# Patient Record
Sex: MALE | Race: ASIAN | NOT HISPANIC OR LATINO | Employment: FULL TIME | URBAN - METROPOLITAN AREA
[De-identification: names, ages, dates, MRNs, and addresses within clinical notes are randomized per-mention and may not be internally consistent; named-entity substitution may affect disease eponyms.]

---

## 2017-02-14 ENCOUNTER — OFFICE VISIT (OUTPATIENT)
Dept: FAMILY MEDICINE | Facility: CLINIC | Age: 22
End: 2017-02-14
Payer: COMMERCIAL

## 2017-02-14 ENCOUNTER — TELEPHONE (OUTPATIENT)
Dept: FAMILY MEDICINE | Facility: CLINIC | Age: 22
End: 2017-02-14

## 2017-02-14 VITALS
DIASTOLIC BLOOD PRESSURE: 82 MMHG | SYSTOLIC BLOOD PRESSURE: 124 MMHG | TEMPERATURE: 97.3 F | WEIGHT: 154 LBS | HEART RATE: 117 BPM | OXYGEN SATURATION: 99 % | BODY MASS INDEX: 24.75 KG/M2 | HEIGHT: 66 IN

## 2017-02-14 DIAGNOSIS — F41.9 ANXIETY: ICD-10-CM

## 2017-02-14 DIAGNOSIS — F90.9 ATTENTION DEFICIT HYPERACTIVITY DISORDER (ADHD), UNSPECIFIED ADHD TYPE: Primary | ICD-10-CM

## 2017-02-14 PROCEDURE — 99203 OFFICE O/P NEW LOW 30 MIN: CPT | Performed by: FAMILY MEDICINE

## 2017-02-14 RX ORDER — METHYLPHENIDATE HYDROCHLORIDE 30 MG/1
30 CAPSULE, EXTENDED RELEASE ORAL EVERY MORNING
Qty: 30 CAPSULE | Refills: 0 | Status: SHIPPED | OUTPATIENT
Start: 2017-02-14 | End: 2017-02-15

## 2017-02-14 ASSESSMENT — ANXIETY QUESTIONNAIRES
2. NOT BEING ABLE TO STOP OR CONTROL WORRYING: SEVERAL DAYS
7. FEELING AFRAID AS IF SOMETHING AWFUL MIGHT HAPPEN: NOT AT ALL
4. TROUBLE RELAXING: SEVERAL DAYS
1. FEELING NERVOUS, ANXIOUS, OR ON EDGE: SEVERAL DAYS
6. BECOMING EASILY ANNOYED OR IRRITABLE: SEVERAL DAYS
IF YOU CHECKED OFF ANY PROBLEMS ON THIS QUESTIONNAIRE, HOW DIFFICULT HAVE THESE PROBLEMS MADE IT FOR YOU TO DO YOUR WORK, TAKE CARE OF THINGS AT HOME, OR GET ALONG WITH OTHER PEOPLE: NOT DIFFICULT AT ALL
3. WORRYING TOO MUCH ABOUT DIFFERENT THINGS: SEVERAL DAYS
GAD7 TOTAL SCORE: 5
5. BEING SO RESTLESS THAT IT IS HARD TO SIT STILL: NOT AT ALL

## 2017-02-14 NOTE — PROGRESS NOTES
SUBJECTIVE:                                                    Denver Brewer is a 21 year old male who presents to clinic today for the following health issues:      Medication Followup of Metadate 20 mg     Taking Medication as prescribed: yes    Side Effects:  Loss of appetite and having a hard time falling asleep.     Medication Helping Symptoms:  Yes    Patient has history of ADHD. Records available of neuropsych testing in Epic. Records reviewed  Patient has previously been getting medication from [facility] [employer] at the [facility]. He graduated recently. 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 Would like to be able to continue his methylphenidate. Currently on 20 mg extended release methylphenidate daily. He has 2 more doses left. He has been skipping days and using it only if absolutely needed. He tells that he benefits a lot when he uses the medication and is able to finish his tasks and stay focused. He tells a medication effect last for 5 hours only. If he takes it at 8 AM, he does not have much medication effect in the afternoon.  Wonders if he could increase the dose of the medication. Reports of no insomnia.  In the past he tells that during exams he would occasionally use 2 tablets to help him be able to study well and then his appetite gets suppressed.      Complains of feeling a little anxious and overwhelmed. Would like to see a therapist for that.    Problem list and histories reviewed & adjusted, as indicated.  Additional history: as documented    Patient Active Problem List   Diagnosis     Attention deficit hyperactivity disorder (ADHD), unspecified ADHD type     History reviewed. No pertinent past surgical history.    Social History   Substance Use Topics     Smoking status: Never Smoker     Smokeless tobacco: Not on file     Alcohol use Yes      Comment: rare     Family History   Problem Relation Age of Onset     Family History Negative Other          Current Outpatient Prescriptions  "  Medication Sig Dispense Refill     Methylphenidate HCl ER, XR, 15 MG CP24 Take 2 capsules by mouth every morning 60 capsule 0     Allergies   Allergen Reactions     Cats        ROS:  C: NEGATIVE for fever, chills, change in weight  I: NEGATIVE for worrisome rashes, moles or lesions  E: NEGATIVE for vision changes or irritation  E/M: NEGATIVE for ear, mouth and throat problems  R: NEGATIVE for significant cough or SOB  B: NEGATIVE for masses, tenderness or discharge  CV: NEGATIVE for chest pain, palpitations or peripheral edema  GI: NEGATIVE for nausea, abdominal pain, heartburn, or change in bowel habits  : NEGATIVE for frequency, dysuria, or hematuria  M: NEGATIVE for significant arthralgias or myalgia  N: NEGATIVE for weakness, dizziness or paresthesias  E: NEGATIVE for temperature intolerance, skin/hair changes  H: NEGATIVE for bleeding problems      OBJECTIVE:                                                    /82 (BP Location: Right arm, Patient Position: Chair, Cuff Size: Adult Regular)  Pulse 117  Temp 97.3  F (36.3  C) (Tympanic)  Ht 5' 5.5\" (1.664 m)  Wt 154 lb (69.9 kg)  SpO2 99%  BMI 25.24 kg/m2  Body mass index is 25.24 kg/(m^2).  GENERAL: healthy, alert and no distress  EYES: Eyes grossly normal to inspection, extraocular movements - intact, and PERRL  HENT: ear canals- normal; TMs- normal; Nose- normal; Mouth- no ulcers, no lesions  NECK: no tenderness, no adenopathy, no asymmetry, no masses, no stiffness; thyroid- normal to palpation  RESP: lungs clear to auscultation - no rales, no rhonchi, no wheezes  CV: regular rates and rhythm, normal S1 S2, no S3 or S4 and no murmur, no click or rub -  ABDOMEN: soft, no tenderness, no  hepatosplenomegaly, no masses, normal bowel sounds  MS: extremities- no gross deformities noted, no edema  SKIN: no suspicious lesions, no rashes  NEURO: strength and tone- normal, sensory exam- grossly normal, mentation- intact, speech- normal, reflexes- " symmetric  BACK: no CVA tenderness, no paralumbar tenderness  PSYCH: Alert and oriented times 3; speech- coherent , normal rate and volume; able to articulate logical thoughts, able to abstract reason, no tangential thoughts, no hallucinations or delusions, affect- normal  LYMPHATICS: ant. cervical- normal, post. cervical- normal, axillary- normal, supraclavicular- normal, inguinal- normal         ASSESSMENT/PLAN:                                                        ICD-10-CM    1. Attention deficit hyperactivity disorder (ADHD), unspecified ADHD type F90.9 DISCONTINUED: methylphenidate (METADATE CD) 30 MG CR capsule   2. Anxiety F41.9 MENTAL HEALTH REFERRAL     Recommending to increase the dose of methylphenidate ER from 20-30 mg daily.  Follow-up in one month for recheck if no improvement noted. Hopefully the higher dose will give a longer coverage of effects from the medication  Instructed to use it only in the morning and avoid using extra tablets without discussing it with me.  Referring to mental health for counseling for mood disorder  Follow up with Provider - as needed     Jacob Ambrose MD  [facility]

## 2017-02-14 NOTE — MR AVS SNAPSHOT
After Visit Summary   2/14/2017    Denver Brewer    MRN: [id number]           Patient Information     Date Of Birth          1995        Visit Information        Provider Department      2/14/2017 5:20 PM Jacob Ambrose MD [facility] [place]        Today's Diagnoses     Attention deficit hyperactivity disorder (ADHD), unspecified ADHD type    -  1    Anxiety           Follow-ups after your visit        Additional Services     MENTAL HEALTH REFERRAL       Your provider has referred you to: [facility]: [facility] - Counseling (Individual/Couples/Family) - [facility] [place] [phone]   [email or web address]/   *Patient will be contacted by [facility] scheduling partner, [facility]), to schedule an appointment.  Patients may also call [facility] to schedule.    All scheduling is subject to the client's specific insurance plan & benefits, provider/location availability, and provider clinical specialities.  Please arrive 15 minutes early for your first appointment and bring your completed paperwork.    Please be aware that coverage of these services is subject to the terms and limitations of your health insurance plan.  Call member services at your health plan with any benefit or coverage questions.                  Who to contact     If you have questions or need follow up information about today's clinic visit or your schedule please contact [facility] [place] directly at [phone].  Normal or non-critical lab and imaging results will be communicated to you by MyChart, letter or phone within 4 business days after the clinic has received the results. If you do not hear from us within 7 days, please contact the clinic through MyChart or phone. If you have a critical or abnormal lab result, we will notify you by phone as soon as possible.  Submit refill requests through The car easily beat or  "call your pharmacy and they will forward the refill request to us. Please allow 3 business days for your refill to be completed.          Additional Information About Your Visit        MyChart Information     [employer] lets you send messages to your doctor, view your test results, renew your prescriptions, schedule appointments and more. To sign up, go to [email or web address] . Click on \"Log in\" on the left side of the screen, which will take you to the Welcome page. Then click on \"Sign up Now\" on the right side of the page.     You will be asked to enter the access code listed below, as well as some personal information. Please follow the directions to create your username and password.     Your access code is: [email or web address]  Expires: 5/15/2017  5:45 PM     Your access code will  in 90 days. If you need help or a new code, please call your [facility] clinic or [phone].        Care EveryWhere ID     This is your Care EveryWhere ID. This could be used by other organizations to access your [facility] medical records  [id number]        Your Vitals Were     Pulse Temperature Height Pulse Oximetry BMI (Body Mass Index)       117 97.3  F (36.3  C) (Tympanic) 5' 5.5\" (1.664 m) 99% 25.24 kg/m2        Blood Pressure from Last 3 Encounters:   17 124/82   12/24/15 112/70    Weight from Last 3 Encounters:   17 154 lb (69.9 kg)   12/24/15 140 lb (63.5 kg)              We Performed the Following     MENTAL HEALTH REFERRAL          Today's Medication Changes          These changes are accurate as of: 17  5:45 PM.  If you have any questions, ask your nurse or doctor.               Start taking these medicines.        Dose/Directions    methylphenidate 30 MG CR capsule   Commonly known as:  METADATE CD   Used for:  Attention deficit hyperactivity disorder (ADHD), unspecified ADHD type   Replaces:  METHYLPHENIDATE HCL PO   Started by:  Jacob Ambrose MD        Dose:  30 mg   Take 1 capsule (30 mg) by mouth " every morning   Quantity:  30 capsule   Refills:  0         Stop taking these medicines if you haven't already. Please contact your care team if you have questions.     METHYLPHENIDATE HCL PO   Replaced by:  methylphenidate 30 MG CR capsule   Stopped by:  Jacob Ambrose MD                Where to get your medicines      Some of these will need a paper prescription and others can be bought over the counter.  Ask your nurse if you have questions.     Bring a paper prescription for each of these medications     methylphenidate 30 MG CR capsule                Primary Care Provider    Physician No Ref-Primary       No address on file        Thank you!     Thank you for choosing [facility]  for your care. Our goal is always to provide you with excellent care. Hearing back from our patients is one way we can continue to improve our services. Please take a few minutes to complete the written survey that you may receive in the mail after your visit with us. Thank you!             Your Updated Medication List - Protect others around you: Learn how to safely use, store and throw away your medicines at www.disposemymeds.org.          This list is accurate as of: 2/14/17  5:45 PM.  Always use your most recent med list.                   Brand Name Dispense Instructions for use    methylphenidate 30 MG CR capsule    METADATE CD    30 capsule    Take 1 capsule (30 mg) by mouth every morning

## 2017-02-14 NOTE — NURSING NOTE
"Chief Complaint   Patient presents with     Recheck Medication     refills ADD medications      Establish Care       Initial /82 (BP Location: Right arm, Patient Position: Chair, Cuff Size: Adult Regular)  Pulse 117  Temp 97.3  F (36.3  C) (Tympanic)  Ht 5' 5.5\" (1.664 m)  Wt 154 lb (69.9 kg)  SpO2 99%  BMI 25.24 kg/m2 Estimated body mass index is 25.24 kg/(m^2) as calculated from the following:    Height as of this encounter: 5' 5.5\" (1.664 m).    Weight as of this encounter: 154 lb (69.9 kg).  BP completed using cuff size: regular    Health Maintenance Due   Topic Date Due     HPV IMMUNIZATION (1 of 3 - Male 3 Dose Series) 07/25/2005     TETANUS IMMUNIZATION (SYSTEM ASSIGNED)  07/25/2012     INFLUENZA VACCINE (SYSTEM ASSIGNED)  09/01/2016         Cristina Golden MA 2/14/17        "

## 2017-02-15 DIAGNOSIS — F90.9 ATTENTION DEFICIT HYPERACTIVITY DISORDER (ADHD), UNSPECIFIED ADHD TYPE: ICD-10-CM

## 2017-02-15 RX ORDER — METHYLPHENIDATE HYDROCHLORIDE 15 MG/1
2 CAPSULE, EXTENDED RELEASE ORAL EVERY MORNING
Qty: 60 CAPSULE | Refills: 0 | Status: SHIPPED | OUTPATIENT
Start: 2017-02-15 | End: 2017-02-16

## 2017-02-15 ASSESSMENT — ANXIETY QUESTIONNAIRES: GAD7 TOTAL SCORE: 5

## 2017-02-15 NOTE — TELEPHONE ENCOUNTER
I will order 15 mg Metadate instead and have the patient take 2 caps QD.   Lets hope that is covered.   Notify patient and the pharmacy.     Thanks.    Jacob Ambrose MD  [facility], [place]

## 2017-02-15 NOTE — TELEPHONE ENCOUNTER
Spoke with patient and informed of below. Patient verbalized understanding and agrees with plan.   Heather Neil RN   [facility] - Triage

## 2017-02-15 NOTE — TELEPHONE ENCOUNTER
Medication is not covered by insurance 30 mg Metadate   Pharmacy  Told the patient that the  20 mg XR is covered-  Patient wants this changed tonight-  Advised the patient that we are closed and he wll have to wait until tomorrow.  patient agreeable  Mimi MadisonRN  [facility]  [phone]

## 2017-02-15 NOTE — TELEPHONE ENCOUNTER
Patient called and said he was just at the clinic pharmacy to pickup up his Methylphenidate HCl ER, XR, 15 MG CP24  States he needs it changed to Methylphenidate 20 mg in tablet form  Please call patient at [phone] for questions  Ronda JAEGER

## 2017-02-16 RX ORDER — METHYLPHENIDATE HYDROCHLORIDE 20 MG/1
1 CAPSULE, EXTENDED RELEASE ORAL EVERY MORNING
Qty: 30 CAPSULE | Refills: 0 | Status: SHIPPED | OUTPATIENT
Start: 2017-02-16 | End: 2017-02-16

## 2017-02-16 RX ORDER — METHYLPHENIDATE HYDROCHLORIDE 20 MG/1
1 CAPSULE, EXTENDED RELEASE ORAL EVERY MORNING
Qty: 30 CAPSULE | Refills: 0 | Status: SHIPPED | OUTPATIENT
Start: 2017-02-16 | End: 2017-03-24

## 2017-02-16 NOTE — TELEPHONE ENCOUNTER
Please see the notes below, pt requesting the tablet not capsule due to insurance reasons. Rx for the Methylphenidate XR capsule placed on your desk.  Nina Rodriguez,

## 2017-02-16 NOTE — TELEPHONE ENCOUNTER
Patient states that both [facility] and [facility] do not carry the 15 mg either capsule or tablet.    Patient request to go back the methylphenidate ER 20 mg tablets.    Requests to  rx at the [facility]. Please walk rx to [facility].    Please call patient when prescription is ready for .  Sherie Cancino RN

## 2017-02-16 NOTE — TELEPHONE ENCOUNTER
TC took script to clinic pharmacy per message below  Pharmacist said that script was incorrect and that's not what the patient wanted  Also stated that our clinic pharmacy doesn't carry the Methylphenidate HCI ER, XR, 20 MG CP24  Pharmacist also said patient said he wanted the 12hr sustained release and that patient doesn't use our pharmacy and was going to [facility]  TC placed script back in Provider bin to discuss with pharmacist  Patient has not yet been notified  Ronda JAEGER

## 2017-02-17 RX ORDER — METHYLPHENIDATE HYDROCHLORIDE EXTENDED RELEASE 20 MG/1
20 TABLET ORAL EVERY MORNING
Qty: 30 TABLET | Refills: 0 | Status: SHIPPED | OUTPATIENT
Start: 2017-02-17 | End: 2017-03-24

## 2017-02-17 NOTE — TELEPHONE ENCOUNTER
"Patient states that he cannot afford the 30 mg tablets at this time and needs to stick with the 20 mg ER 24 hour tablet.     Last rx written was for capsules, needs tablets    Patient would like phone call when prescription is placed at  for , [phone], answers to \"Frank\" (hoping to be ready for 1 PM pickup)    Pended rx, please edit    RICO Forrester    "

## 2017-02-17 NOTE — TELEPHONE ENCOUNTER
Per Dr. Ambrose note, the patient should increase his dose to methylphenidate 30 mg ER daily from 20 mg daily.  I can rewrite this script, if patient does not want this new dosing, we should wait for Dr. Ambrose to return to the office to address it and determine appropriate dosing

## 2017-02-17 NOTE — TELEPHONE ENCOUNTER
Rx delivered to  [place] pharmacy . Previous rx for capsules destroyed and witnessed by Mimi Madison RN.  Nina Rodriguez,

## 2017-03-14 PROBLEM — F41.9 ANXIETY: Status: ACTIVE | Noted: 2017-03-14

## 2017-03-23 DIAGNOSIS — F90.9 ATTENTION DEFICIT HYPERACTIVITY DISORDER (ADHD), UNSPECIFIED ADHD TYPE: ICD-10-CM

## 2017-03-23 RX ORDER — METHYLPHENIDATE HYDROCHLORIDE EXTENDED RELEASE 20 MG/1
20 TABLET ORAL EVERY MORNING
Qty: 30 TABLET | Refills: 0 | Status: CANCELLED | OUTPATIENT
Start: 2017-03-23

## 2017-03-23 NOTE — TELEPHONE ENCOUNTER
metadate      Last Written Prescription Date:  2/17/17  Last Fill Quantity: 30,   # refills: 0  Last Office Visit with [facility], [facility] or [facility] prescribing provider: 2/14/17  Future Office visit:       Routing refill request to provider for review/approval because:  Drug not on the [facility], [facility] or [facility] refill protocol or controlled substance    RX [place] reviewed:  reviewed- no concerns    [place]:  [email or web address]    Heather Neil RN   [facility] - Triage

## 2017-03-23 NOTE — TELEPHONE ENCOUNTER
Reason for Call:  Medication or medication refill:    Do you use a [facility]?  Name of the pharmacy and phone number for the current request:  Will  from the clinic    Name of the medication requested: methylphenidate (METADATE ER) 20 MG CR     Other request: pt stated he is out of medication and need tomorrow by 2 PM if possible.    Can we leave a detailed message on this number? YES    Phone number patient can be reached at: Home number on file [phone] (home)    Best Time: anytime    Call taken on 3/23/2017 at 6:28 PM by Julienne Verdugo

## 2017-03-24 ENCOUNTER — TELEPHONE (OUTPATIENT)
Dept: FAMILY MEDICINE | Facility: CLINIC | Age: 22
End: 2017-03-24

## 2017-03-24 RX ORDER — METHYLPHENIDATE HYDROCHLORIDE EXTENDED RELEASE 20 MG/1
20 TABLET ORAL EVERY MORNING
Qty: 30 TABLET | Refills: 0 | Status: SHIPPED | OUTPATIENT
Start: 2017-03-24 | End: 2017-05-17

## 2017-03-24 NOTE — TELEPHONE ENCOUNTER
Reason for call: Medication   If this is a refill request, has the caller requested the refill from the pharmacy already? N/A  Will the patient be using a [facility]? No  Name of the pharmacy and phone number for the current [facility] [place]    Name of the medication requested: Methylphenidate, 20 mg    Phone Number Pt can be reached at: Home number on file [phone] (home)  Best Time: anytime  Can we leave a detailed message on this number? YES

## 2017-05-17 DIAGNOSIS — F90.9 ATTENTION DEFICIT HYPERACTIVITY DISORDER (ADHD), UNSPECIFIED ADHD TYPE: ICD-10-CM

## 2017-05-17 NOTE — TELEPHONE ENCOUNTER
Patient will  hard copy when ready    Controlled Substance Refill Request for methylphenidate (METADATE ER) 20 MG CR tablet  Problem List Complete:  No     PROVIDER TO CONSIDER COMPLETION OF PROBLEM LIST AND OVERVIEW/CONTROLLED SUBSTANCE AGREEMENT    Last Written Prescription Date:  3/24/17  Last Fill Quantity: 30,   # refills: 0    Last Office Visit with [facility] primary care provider: 2/14/17    Future Office visit:     Controlled substance agreement on file: No.     Processing:  Patient will  in clinic   checked in past 6 months?  Yes 3/23/17     Ronda JAEGER

## 2017-05-18 RX ORDER — METHYLPHENIDATE HYDROCHLORIDE EXTENDED RELEASE 20 MG/1
20 TABLET ORAL EVERY MORNING
Qty: 30 TABLET | Refills: 0 | Status: SHIPPED | OUTPATIENT
Start: 2017-05-18 | End: 2017-07-07

## 2017-07-06 DIAGNOSIS — F90.9 ATTENTION DEFICIT HYPERACTIVITY DISORDER (ADHD), UNSPECIFIED ADHD TYPE: ICD-10-CM

## 2017-07-06 NOTE — TELEPHONE ENCOUNTER
Reason for Call:  Medication or medication refill:    Do you use a [facility]?  Name of the pharmacy and phone number for the current request:  Will  from the clinic.    Name of the medication requested: METADATE ER) 20 MG CR    Other request: no    Can we leave a detailed message on this number? YES    Phone number patient can be reached at: Home number on file [phone] (home)    Best Time: anytime    Call taken on 7/6/2017 at 5:13 PM by Julienne Verdugo

## 2017-07-07 ENCOUNTER — NURSE TRIAGE (OUTPATIENT)
Dept: NURSING | Facility: CLINIC | Age: 22
End: 2017-07-07

## 2017-07-07 NOTE — TELEPHONE ENCOUNTER
Controlled Substance Refill Request for methylphenidate (metadate er) 20 mg cr tablet  Problem List Complete:  Yes   checked in past 6 months?  No, route to RN

## 2017-07-07 NOTE — TELEPHONE ENCOUNTER
Regarding: calling to check on refill request  ----- Message from Cristina Valdes sent at 7/7/2017  6:35 PM CDT -----  Reason for Call:  Medication or medication refill:    Do you use a [facility]?  Name of the pharmacy and phone number for the current request:      Name of the medication requested: Metadate    Other request:     Can we leave a detailed message on this number? YES    Phone number patient can be reached at: Home number on file [phone] (home)    Best Time:     Call taken on 7/7/2017 at 6:34 PM by Cristina Valdes

## 2017-07-07 NOTE — TELEPHONE ENCOUNTER
Refill request in place for medication.  Patient will call the clinic on Monday to check on the status.     Danielle Romo RN/NAT

## 2017-07-10 RX ORDER — METHYLPHENIDATE HYDROCHLORIDE EXTENDED RELEASE 20 MG/1
20 TABLET ORAL EVERY MORNING
Qty: 30 TABLET | Refills: 0 | Status: SHIPPED | OUTPATIENT
Start: 2017-07-10 | End: 2017-09-06

## 2017-07-13 DIAGNOSIS — F90.9 ATTENTION DEFICIT HYPERACTIVITY DISORDER (ADHD), UNSPECIFIED ADHD TYPE: ICD-10-CM

## 2017-07-13 RX ORDER — METHYLPHENIDATE HYDROCHLORIDE EXTENDED RELEASE 20 MG/1
20 TABLET ORAL EVERY MORNING
Qty: 30 TABLET | Refills: 0 | Status: CANCELLED | OUTPATIENT
Start: 2017-07-13

## 2017-07-13 NOTE — TELEPHONE ENCOUNTER
Reason for Call:  Other prescription from tablets to capsules    Detailed comments: Pt called this afternoon and would like his metadate tablets to be capsules isntead due to the cost of them at the pharmacy. If this could please be changed and then contact the pt once the refill or change has been made. Thank you.    Phone Number Patient can be reached at: Home number on file [phone] (home)    Best Time:     Can we leave a detailed message on this number? YES    Call taken on 7/13/2017 at 4:21 PM by Nubia Clinton

## 2017-07-14 RX ORDER — METHYLPHENIDATE HYDROCHLORIDE 20 MG/1
20 CAPSULE, EXTENDED RELEASE ORAL EVERY MORNING
Qty: 30 CAPSULE | Refills: 0 | Status: SHIPPED | OUTPATIENT
Start: 2017-07-14 | End: 2017-09-06

## 2017-07-14 NOTE — TELEPHONE ENCOUNTER
Metadate 20 mg tablets last written 7/10/17 #30 tablets     Spoke with patient and [facility] pharmacist, Colin, who states that pharmacy will need new scripts for methylphenidate (metadate CD) 20 mg CR capsules for insurance coverage.   Order pended.   Please review and sign as appropriate.      Patient willing to bring back the prescription for tablets to trade for new rx for capsules.     Please call patient when ready to  in clinic.  Thank you    Dang Najera RN

## 2017-07-14 NOTE — TELEPHONE ENCOUNTER
Due to control substance. Need to know if pharmacy can take verbal or needing to print new scripts.  Unsure which pharmacy to verify if verbal can be given.    Unable to reach patient or leave message as no VM option available   Will attempt later.     Dang Najera RN

## 2017-09-05 ENCOUNTER — TELEPHONE (OUTPATIENT)
Dept: FAMILY MEDICINE | Facility: CLINIC | Age: 22
End: 2017-09-05

## 2017-09-05 DIAGNOSIS — F90.9 ATTENTION DEFICIT HYPERACTIVITY DISORDER (ADHD), UNSPECIFIED ADHD TYPE: ICD-10-CM

## 2017-09-05 NOTE — TELEPHONE ENCOUNTER
Reason for Call:  Medication or medication refill:    Do you use a [facility]?  Name of the pharmacy and phone number for the current request:  Will  Rx from the clinic.    Name of the medication requested: methylphenidate (METADATE ER) 20 MG CR tablet    Other request: no     Can we leave a detailed message on this number? YES    Phone number patient can be reached at: Home number on file [phone] (home)    Best Time: anytime    Call taken on 9/5/2017 at 2:31 PM by Julienne Verdugo

## 2017-09-06 RX ORDER — METHYLPHENIDATE HYDROCHLORIDE 20 MG/1
20 CAPSULE, EXTENDED RELEASE ORAL EVERY MORNING
Qty: 30 CAPSULE | Refills: 0 | Status: SHIPPED | OUTPATIENT
Start: 2017-09-06 | End: 2018-01-08

## 2017-09-06 RX ORDER — METHYLPHENIDATE HYDROCHLORIDE EXTENDED RELEASE 20 MG/1
20 TABLET ORAL EVERY MORNING
Qty: 30 TABLET | Refills: 0 | Status: SHIPPED | OUTPATIENT
Start: 2017-09-06 | End: 2017-09-06

## 2017-09-06 NOTE — TELEPHONE ENCOUNTER
Will  when ready [phone]   TC called to verify with patient which one- he said ER      Controlled Substance Refill Request for methylphenidate (METADATE ER) 20 MG CR tablet  Problem List Complete:  No     PROVIDER TO CONSIDER COMPLETION OF PROBLEM LIST AND OVERVIEW/CONTROLLED SUBSTANCE AGREEMENT    Last Written Prescription Date:  7/10/17  Last Fill Quantity: 30,   # refills: 0    Last Office Visit with [facility] primary care provider: 2/14/17    Future Office visit:     Controlled substance agreement on file: No.     Processing:  Patient will  in clinic     checked in past 6 months?  No, route to RICO JAEGER

## 2017-09-06 NOTE — TELEPHONE ENCOUNTER
Patient states he needs Capsules and that they are cheaper than the tablets  Patient waiting in [facility] gave script back to PCP  Ronda JAEGER

## 2018-01-08 DIAGNOSIS — F90.9 ATTENTION DEFICIT HYPERACTIVITY DISORDER (ADHD), UNSPECIFIED ADHD TYPE: ICD-10-CM

## 2018-01-08 NOTE — TELEPHONE ENCOUNTER
Reason for Call:  Medication or medication refill:    Do you use a [facility]?  Name of the pharmacy and phone number for the current request:  [facility] [place] - [phone]    Name of the medication requested: methylphenidate (METADATE CD) 20 MG CR capsule    Other request: n/a   Can we leave a detailed message on this number? YES    Phone number patient can be reached at: Cell number on file:    No relevant phone numbers on file. [phone]       Best Time: Anytime     Call taken on 1/8/2018 at 1:16 PM by Eloise Pierce

## 2018-01-08 NOTE — TELEPHONE ENCOUNTER
metadate      Last Written Prescription Date:  9/6/17  Last Fill Quantity: 30,   # refills: 0  Last Office Visit: 2/14/17  Future Office visit:       Routing refill request to provider for review/approval because:  Drug not on the [facility], [facility] or [facility] refill protocol or controlled substance    Heather Neil RN   [facility] - Triage

## 2018-01-09 RX ORDER — METHYLPHENIDATE HYDROCHLORIDE 20 MG/1
20 CAPSULE, EXTENDED RELEASE ORAL EVERY MORNING
Qty: 30 CAPSULE | Refills: 0 | Status: SHIPPED | OUTPATIENT
Start: 2018-01-09 | End: 2018-03-01

## 2018-01-09 NOTE — TELEPHONE ENCOUNTER
Routing to team to inform and assist in scheduling.   Heather Neil RN   [facility] - Triage

## 2018-01-09 NOTE — TELEPHONE ENCOUNTER
Patient needs to be seen before the next refill for a repeat check on ADHD.  RF for 30 days ordered.     Jacob Ambrose MD  [facility], [place]

## 2018-03-01 ENCOUNTER — OFFICE VISIT (OUTPATIENT)
Dept: FAMILY MEDICINE | Facility: CLINIC | Age: 23
End: 2018-03-01
Payer: COMMERCIAL

## 2018-03-01 VITALS
SYSTOLIC BLOOD PRESSURE: 100 MMHG | HEIGHT: 66 IN | HEART RATE: 71 BPM | WEIGHT: 156.8 LBS | OXYGEN SATURATION: 98 % | BODY MASS INDEX: 25.2 KG/M2 | DIASTOLIC BLOOD PRESSURE: 68 MMHG | TEMPERATURE: 96.6 F

## 2018-03-01 DIAGNOSIS — Z23 NEED FOR PROPHYLACTIC VACCINATION WITH TETANUS-DIPHTHERIA (TD): ICD-10-CM

## 2018-03-01 DIAGNOSIS — F90.9 ATTENTION DEFICIT HYPERACTIVITY DISORDER (ADHD), UNSPECIFIED ADHD TYPE: Primary | ICD-10-CM

## 2018-03-01 DIAGNOSIS — Z23 NEED FOR VACCINATION: ICD-10-CM

## 2018-03-01 PROCEDURE — 90714 TD VACC NO PRESV 7 YRS+ IM: CPT | Performed by: FAMILY MEDICINE

## 2018-03-01 PROCEDURE — 99213 OFFICE O/P EST LOW 20 MIN: CPT | Mod: 25 | Performed by: FAMILY MEDICINE

## 2018-03-01 PROCEDURE — 90471 IMMUNIZATION ADMIN: CPT | Performed by: FAMILY MEDICINE

## 2018-03-01 RX ORDER — METHYLPHENIDATE HYDROCHLORIDE 20 MG/1
20 CAPSULE, EXTENDED RELEASE ORAL EVERY MORNING
Qty: 30 CAPSULE | Refills: 0 | Status: SHIPPED | OUTPATIENT
Start: 2018-03-01 | End: 2018-04-09

## 2018-03-01 NOTE — MR AVS SNAPSHOT
"              After Visit Summary   3/1/2018    Denver Brewer    MRN: [id number]           Patient Information     Date Of Birth          1995        Visit Information        Provider Department      3/1/2018 10:20 AM Jacob Ambrose MD [facility]        Today's Diagnoses     Attention deficit hyperactivity disorder (ADHD), unspecified ADHD type           Follow-ups after your visit        Follow-up notes from your care team     Return in about 1 year (around 3/1/2019).      Who to contact     If you have questions or need follow up information about today's clinic visit or your schedule please contact [facility] directly at [phone].  Normal or non-critical lab and imaging results will be communicated to you by MyChart, letter or phone within 4 business days after the clinic has received the results. If you do not hear from us within 7 days, please contact the clinic through MyChart or phone. If you have a critical or abnormal lab result, we will notify you by phone as soon as possible.  Submit refill requests through [employer] or call your pharmacy and they will forward the refill request to us. Please allow 3 business days for your refill to be completed.          Additional Information About Your Visit        MyChart Information     [employer] lets you send messages to your doctor, view your test results, renew your prescriptions, schedule appointments and more. To sign up, go to [email or web address] . Click on \"Log in\" on the left side of the screen, which will take you to the Welcome page. Then click on \"Sign up Now\" on the right side of the page.     You will be asked to enter the access code listed below, as well as some personal information. Please follow the directions to create your username and password.     Your access code is: [email or web address]  Expires: 2018 10:49 AM     Your access code will  in 90 days. If you need help or a new code, please call " "your [place] clinic or [phone].        Care EveryWhere ID     This is your Care EveryWhere ID. This could be used by other organizations to access your [place] medical records  [id number]        Your Vitals Were     Pulse Temperature Height Pulse Oximetry BMI (Body Mass Index)       71 96.6  F (35.9  C) (Tympanic) 5' 6\" (1.676 m) 98% 25.31 kg/m2        Blood Pressure from Last 3 Encounters:   03/01/18 100/68   02/14/17 124/82   12/24/15 112/70    Weight from Last 3 Encounters:   03/01/18 156 lb 12.8 oz (71.1 kg)   02/14/17 154 lb (69.9 kg)   12/24/15 140 lb (63.5 kg)              Today, you had the following     No orders found for display         Where to get your medicines      Some of these will need a paper prescription and others can be bought over the counter.  Ask your nurse if you have questions.     Bring a paper prescription for each of these medications     methylphenidate 20 MG CR capsule          Primary Care Provider Office Phone # Fax #    Jacob Ambrose [phone]       [address]  [place] [address]        Equal Access to Services     ALEXANDER DAVIS AH: Hadii paulo ku hadasho Soomaali, waaxda luqadaha, qaybta kaalmada adeegyada, cassie figueroa. So [facility] [phone].    ATENCIÓN: Si habla español, tiene a romero disposición servicios gratuitos de asistencia lingüística. Llame al [phone].    We comply with applicable federal civil rights laws and [place] laws. We do not discriminate on the basis of race, color, national origin, age, disability, sex, sexual orientation, or gender identity.            Thank you!     Thank you for choosing [facility] [place]  for your care. Our goal is always to provide you with excellent care. Hearing back from our patients is one way we can continue to improve our services. Please take a few minutes to complete the written survey that you may receive in the mail after your visit with us. Thank " you!             Your Updated Medication List - Protect others around you: Learn how to safely use, store and throw away your medicines at www.disposemymeds.org.          This list is accurate as of 3/1/18 10:49 AM.  Always use your most recent med list.                   Brand Name Dispense Instructions for use Diagnosis    methylphenidate 20 MG CR capsule    METADATE CD    30 capsule    Take 1 capsule (20 mg) by mouth every morning    Attention deficit hyperactivity disorder (ADHD), unspecified ADHD type

## 2018-03-01 NOTE — NURSING NOTE
"Chief Complaint   Patient presents with     Recheck Medication       Initial /68 (BP Location: Left arm, Patient Position: Sitting, Cuff Size: Adult Regular)  Pulse 71  Temp 96.6  F (35.9  C) (Tympanic)  Ht 5' 6\" (1.676 m)  Wt 156 lb 12.8 oz (71.1 kg)  SpO2 98%  BMI 25.31 kg/m2 Estimated body mass index is 25.31 kg/(m^2) as calculated from the following:    Height as of this encounter: 5' 6\" (1.676 m).    Weight as of this encounter: 156 lb 12.8 oz (71.1 kg).  Medication Reconciliation: complete   Bernadine Smith MA     "

## 2018-03-01 NOTE — PROGRESS NOTES
"  SUBJECTIVE:   Denver Brewer is a 22 year old male who presents to clinic today for the following health issues:      Medication Followup of  Metadate 20mg     Taking Medication as prescribed: yes    Side Effects: suppressed appetite     Medication Helping Symptoms:  yes           Problem list and histories reviewed & adjusted, as indicated.  Additional history: as documented    Patient Active Problem List   Diagnosis     Attention deficit hyperactivity disorder (ADHD), unspecified ADHD type     Anxiety     No past surgical history on file.    Social History   Substance Use Topics     Smoking status: Never Smoker     Smokeless tobacco: Not on file     Alcohol use Yes      Comment: rare     Family History   Problem Relation Age of Onset     Family History Negative Other          Current Outpatient Prescriptions   Medication Sig Dispense Refill     methylphenidate (METADATE CD) 20 MG CR capsule Take 1 capsule (20 mg) by mouth every morning 30 capsule 0     Allergies   Allergen Reactions     Cats        Reviewed and updated as needed this visit by clinical staff  Allergies  Meds  Problems       Reviewed and updated as needed this visit by Provider  Allergies  Meds  Problems         ROS:  CONSTITUTIONAL: NEGATIVE for fever, chills, change in weight  ENT/MOUTH: NEGATIVE for ear, mouth and throat problems  RESP: NEGATIVE for significant cough or SOB  CV: NEGATIVE for chest pain, palpitations or peripheral edema    OBJECTIVE:                                                    /68 (BP Location: Left arm, Patient Position: Sitting, Cuff Size: Adult Regular)  Pulse 71  Temp 96.6  F (35.9  C) (Tympanic)  Ht 5' 6\" (1.676 m)  Wt 156 lb 12.8 oz (71.1 kg)  SpO2 98%  BMI 25.31 kg/m2  Body mass index is 25.31 kg/(m^2).   GENERAL: healthy, alert, well nourished, well hydrated, no distress  HENT: ear canals- normal; TMs- normal; Nose- normal; Mouth- no ulcers, no lesions  NECK: no tenderness, no adenopathy, no " asymmetry, no masses, no stiffness; thyroid- normal to palpation  RESP: lungs clear to auscultation - no rales, no rhonchi, no wheezes  CV: regular rates and rhythm, normal S1 S2, no S3 or S4 and no murmur, no click or rub -  ABDOMEN: soft, no tenderness, no  hepatosplenomegaly, no masses, normal bowel sounds         ASSESSMENT/PLAN:                                                      1. Attention deficit hyperactivity disorder (ADHD), unspecified ADHD type  Symptoms are well managed.  Refill ordered  - methylphenidate (METADATE CD) 20 MG CR capsule; Take 1 capsule (20 mg) by mouth every morning  Dispense: 30 capsule; Refill: 0      2. Need for prophylactic vaccination with tetanus-diphtheria (TD)    - TD PRSERV FREE >=7 YRS ADS IM [[id number]]          Jacob Ambrose MD  [facility] [place]

## 2018-04-09 DIAGNOSIS — F90.9 ATTENTION DEFICIT HYPERACTIVITY DISORDER (ADHD), UNSPECIFIED ADHD TYPE: ICD-10-CM

## 2018-04-09 NOTE — TELEPHONE ENCOUNTER
Reason for Call:  Medication or medication refill:    Do you use a [facility]?  Name of the pharmacy and phone number for the current request:  Will  from the clinic    Name of the medication requested: Attention deficit hyperactivity disorder (ADHD), unspecified ADHD type    Other request: no    Can we leave a detailed message on this number? YES    Phone number patient can be reached at: Home number on file [phone] (home)    Best Time: anytime    Call taken on 4/9/2018 at 5:20 PM by Julienne Verdugo

## 2018-04-09 NOTE — TELEPHONE ENCOUNTER
Methylphenidate 20 mg CR capsule      Last Written Prescription Date:  3/1/18  Last Fill Quantity: 30,   # refills: 0  Last Office Visit: 3/1/18  Future Office visit:       Routing refill request to provider for review/approval because:  Drug not on the [facility], [facility] or [facility] refill protocol or controlled substance  Sherie Cancino RN

## 2018-04-10 RX ORDER — METHYLPHENIDATE HYDROCHLORIDE 20 MG/1
20 CAPSULE, EXTENDED RELEASE ORAL EVERY MORNING
Qty: 30 CAPSULE | Refills: 0 | Status: SHIPPED | OUTPATIENT
Start: 2018-04-10 | End: 2018-05-07

## 2018-04-12 NOTE — TELEPHONE ENCOUNTER
Rx picked up by unknown (name not printed on slip) on date not written on slip.  Nina Rodriguez,

## 2018-04-26 ENCOUNTER — OFFICE VISIT (OUTPATIENT)
Dept: FAMILY MEDICINE | Facility: CLINIC | Age: 23
End: 2018-04-26
Payer: COMMERCIAL

## 2018-04-26 VITALS — DIASTOLIC BLOOD PRESSURE: 80 MMHG | HEART RATE: 83 BPM | OXYGEN SATURATION: 100 % | SYSTOLIC BLOOD PRESSURE: 126 MMHG

## 2018-04-26 DIAGNOSIS — L29.9 PRURITIC DISORDER: ICD-10-CM

## 2018-04-26 DIAGNOSIS — L30.9 ECZEMA, UNSPECIFIED TYPE: Primary | ICD-10-CM

## 2018-04-26 DIAGNOSIS — L85.8 KERATOSIS PILARIS: ICD-10-CM

## 2018-04-26 PROCEDURE — 99213 OFFICE O/P EST LOW 20 MIN: CPT | Performed by: FAMILY MEDICINE

## 2018-04-26 RX ORDER — TRIAMCINOLONE ACETONIDE 1 MG/G
CREAM TOPICAL
Qty: 80 G | Refills: 0 | Status: SHIPPED | OUTPATIENT
Start: 2018-04-26 | End: 2018-08-08

## 2018-04-26 NOTE — PATIENT INSTRUCTIONS
"FUTURE APPOINTMENTS  Follow up as needed in 2 weeks if itchiness is not improving.    Avoid scratching, as scratching will continue the itch-scratch cycle. The following treatments will help to minimize itch sensations.    DRY SKIN MANAGEMENT INSTRUCTIONS  Routine use of moisturizer is important for healthy, resilient skin not just for soft skin. Continue applying moisturizer every day even after symptoms resolve.    Sealing in moisture    Twice daily use of a moisturizer such as over-the-counter (OTC) CeraVe moisturizer cream (in the jar). CeraVe products contain ceramides and filaggrin proteins that can help to maintain the body's moisture layer.    After cleansing or washing, always apply moisturizer immediately after drying off (pat dry only) for best effect.    Protection while hydrating    Do not overuse soap. Unless you have been sweating extensively, just apply soap to groin and armpits.    Recommended products for body include: OTC unscented Dove for sensitive skin or OTC Vanicream cleansing bar.    Recommended facial cleansers include: OTC CeraVe hydrating facial cleanser or OTC Cetaphil daily facial cleanser.    Do not use Old Spice body wash at this time.    Avoid use of    Scented/perfumed products    Irritating clothing (wool, new jeans, new/unwashed clothing, scratchy synthetics)    Neosporin or triple antibiotic topical products    Products containing aloe, herbs, Vitamin E, or other \"natural ingredients\".    Dryer sheets or fabric softeners (while symptoms are present)    If a topical medication is prescribed, apply topical prescription first, followed by use of moisturizing product.    TOPICAL STEROID INSTRUCTIONS  Triamcinolone 0.1% cream.    Apply an amount equal to half of a fingertip (0.25 g) to the affected area(s) on the back, arms and legs, two times per day for 10-14 days.    \"Fingertip Amount\"      Not to be used on face or groin.    After initial treatment, topical steroid may be used as " needed for flare-ups.    Topical steroid use is for short-term treatment only. If after initial treatment, you are using this for prolonged periods of time, return to clinic for re-evaluation of treatment.    Keep in mind to also regularly use moisturizer, as this preventative measure can help maintain your skin's natural moisture barrier.    ORAL ANTIHISTAMINE  Take by mouth, 1 tablet(s) of OTC cetirizine (Zyrtec) 10 mg once per day for 2 weeks.

## 2018-04-26 NOTE — PROGRESS NOTES
[facility] - PRIMARY CARE SKIN    CC : Rash   SUBJECTIVE:   Denver Brewer is a 22 year old male who presents to clinic today because of a(n) rash beginning approximately 1.5 months ago on the forearms, upper legs and back. Itchy bumps persist for days-weeks after onset. He denies any involvement of lower legs, face, chest and abdomen at any time.    Keratosis pilaris bumpy texture of the upper arms has been a chronic issue.    Pruritic : YES.  Symptoms appear to be : worsening.  Aggravating factors : scratching.  Relieving factors : none identified.    Previous similar history : NO.  Recent exposure history : none known. He denies any cold or flu prior to onset of itchiness. He has gone swimming at a gym prior to beginning of this episode. He does not shower within 2 hours of swimming.  Any other family members with similar symptoms : He lives alone.    Therapies tried : None.  Products used : No moisturizers are used. He uses Old Spice body wash and has done so long-term.    Personal Medical History  Skin Cancer : NO  Eczema Psoriasis Rosacea Autoimmune   NO NO NO NO     Family Medical History  Skin Cancer : NO  Eczema Psoriasis Rosacea Autoimmune   NO NO NO NO     Occupation : works at a store (indoor).    Patient Active Problem List   Diagnosis     Attention deficit hyperactivity disorder (ADHD), unspecified ADHD type     Anxiety       Past Medical History:   Diagnosis Date     ADHD (attention deficit hyperactivity disorder)     No past surgical history on file.   Social History   Substance Use Topics     Smoking status: Never Smoker     Smokeless tobacco: Never Used     Alcohol use Yes      Comment: rare    Family History     Problem (# of Occurrences) Relation (Name,Age of Onset)    Family History Negative (1) Other           Prescription Medications as of 4/26/2018             methylphenidate (METADATE CD) 20 MG CR capsule Take 1 capsule (20 mg) by mouth every morning    triamcinolone (KENALOG) 0.1 %  cream Apply to AA on arms, legs and trunk bid for 2 weeks            Allergies   Allergen Reactions     Cats         INTEGUMENTARY/SKIN: POSITIVE for pruritis and rash    ROS : 14 point review of systems was negative except the symptoms listed above in the HPI.    This document serves as a record of the services and decisions personally performed and made by Ludmila Posey MD. It was created on her behalf by Odell Valdivia, a trained medical scribe.  The creation of this document is based on the scribe's personal observations and the provider's statements to the medical scribe.  Odell Valdivia, April 26, 2018 9:08 AM      OBJECTIVE:   GENERAL: healthy, alert and in no distress  SKIN: Car Skin Type - II.  Face, Neck, Trunk, Arms, Legs and Hands were examined. The dermatoscope was used to help evaluate pigmented lesions.  Skin Pertinent Findings:  Patient visibly scratches throughout the office visit.    Scattered multiple erythematous maculopapular eruption with no scaling on left forearm, right thigh but not in linear distribution.    Back : Erythematous micah patches and papules on the lower back    Some dermatographism on right forearm.    Keratosis pilaris on upper arms.    Abdomen and chest : Clear.    Diagnostic Test Results:  none           ASSESSMENT:     Encounter Diagnoses   Name Primary?     Eczema, unspecified type Yes     Pruritic disorder      Keratosis pilaris          PLAN:   Patient Instructions   FUTURE APPOINTMENTS  Follow up as needed in 2 weeks if itchiness is not improving.    Avoid scratching, as scratching will continue the itch-scratch cycle. The following treatments will help to minimize itch sensations.    DRY SKIN MANAGEMENT INSTRUCTIONS  Routine use of moisturizer is important for healthy, resilient skin not just for soft skin. Continue applying moisturizer every day even after symptoms resolve.    Sealing in moisture    Twice daily use of a moisturizer such as over-the-counter (OTC)  "CeraVe moisturizer cream (in the jar). CeraVe products contain ceramides and filaggrin proteins that can help to maintain the body's moisture layer.    After cleansing or washing, always apply moisturizer immediately after drying off (pat dry only) for best effect.    Protection while hydrating    Do not overuse soap. Unless you have been sweating extensively, just apply soap to groin and armpits.    Recommended products for body include: OTC unscented Dove for sensitive skin or OTC Vanicream cleansing bar.    Recommended facial cleansers include: OTC CeraVe hydrating facial cleanser or OTC Cetaphil daily facial cleanser.    Do not use Old Spice body wash at this time.    Avoid use of    Scented/perfumed products    Irritating clothing (wool, new jeans, new/unwashed clothing, scratchy synthetics)    Neosporin or triple antibiotic topical products    Products containing aloe, herbs, Vitamin E, or other \"natural ingredients\".    Dryer sheets or fabric softeners (while symptoms are present)    If a topical medication is prescribed, apply topical prescription first, followed by use of moisturizing product.    TOPICAL STEROID INSTRUCTIONS  Triamcinolone 0.1% cream.    Apply an amount equal to half of a fingertip (0.25 g) to the affected area(s) on the back, arms and legs, two times per day for 10-14 days.    \"Fingertip Amount\"      Not to be used on face or groin.    After initial treatment, topical steroid may be used as needed for flare-ups.    Topical steroid use is for short-term treatment only. If after initial treatment, you are using this for prolonged periods of time, return to clinic for re-evaluation of treatment.    Keep in mind to also regularly use moisturizer, as this preventative measure can help maintain your skin's natural moisture barrier.    ORAL ANTIHISTAMINE  Take by mouth, 1 tablet(s) of OTC cetirizine (Zyrtec) 10 mg once per day for 2 weeks.    The patient was counseled to use products free of " fragrance, dyes, and plants. The importance of using bland cleansers and the regular use of heavy bland emollient creams was impressed upon the patient.      PROCEDURES:   None.    TT : 20 minutes.  CT : 15 minutes.      The information in this document, created by the medical scribe for me, accurately reflects the services I personally performed and the decisions made by me. I have reviewed and approved this document for accuracy prior to leaving the patient care area.  Ludmila Posey MD April 26, 2018 9:08 AM  [facility] [place]

## 2018-04-26 NOTE — LETTER
4/26/2018         RE: Denver Brewer  [address]   [facility] [address]        Dear Colleague,    Thank you for referring your patient, Denver Brewer, to the [facility] [place]. Please see a copy of my visit note below.    [facility] - PRIMARY CARE SKIN    CC : Rash   SUBJECTIVE:   Denver Brewer is a 22 year old male who presents to clinic today because of a(n) rash beginning approximately 1.5 months ago on the forearms, upper legs and back. Itchy bumps persist for days-weeks after onset. He denies any involvement of lower legs, face, chest and abdomen at any time.    Keratosis pilaris bumpy texture of the upper arms has been a chronic issue.    Pruritic : YES.  Symptoms appear to be : worsening.  Aggravating factors : scratching.  Relieving factors : none identified.    Previous similar history : NO.  Recent exposure history : none known. He denies any cold or flu prior to onset of itchiness. He has gone swimming at a gym prior to beginning of this episode. He does not shower within 2 hours of swimming.  Any other family members with similar symptoms : He lives alone.    Therapies tried : None.  Products used : No moisturizers are used. He uses Old Spice body wash and has done so long-term.    Personal Medical History  Skin Cancer : NO  Eczema Psoriasis Rosacea Autoimmune   NO NO NO NO     Family Medical History  Skin Cancer : NO  Eczema Psoriasis Rosacea Autoimmune   NO NO NO NO     Occupation : works at a store (indoor).    Patient Active Problem List   Diagnosis     Attention deficit hyperactivity disorder (ADHD), unspecified ADHD type     Anxiety       Past Medical History:   Diagnosis Date     ADHD (attention deficit hyperactivity disorder)     No past surgical history on file.   Social History   Substance Use Topics     Smoking status: Never Smoker     Smokeless tobacco: Never Used     Alcohol use Yes      Comment: rare    Family History     Problem (# of Occurrences)  Relation (Name,Age of Onset)    Family History Negative (1) Other           Prescription Medications as of 4/26/2018             methylphenidate (METADATE CD) 20 MG CR capsule Take 1 capsule (20 mg) by mouth every morning    triamcinolone (KENALOG) 0.1 % cream Apply to AA on arms, legs and trunk bid for 2 weeks            Allergies   Allergen Reactions     Cats         INTEGUMENTARY/SKIN: POSITIVE for pruritis and rash    ROS : 14 point review of systems was negative except the symptoms listed above in the HPI.    This document serves as a record of the services and decisions personally performed and made by Ludmila Posey MD. It was created on her behalf by Odell Valdivia, a trained medical scribe.  The creation of this document is based on the scribe's personal observations and the provider's statements to the medical scribe.  Odell Valdivia, April 26, 2018 9:08 AM      OBJECTIVE:   GENERAL: healthy, alert and in no distress  SKIN: Car Skin Type - II.  Face, Neck, Trunk, Arms, Legs and Hands were examined. The dermatoscope was used to help evaluate pigmented lesions.  Skin Pertinent Findings:  Patient visibly scratches throughout the office visit.    Scattered multiple erythematous maculopapular eruption with no scaling on left forearm, right thigh but not in linear distribution.    Back : Erythematous micah patches and papules on the lower back    Some dermatographism on right forearm.    Keratosis pilaris on upper arms.    Abdomen and chest : Clear.    Diagnostic Test Results:  none           ASSESSMENT:     Encounter Diagnoses   Name Primary?     Eczema, unspecified type Yes     Pruritic disorder      Keratosis pilaris          PLAN:   Patient Instructions   FUTURE APPOINTMENTS  Follow up as needed in 2 weeks if itchiness is not improving.    Avoid scratching, as scratching will continue the itch-scratch cycle. The following treatments will help to minimize itch sensations.    DRY SKIN MANAGEMENT  "INSTRUCTIONS  Routine use of moisturizer is important for healthy, resilient skin not just for soft skin. Continue applying moisturizer every day even after symptoms resolve.    Sealing in moisture    Twice daily use of a moisturizer such as over-the-counter (OTC) CeraVe moisturizer cream (in the jar). CeraVe products contain ceramides and filaggrin proteins that can help to maintain the body's moisture layer.    After cleansing or washing, always apply moisturizer immediately after drying off (pat dry only) for best effect.    Protection while hydrating    Do not overuse soap. Unless you have been sweating extensively, just apply soap to groin and armpits.    Recommended products for body include: OTC unscented Dove for sensitive skin or OTC Vanicream cleansing bar.    Recommended facial cleansers include: OTC CeraVe hydrating facial cleanser or OTC Cetaphil daily facial cleanser.    Do not use Old Spice body wash at this time.    Avoid use of    Scented/perfumed products    Irritating clothing (wool, new jeans, new/unwashed clothing, scratchy synthetics)    Neosporin or triple antibiotic topical products    Products containing aloe, herbs, Vitamin E, or other \"natural ingredients\".    Dryer sheets or fabric softeners (while symptoms are present)    If a topical medication is prescribed, apply topical prescription first, followed by use of moisturizing product.    TOPICAL STEROID INSTRUCTIONS  Triamcinolone 0.1% cream.    Apply an amount equal to half of a fingertip (0.25 g) to the affected area(s) on the back, arms and legs, two times per day for 10-14 days.    \"Fingertip Amount\"      Not to be used on face or groin.    After initial treatment, topical steroid may be used as needed for flare-ups.    Topical steroid use is for short-term treatment only. If after initial treatment, you are using this for prolonged periods of time, return to clinic for re-evaluation of treatment.    Keep in mind to also regularly use " moisturizer, as this preventative measure can help maintain your skin's natural moisture barrier.    ORAL ANTIHISTAMINE  Take by mouth, 1 tablet(s) of OTC cetirizine (Zyrtec) 10 mg once per day for 2 weeks.    The patient was counseled to use products free of fragrance, dyes, and plants. The importance of using bland cleansers and the regular use of heavy bland emollient creams was impressed upon the patient.      PROCEDURES:   None.    TT : 20 minutes.  CT : 15 minutes.      The information in this document, created by the medical scribe for me, accurately reflects the services I personally performed and the decisions made by me. I have reviewed and approved this document for accuracy prior to leaving the patient care area.  Ludmila Posey MD April 26, 2018 9:08 AM  [facility]    Again, thank you for allowing me to participate in the care of your patient.        Sincerely,        Dayanna Posey MD

## 2018-04-26 NOTE — MR AVS SNAPSHOT
"              After Visit Summary   4/26/2018    Denver Brewer    MRN: [id number]           Patient Information     Date Of Birth          1995        Visit Information        Provider Department      4/26/2018 9:00 AM Dayanna Posey MD [facility]        Today's Diagnoses     Eczema, unspecified type    -  1    Pruritic disorder        Keratosis pilaris          Care Instructions    FUTURE APPOINTMENTS  Follow up as needed in 2 weeks if itchiness is not improving.    Avoid scratching, as scratching will continue the itch-scratch cycle. The following treatments will help to minimize itch sensations.    DRY SKIN MANAGEMENT INSTRUCTIONS  Routine use of moisturizer is important for healthy, resilient skin not just for soft skin. Continue applying moisturizer every day even after symptoms resolve.    Sealing in moisture    Twice daily use of a moisturizer such as over-the-counter (OTC) CeraVe moisturizer cream (in the jar). CeraVe products contain ceramides and filaggrin proteins that can help to maintain the body's moisture layer.    After cleansing or washing, always apply moisturizer immediately after drying off (pat dry only) for best effect.    Protection while hydrating    Do not overuse soap. Unless you have been sweating extensively, just apply soap to groin and armpits.    Recommended products for body include: OTC unscented Dove for sensitive skin or OTC Vanicream cleansing bar.    Recommended facial cleansers include: OTC CeraVe hydrating facial cleanser or OTC Cetaphil daily facial cleanser.    Do not use Old Spice body wash at this time.    Avoid use of    Scented/perfumed products    Irritating clothing (wool, new jeans, new/unwashed clothing, scratchy synthetics)    Neosporin or triple antibiotic topical products    Products containing aloe, herbs, Vitamin E, or other \"natural ingredients\".    Dryer sheets or fabric softeners (while symptoms are present)    If a " "topical medication is prescribed, apply topical prescription first, followed by use of moisturizing product.    TOPICAL STEROID INSTRUCTIONS  Triamcinolone 0.1% cream.    Apply an amount equal to half of a fingertip (0.25 g) to the affected area(s) on the back, arms and legs, two times per day for 10-14 days.    \"Fingertip Amount\"      Not to be used on face or groin.    After initial treatment, topical steroid may be used as needed for flare-ups.    Topical steroid use is for short-term treatment only. If after initial treatment, you are using this for prolonged periods of time, return to clinic for re-evaluation of treatment.    Keep in mind to also regularly use moisturizer, as this preventative measure can help maintain your skin's natural moisture barrier.    ORAL ANTIHISTAMINE  Take by mouth, 1 tablet(s) of OTC cetirizine (Zyrtec) 10 mg once per day for 2 weeks.          Follow-ups after your visit        Who to contact     If you have questions or need follow up information about today's clinic visit or your schedule please contact [facility] [place] directly at [phone].  Normal or non-critical lab and imaging results will be communicated to you by GT Nexushart, letter or phone within 4 business days after the clinic has received the results. If you do not hear from us within 7 days, please contact the clinic through GT Nexushart or phone. If you have a critical or abnormal lab result, we will notify you by phone as soon as possible.  Submit refill requests through [employer] or call your pharmacy and they will forward the refill request to us. Please allow 3 business days for your refill to be completed.          Additional Information About Your Visit        MyChart Information     [employer]uckyL[employer] lets you send messages to your doctor, view your test results, renew your prescriptions, schedule appointments and more. To sign up, go to [email or web address] . Click on \"Log in\" on the left side of the screen, " "which will take you to the Welcome page. Then click on \"Sign up Now\" on the right side of the page.     You will be asked to enter the access code listed below, as well as some personal information. Please follow the directions to create your username and password.     Your access code is: [email or web address]  Expires: 2018 11:49 AM     Your access code will  in 90 days. If you need help or a new code, please call your [facility] or [phone].        Care EveryWhere ID     This is your Care EveryWhere ID. This could be used by other organizations to access your [place] medical records  [id number]        Your Vitals Were     Pulse Pulse Oximetry                83 100%           Blood Pressure from Last 3 Encounters:   18 126/80   18 100/68   17 124/82    Weight from Last 3 Encounters:   18 156 lb 12.8 oz (71.1 kg)   17 154 lb (69.9 kg)   12/24/15 140 lb (63.5 kg)              Today, you had the following     No orders found for display         Today's Medication Changes          These changes are accurate as of 18  9:28 AM.  If you have any questions, ask your nurse or doctor.               Start taking these medicines.        Dose/Directions    triamcinolone 0.1 % cream   Commonly known as:  KENALOG   Used for:  Eczema, unspecified type   Started by:  Dayanna Posey MD        Apply to AA on arms, legs and trunk bid for 2 weeks   Quantity:  80 g   Refills:  0            Where to get your medicines      These medications were sent to [facility] - [place], [place] - [address] AT [facility] OF [facility] [address] & [facility]  [address] [place] [address] [address]     Phone:  [phone]     triamcinolone 0.1 % cream                Primary Care Provider Office Phone # Fax #    Jacob Ambrose [phone]       [address]  [place] [address]        Equal Access to Services     THOM DAVIS AH: Misty swanson " adi Harrell, marcelleda lurenettaadaha, qashaniceta kagrayda louie, cassie figueroa. So [facility] [phone].    ATENCIÓN: Si habla español, tiene a romero disposición servicios gratuitos de asistencia lingüística. Anderson al [phone].    We comply with applicable federal civil rights laws and [place] laws. We do not discriminate on the basis of race, color, national origin, age, disability, sex, sexual orientation, or gender identity.            Thank you!     [facility]. Our goal is always to provide you with excellent care. Hearing back from our patients is one way we can continue to improve our services. Please take a few minutes to complete the written survey that you may receive in the mail after your visit with us. Thank you!             Your Updated Medication List - Protect others around you: Learn how to safely use, store and throw away your medicines at www.disposemymeds.org.          This list is accurate as of 4/26/18  9:28 AM.  Always use your most recent med list.                   Brand Name Dispense Instructions for use Diagnosis    methylphenidate 20 MG CR capsule    METADATE CD    30 capsule    Take 1 capsule (20 mg) by mouth every morning    Attention deficit hyperactivity disorder (ADHD), unspecified ADHD type       triamcinolone 0.1 % cream    KENALOG    80 g    Apply to AA on arms, legs and trunk bid for 2 weeks    Eczema, unspecified type

## 2018-05-07 DIAGNOSIS — F90.9 ATTENTION DEFICIT HYPERACTIVITY DISORDER (ADHD), UNSPECIFIED ADHD TYPE: ICD-10-CM

## 2018-05-07 NOTE — TELEPHONE ENCOUNTER
Reason for Call:  Other prescription    Detailed comments: Pt called this afternoon and needs a refill on his metadate. Please refill this and give pt a call once it is ready. Thank you.    Phone Number Patient can be reached at: Home number on file [phone] (home)    Best Time:     Can we leave a detailed message on this number? YES    Call taken on 5/7/2018 at 6:27 PM by Nubia Clinton

## 2018-05-08 RX ORDER — METHYLPHENIDATE HYDROCHLORIDE 20 MG/1
20 CAPSULE, EXTENDED RELEASE ORAL EVERY MORNING
Qty: 30 CAPSULE | Refills: 0 | Status: SHIPPED | OUTPATIENT
Start: 2018-05-08 | End: 2018-06-12

## 2018-05-08 NOTE — TELEPHONE ENCOUNTER
Script/consent/label placed at the  for pickup with picture ID  Patient informed  Ronda JAEGER

## 2018-05-08 NOTE — TELEPHONE ENCOUNTER
methylphenidate (METADATE CD) 20 MG CR capsule      Last Written Prescription Date:  4-  Last Fill Quantity: 30 capsule,   # refills: 0  Last Office Visit: 3-1-2018  Future Office visit:       Routing refill request to provider for review/approval because:  Drug not on the [facility], P or [facility] refill protocol or controlled substance

## 2018-06-12 DIAGNOSIS — F90.9 ATTENTION DEFICIT HYPERACTIVITY DISORDER (ADHD), UNSPECIFIED ADHD TYPE: ICD-10-CM

## 2018-06-12 NOTE — TELEPHONE ENCOUNTER
Reason for Call:  Medication or medication refill:    Do you use a [facility]?  Name of the pharmacy and phonnumber for the current request:  WRITTEN RX  To be picked up at     Name of the medication requested: methylphenidate (METADATE CD) 20 MG CR capsule    Other request: Written RX to be picked up at     Can we leave a detailed message on this number? NO    Phone number patient can be reached at: Cell number on file:    Telephone Information:   Mobile [phone]       Best Time:  any    Call taken on 6/12/2018 at 4:11 PM by Yolis [place]

## 2018-06-13 RX ORDER — METHYLPHENIDATE HYDROCHLORIDE 20 MG/1
20 CAPSULE, EXTENDED RELEASE ORAL EVERY MORNING
Qty: 30 CAPSULE | Refills: 0 | Status: SHIPPED | OUTPATIENT
Start: 2018-06-13 | End: 2018-07-17

## 2018-06-13 NOTE — TELEPHONE ENCOUNTER
Methylphenidate 20mg      Last Written Prescription Date:  5/8/18  Last Fill Quantity: 30,   # refills: 0  Last Office Visit: 3/1/18  Future Office visit:       Routing refill request to provider for review/approval because:  Drug not on the [facility], P or [facility] refill protocol or controlled substance    Maday Hoffmann CMA

## 2018-07-16 ENCOUNTER — MYC REFILL (OUTPATIENT)
Dept: FAMILY MEDICINE | Facility: CLINIC | Age: 23
End: 2018-07-16

## 2018-07-16 DIAGNOSIS — F90.9 ATTENTION DEFICIT HYPERACTIVITY DISORDER (ADHD), UNSPECIFIED ADHD TYPE: ICD-10-CM

## 2018-07-16 RX ORDER — METHYLPHENIDATE HYDROCHLORIDE 20 MG/1
20 CAPSULE, EXTENDED RELEASE ORAL EVERY MORNING
Qty: 30 CAPSULE | Refills: 0 | Status: CANCELLED | OUTPATIENT
Start: 2018-07-16

## 2018-07-16 NOTE — TELEPHONE ENCOUNTER
methylphenidate (METADATE CD) 20 MG CR capsule     Last Written Prescription Date:  6-  Last Fill Quantity: 30 capsule,   # refills: 0  Last Office Visit: 3-1-2018  Future Office visit:       Routing refill request to provider for review/approval because:  Drug not on the [facility], P or [facility] refill protocol or controlled substance

## 2018-07-16 NOTE — TELEPHONE ENCOUNTER
Message from Digerati:  Original authorizing provider: MD Denver Landin GARRETTHelio Daniella would like a refill of the following medications:  methylphenidate (METADATE CD) 20 MG CR capsule [Jacob Ambrose MD]    Preferred pharmacy: [facility] [id number] [place] [address] [address] AT [facility] OF [address]    Comment:

## 2018-07-17 ENCOUNTER — MYC REFILL (OUTPATIENT)
Dept: FAMILY MEDICINE | Facility: CLINIC | Age: 23
End: 2018-07-17

## 2018-07-17 DIAGNOSIS — F90.9 ATTENTION DEFICIT HYPERACTIVITY DISORDER (ADHD), UNSPECIFIED ADHD TYPE: ICD-10-CM

## 2018-07-17 RX ORDER — METHYLPHENIDATE HYDROCHLORIDE 20 MG/1
20 CAPSULE, EXTENDED RELEASE ORAL EVERY MORNING
Qty: 30 CAPSULE | Refills: 0 | Status: SHIPPED | OUTPATIENT
Start: 2018-07-17 | End: 2018-09-04

## 2018-07-17 NOTE — TELEPHONE ENCOUNTER
Message from [email or web address]:  Original authorizing provider: MD Denver Landin GARRETTHelio Daniella would like a refill of the following medications:  methylphenidate (METADATE CD) 20 MG CR capsule [Jacob Ambrose MD]    Preferred pharmacy: [facility] [id number] [place] [address] [address] AT [facility] OF [address]    Comment:

## 2018-07-17 NOTE — TELEPHONE ENCOUNTER
metadate      Last Written Prescription Date:  6/13/18  Last Fill Quantity: 30,   # refills: 0  Last Office Visit: 3/1/18  Future Office visit:       Routing refill request to provider for review/approval because:  Drug not on the [facility], [facility] or  Health refill protocol or controlled substance    RX monitoring program ([place]) reviewed:  reviewed- no concerns    [place] profile:  [email or web address]    Heather Neil RN   [facility] - Triage

## 2018-08-08 ENCOUNTER — OFFICE VISIT (OUTPATIENT)
Dept: FAMILY MEDICINE | Facility: CLINIC | Age: 23
End: 2018-08-08
Payer: COMMERCIAL

## 2018-08-08 VITALS
OXYGEN SATURATION: 97 % | SYSTOLIC BLOOD PRESSURE: 123 MMHG | HEART RATE: 74 BPM | TEMPERATURE: 98.4 F | BODY MASS INDEX: 26.15 KG/M2 | WEIGHT: 162 LBS | DIASTOLIC BLOOD PRESSURE: 78 MMHG

## 2018-08-08 DIAGNOSIS — S16.1XXA STRAIN OF NECK MUSCLE, INITIAL ENCOUNTER: Primary | ICD-10-CM

## 2018-08-08 PROCEDURE — 99213 OFFICE O/P EST LOW 20 MIN: CPT | Performed by: INTERNAL MEDICINE

## 2018-08-08 NOTE — MR AVS SNAPSHOT
After Visit Summary   8/8/2018    Denver Brewer    MRN: [id number]           Patient Information     Date Of Birth          1995        Visit Information        Provider Department      8/8/2018 10:40 AM Jacklyn Min MD [facility] [place]        Today's Diagnoses     Strain of neck muscle, initial encounter    -  1       Follow-ups after your visit        Additional Services     [facility] PT, HAND, AND CHIROPRACTIC REFERRAL       **This order will print in the [facility] Scheduling Office**    Physical Therapy, Hand Therapy and Chiropractic Care are available through:    *[facility]  *[facility]  *[facility]    Call one number to schedule at any of the above locations: [phone].    Your provider has referred you to: Chiropractic at [facility] or [facility]    Indication/Reason for Referral: neck pain  Onset of Illness: 3 months, on and off  Therapy Orders: Evaluate and Treat  Special Programs: None  Special Request: None    Eliza Kay      Additional Comments for the Therapist or Chiropractor:     Please be aware that coverage of these services is subject to the terms and limitations of your health insurance plan.  Call member services at your health plan with any benefit or coverage questions.      Please bring the following to your appointment:    *Your personal calendar for scheduling future appointments  *Comfortable clothing                  Who to contact     If you have questions or need follow up information about today's clinic visit or your schedule please contact [facility] [place] directly at [phone].  Normal or non-critical lab and imaging results will be communicated to you by MyChart, letter or phone within 4 business days after the clinic has received the results. If you do not hear from us within 7 days, please contact the clinic through MyChart or phone. If you have a critical or abnormal lab result, we  will notify you by phone as soon as possible.  Submit refill requests through mob[employer]DEOS or call your pharmacy and they will forward the refill request to us. Please allow 3 business days for your refill to be completed.          Additional Information About Your Visit        [place] Information     mobiDEOS gives you secure access to your electronic health record. If you see a primary care provider, you can also send messages to your care team and make appointments. If you have questions, please call your primary care clinic.  If you do not have a primary care provider, please call [phone] and they will assist you.        Care EveryWhere ID     This is your Care EveryWhere ID. This could be used by other organizations to access your [place] medical records  [id number]        Your Vitals Were     Pulse Temperature Pulse Oximetry BMI (Body Mass Index)          74 98.4  F (36.9  C) (Oral) 97% 26.15 kg/m2         Blood Pressure from Last 3 Encounters:   08/08/18 123/78   04/26/18 126/80   03/01/18 100/68    Weight from Last 3 Encounters:   08/08/18 162 lb (73.5 kg)   03/01/18 156 lb 12.8 oz (71.1 kg)   02/14/17 154 lb (69.9 kg)              We Performed the Following     [facility] PT, HAND, AND CHIROPRACTIC REFERRAL        Primary Care Provider Office Phone # Fax #    Jacob Ambrose [phone]       [address]  [place] [address]        Equal Access to Services     [facility]: Hadii aad ku hadasho Soomaali, waaxda luqadaha, qaybta kaalmada adeegyada, cassie gould adesalas rao . So [facility] [phone].    ATENCIÓN: Si habla español, tiene a romero disposición servicios gratuitos de asistencia lingüística. Llame al [phone].    We comply with applicable federal civil rights laws and [place] laws. We do not discriminate on the basis of race, color, national origin, age, disability, sex, sexual orientation, or gender identity.            Thank you!     Thank you for choosing  [facility] [place]  for your care. Our goal is always to provide you with excellent care. Hearing back from our patients is one way we can continue to improve our services. Please take a few minutes to complete the written survey that you may receive in the mail after your visit with us. Thank you!             Your Updated Medication List - Protect others around you: Learn how to safely use, store and throw away your medicines at www.disposemymeds.org.          This list is accurate as of 8/8/18 10:58 AM.  Always use your most recent med list.                   Brand Name Dispense Instructions for use Diagnosis    methylphenidate 20 MG CR capsule    METADATE CD    30 capsule    Take 1 capsule (20 mg) by mouth every morning    Attention deficit hyperactivity disorder (ADHD), unspecified ADHD type

## 2018-08-08 NOTE — PROGRESS NOTES
SUBJECTIVE:   Denver Brewer is a 23 year old male who presents to clinic today for the following health issues:      Neck Pain  Onset:  3 months     Description:   Location: lower and upper neck   Radiation: none    Intensity: moderate    Progression of Symptoms:  worsening    Accompanying Signs & Symptoms:  Burning, prickly sensation (paresthesias) in arm(s): no   Numbness in arm(s): no   Weakness in arm(s):  no   Fever: no   Headache: YES  Nausea and/or vomiting: no     History:   Trauma: no   Previous neck pain: YES  Previous surgery or injections: no   Previous Imaging (MRI,X ray): YES    Precipitating factors:   Does movement increase the pain:  YES    Alleviating factors:      Therapies Tried and outcome:  Heat, ice Advil       Problem list and histories reviewed & adjusted, as indicated.  Additional history: as documented    Patient Active Problem List   Diagnosis     Attention deficit hyperactivity disorder (ADHD), unspecified ADHD type     Anxiety     No past surgical history on file.    Social History   Substance Use Topics     Smoking status: Never Smoker     Smokeless tobacco: Never Used     Alcohol use Yes      Comment: rare     Family History   Problem Relation Age of Onset     Family History Negative Other            Reviewed and updated as needed this visit by clinical staff       Reviewed and updated as needed this visit by Provider         ROS:  Constitutional, HEENT, cardiovascular, pulmonary, gi and gu systems are negative, except as otherwise noted.    OBJECTIVE:     /78 (BP Location: Left arm, Cuff Size: Adult Regular)  Pulse 74  Temp 98.4  F (36.9  C) (Oral)  Wt 162 lb (73.5 kg)  SpO2 97%  BMI 26.15 kg/m2  Body mass index is 26.15 kg/(m^2).  GENERAL: healthy, alert and no distress  NECK/MSK: Tenderness over right trapezius/scm muscle. ROM is normal but there is discomfort with rightward rotation, backwards extension, and also tilting ear to the left  RESP: lungs clear to  auscultation - no rales, rhonchi or wheezes  CV: regular rate and rhythm, normal S1 S2, no S3 or S4, no murmur, click or rub, no peripheral edema and peripheral pulses strong    Diagnostic Test Results:  none     ASSESSMENT/PLAN:       1. Strain of neck muscle, initial encounter  22 y/o male with neck muscle strain, likely overuse (uses smart phone often) and poor posture. Handout given with stretches to do for the neck. Recommending evaluation by chiropractor at [facility].  - [facility] PT, HAND, AND CHIROPRACTIC REFERRAL    Follow up if no improvement in symptoms      Jacklyn Min MD  [facility] [place]

## 2018-08-22 ENCOUNTER — THERAPY VISIT (OUTPATIENT)
Dept: CHIROPRACTIC MEDICINE | Facility: CLINIC | Age: 23
End: 2018-08-22
Payer: COMMERCIAL

## 2018-08-22 DIAGNOSIS — M99.01 CERVICAL SEGMENT DYSFUNCTION: ICD-10-CM

## 2018-08-22 DIAGNOSIS — G89.29 CHRONIC BILATERAL LOW BACK PAIN WITHOUT SCIATICA: ICD-10-CM

## 2018-08-22 DIAGNOSIS — M54.2 CERVICALGIA: ICD-10-CM

## 2018-08-22 DIAGNOSIS — M99.03 SOMATIC DYSFUNCTION OF LUMBAR REGION: Primary | ICD-10-CM

## 2018-08-22 DIAGNOSIS — M54.50 CHRONIC BILATERAL LOW BACK PAIN WITHOUT SCIATICA: ICD-10-CM

## 2018-08-22 DIAGNOSIS — R51.9 CEPHALGIA: ICD-10-CM

## 2018-08-22 PROCEDURE — 97112 NEUROMUSCULAR REEDUCATION: CPT | Mod: 59 | Performed by: CHIROPRACTOR

## 2018-08-22 PROCEDURE — 99203 OFFICE O/P NEW LOW 30 MIN: CPT | Mod: 25 | Performed by: CHIROPRACTOR

## 2018-08-22 PROCEDURE — 98941 CHIROPRACT MANJ 3-4 REGIONS: CPT | Mod: AT | Performed by: CHIROPRACTOR

## 2018-08-23 PROBLEM — M54.50 CHRONIC BILATERAL LOW BACK PAIN WITHOUT SCIATICA: Status: ACTIVE | Noted: 2018-08-23

## 2018-08-23 PROBLEM — M54.2 CERVICALGIA: Status: ACTIVE | Noted: 2018-08-23

## 2018-08-23 PROBLEM — M99.03 SOMATIC DYSFUNCTION OF LUMBAR REGION: Status: ACTIVE | Noted: 2018-08-23

## 2018-08-23 PROBLEM — R51.9 CEPHALGIA: Status: ACTIVE | Noted: 2018-08-23

## 2018-08-23 PROBLEM — G89.29 CHRONIC BILATERAL LOW BACK PAIN WITHOUT SCIATICA: Status: ACTIVE | Noted: 2018-08-23

## 2018-08-23 PROBLEM — M99.01 CERVICAL SEGMENT DYSFUNCTION: Status: ACTIVE | Noted: 2018-08-23

## 2018-08-23 NOTE — PROGRESS NOTES
Initial Chiropractic Clinic Visit    PCP: Jacob Ambrose    Denver Brewer is a 23 year old male who is seen  in consultation at the request of  Jacklyn Min M.D. presenting with chronic low back pain and neck pain. The pt states he has had a hx of chronic neck and low back pain that started from a neck injury as a child in addition to sports activities.Patient reports that the onset was more than 10 years ago however increased in the past several months. He experiences associated HA sx. He has had massage in the past however only temporary. Sitting and arleth will increase the spine pain. Flexing the neck will increase the pain. Turning his head will increase the pain.  He grades the px a 4-6/10 on VAS.   When asked, patient denies:, falling, slipping, bending and reaching or sleeping awkwardly. The pt denies weakness in the extremities or other unusual sx.  Prior to onset, the patient was able to bend the head. Patient notes that due to symptoms, they can only turn the neck slightly in either direction. Denver Brewer notes   sitting rated at a 6/10 painful, difficult and prior to this incident it was 1/10.        Injury: There was no acute injury related to this episode     Location of Pain: bilateral cervical B low back and pelvis  at the following level(s) C2 , C3 , C7 , T1 , T5 , T9 , L5 , Sacrum  and PSIS Right   Duration of Pain: over 10 years ago   Rating of Pain at worst: 6/10  Rating of Pain Currently: 4/10  Symptoms are better with: massage   Symptoms are worse with: sitting and turning the head and bending   Additional Features: associated HA sx     Health History  as reported by the patient:    How does the patient rate their own health:   Fair    Current or past medical history:   No red flags identified    Medical allergies  None    Past Traumas/Surgeries  None        Review of Systems  Musculoskeletal: as above  Remainder of review of systems is negative including constitutional, CV, pulmonary,  "GI, Skin and Neurologic except as noted in HPI or medical history.    Past Medical History:   Diagnosis Date     ADHD (attention deficit hyperactivity disorder)      No past surgical history on file.    Objective  There were no vitals taken for this visit.    GENERAL APPEARANCE: healthy, alert and no distress   GAIT: NORMAL  SKIN: no suspicious lesions or rashes  NEURO: Normal strength and tone, mentation intact and speech normal  PSYCH:  mentation appears normal and affect normal/bright      Denver was asked to complete the Neck Disability Index, the Oswestry Low Back Disability Index and Eliza Start Back screening tool, today in the office. NDI Disability score: 30%; pain severity scale: 6/10., The Oswestry Disability score: 16%. Keel Start Total Score:pending  Sub Score: pending        Cervical Spine Exam    Range of Motion:         Full active and passive ROM forward flexion,   Decreased extension, lateral rotation, lateral flexion with pain at end range     Inspection:         No visible deformity        normal lordotic curvature maintained  Anterior head carriage, slumped seated posture      Tender:        upper border of trapezius       B suboccipitals     Non-Tender:        remainder of cervical spine area    Muscle strength:       C4 (shoulder shrug)  symmetric 5/5 Normal       C5 (shoulder abduction) symmetric 5/5 Normal       C6 (elbow flexion) symmetric 5/5 Normal       C7 (elbow extension) symmetric 5/5 Normal       C8 (finger abduction, thumb flexion) symmetric 5/5 Normal    Reflexes:        C5 (biceps) symmetric 2 bilaterally       C6 (supinator) symmetric 2 bilaterally       C7 (triceps) symmetric 2 bilaterally      Sensation:       grossly intact througout bilateral upper extremities    Special Tests:       positive (+) Spurling  Kristopher's- positive, VBI- negative and Sierra Link - negative    Lymphatics:        no edema noted in the upper extremities       Lumbar exam:    Inspection:  \"     no visible " "deformity in the low back       normal skin\"  Slumped seated posture, increased thoracic kyphosis poor seated posture    ROM:       limited flexion due to pain       limited extension due to pain    Tender:       paraspinal muscles       B QL     Non Tender:       remainder of lumbar spine    Strength:       hip flexion 5/5 Normal       knee extension 5/5 Normal       ankle dorsiflexion 5/5 Normal       ankle plantarflexion 5/5 Normal       dorsiflexion of the great toe 5/5 Normal    Reflexes:       patellar (L3, L4) 2 bilaterally       achilles tendons (S1) 2 bilaterally        Sensation:      grossly intact throughout lower extremities    Special tests:  Kemps - Right positive, low back pain and Left negative, SLR - Right negative and Left negative, Gaenslen's - Right negative and Left negative and Fabere - Right negative and Left negative    Segmental spinal dysfunction/restrictions found at:C2, C5, T1, T9, L4, SACRUM, R PSIS     Radiology:  none    Assessment:    1. Somatic dysfunction of lumbar region    2. Chronic bilateral low back pain without sciatica    3. Cervical segment dysfunction    4. Cervicalgia    5. Cephalgia        RX ordered/plan of care  Anticipated outcomes  Possible risks and side effects    After discussing the risk and benefits of care, patient consented to treatment    Prognosis: Excellent      Patient's condition:  Patient had restrictions pre-manipulation    Treatment effectiveness:  Post manipulation there is better intersegmental movement and Patient claims to feel looser post manipulation      Plan:    Procedures:  Evaluation and Management  60583 Moderate level exam 30 min    CMT:  42036 Chiropractic manipulative treatment 3-4 regions performed   Cervical: Diversified, C2, C7 , Prone, Supine  Thoracic: Diversified, T1, T6, T9, Prone  Lumbar: Diversified, L5, Side posture  Pelvis: Diversified, Sacrum , PSIS Right , Prone    Modalities:  None performed this visit    Therapeutic " procedures:  82863: Neurological re-education/proprioception training and proper long term sitting posture: Corrected patient's seated posture when sitting for longer than 20 minutes or seated at the computer related to work duties for over 8 hours per day. Fit patient with Josselin lumbar support for postural re-training with demonstration. Showed patient how to place the support correctly when seated and to increase usage by 2-3 hour increments per day until they are able to sit full time without spinal irritation with demonstration. Related improper vs. proper sitting to spinal biomechanics using the spine model with demonstration with the purpose of PREVENTING premature spinal degeneration from cumulative static motion. Demonstrated the increase in load and shearing forces on the spine in addition to the cumulative degenerative effects of axial compression on a spine that is chronically slumped and in an 'unlocked' position vs a 'locked' position. Gave cervical retraction for proper cervical alignment, anterior deep cervical flexor strengthening and proprioception training with demonstration. Per 10 minutes total    Response to Treatment  Reduction in symptoms as reported by patient    Treatment plan and goals:  Goals:  Turning neck in either direction  Bending the neck    Frequency of care  Duration of care is estimated to be 6-8 weeks, from the initial treatment.  It is estimated that the patient will need a total of 6-8 visits to resolve this episode.  For the initial therapeutic trial of care, the frequency is recommended at 1 X week, once daily.  A reevaluation would be clinically appropriate in 6-8 visits, to determine progress and further course of care.    In-Office Treatment  Evaluation  Spinal Chiropractic Manipulative Therapy  ACP discussion        Recommendations: ACP    Instructions:use lumbar support at all times     Follow-up:    Return to care in 1 week with US therapy.       Disclaimer: This  note consists of symbols derived from keyboarding, dictation and/or voice recognition software. As a result, there may be errors in the script that have gone undetected. Please consider this when interpreting information found in this chart.

## 2018-08-24 ENCOUNTER — THERAPY VISIT (OUTPATIENT)
Dept: CHIROPRACTIC MEDICINE | Facility: CLINIC | Age: 23
End: 2018-08-24
Payer: COMMERCIAL

## 2018-08-24 DIAGNOSIS — M54.2 CERVICALGIA: ICD-10-CM

## 2018-08-24 DIAGNOSIS — M99.03 SOMATIC DYSFUNCTION OF LUMBAR REGION: Primary | ICD-10-CM

## 2018-08-24 DIAGNOSIS — M54.50 CHRONIC BILATERAL LOW BACK PAIN WITHOUT SCIATICA: ICD-10-CM

## 2018-08-24 DIAGNOSIS — G89.29 CHRONIC BILATERAL LOW BACK PAIN WITHOUT SCIATICA: ICD-10-CM

## 2018-08-24 DIAGNOSIS — M99.01 CERVICAL SEGMENT DYSFUNCTION: ICD-10-CM

## 2018-08-24 DIAGNOSIS — R51.9 CEPHALGIA: ICD-10-CM

## 2018-08-24 PROCEDURE — 97035 APP MDLTY 1+ULTRASOUND EA 15: CPT | Performed by: CHIROPRACTOR

## 2018-08-24 PROCEDURE — 98941 CHIROPRACT MANJ 3-4 REGIONS: CPT | Mod: AT | Performed by: CHIROPRACTOR

## 2018-08-25 NOTE — PROGRESS NOTES
Visit #:  2    Subjective:  Denver Brewer is a 23 year old male who is seen in f/u up for:        Somatic dysfunction of lumbar region  Chronic bilateral low back pain without sciatica  Cervical segment dysfunction  Cervicalgia  Cephalgia.     Since last visit on 8/22/2018,  Denver Brewer reports:    Area of chief complaint:  Cervical, Thoracic and Lumbar :  Symptoms are graded at 6/10. The quality is described as stiff, achey, dull.  Motion has increased, but is still not normal. The pt reports 20% less overall pain in the neck area post treatment. He notes pain across the low back area today. The pt is using the lumbar support with good results. He denies weakness in the extremities or other unusual sx.. Patient feels that they are improved due to a reduction in symptoms.     Since last visit the patient feels that they are 20 percent  improved from last visit.       Objective:  The following was observed:    P: palpatory tendernessLevator scapulae, Quad lumb and Sub-occipital Bilaterally  A: static palpation demonstrates intersegmental asymmetry   R: motion palpation notes restricted motion  T: hypertonicity at: Levator scapulae, Lumbar erector spine and Sub-occipital Bilaterally    Segmental spinal dysfunction/restrictions found at:  C2, C3, T7, T9, L3, SACRUM, R PSIS .      Assessment:    Diagnoses:      1. Somatic dysfunction of lumbar region    2. Chronic bilateral low back pain without sciatica    3. Cervical segment dysfunction    4. Cervicalgia    5. Cephalgia        Patient's condition:  Patient had restrictions pre-manipulation    Treatment effectiveness:  Post manipulation there is better intersegmental movement and Patient claims to feel looser post manipulation      Procedures:  CMT:  30295 Chiropractic manipulative treatment 3-4 regions performed   Cervical: Diversified, C2, C7 , Prone, Supine  Thoracic: Diversified, T1, T6, T9, Prone  Lumbar: Diversified,L4,  L5, Side posture  Pelvis:  Diversified, Sacrum , PSIS Right , Prone    Modalities:  16883: US:  1.5 Cota/cm squared for 8  minutes at 1 mhz  Location: B QL    Therapeutic procedures:  None    Response to Treatment  Reduction in symptoms as reported by patient    Prognosis: Good    Progress towards Goals: Patient is making progress towards the goal.Goals:  Turning neck in either direction  Bending the neck         Recommendations:    Instructions:continue to use the lumbar support     Follow-up:  Return to care in 1 week with US therapy.

## 2018-08-29 ENCOUNTER — THERAPY VISIT (OUTPATIENT)
Dept: CHIROPRACTIC MEDICINE | Facility: CLINIC | Age: 23
End: 2018-08-29
Payer: COMMERCIAL

## 2018-08-29 DIAGNOSIS — M54.50 CHRONIC BILATERAL LOW BACK PAIN WITHOUT SCIATICA: ICD-10-CM

## 2018-08-29 DIAGNOSIS — G44.201 INTRACTABLE TENSION-TYPE HEADACHE, UNSPECIFIED CHRONICITY PATTERN: ICD-10-CM

## 2018-08-29 DIAGNOSIS — M99.01 CERVICAL SEGMENT DYSFUNCTION: ICD-10-CM

## 2018-08-29 DIAGNOSIS — G89.29 CHRONIC BILATERAL LOW BACK PAIN WITHOUT SCIATICA: ICD-10-CM

## 2018-08-29 DIAGNOSIS — M99.03 SOMATIC DYSFUNCTION OF LUMBAR REGION: Primary | ICD-10-CM

## 2018-08-29 DIAGNOSIS — M54.2 CERVICALGIA: ICD-10-CM

## 2018-08-29 PROCEDURE — 98941 CHIROPRACT MANJ 3-4 REGIONS: CPT | Mod: AT | Performed by: CHIROPRACTOR

## 2018-08-29 PROCEDURE — 97035 APP MDLTY 1+ULTRASOUND EA 15: CPT | Performed by: CHIROPRACTOR

## 2018-08-29 NOTE — PROGRESS NOTES
Visit #:  3    Subjective:  Denver Brewer is a 23 year old male who is seen in f/u up for:        Somatic dysfunction of lumbar region  Chronic bilateral low back pain without sciatica  Cervical segment dysfunction  Cervicalgia  Intractable tension-type headache, unspecified chronicity pattern.     Since last visit,  Denver Brewer reports:    Area of chief complaint:  Cervical, Thoracic and Lumbar :  Symptoms are graded at 5/10. The quality is described as stiff, achey, dull.  Motion has increased, but is still not normal. The pt reports 40% less overall pain in the neck area post treatment. He notes L sided neck pain and shoulder pain. The quality is sharp when he turns or laterally bends the neck. He denies HA sx. The pt reports less pain in the low back. He is using the lumbar support with good results. The pt denies weakness or other unusual sx.     Since last visit the patient feels that they are 40 percent  improved from last visit.       Objective:  The following was observed:    P: palpatory tendernessLevator scapulae, Quad lumb and Sub-occipital Bilaterally  A: static palpation demonstrates intersegmental asymmetry   R: motion palpation notes restricted motion  T: hypertonicity at: Levator scapulae, Lumbar erector spine and Sub-occipital Bilaterally    Segmental spinal dysfunction/restrictions found at:  C2, C3, T7, T9, L3, SACRUM, R PSIS .      Assessment:    Diagnoses:      1. Somatic dysfunction of lumbar region    2. Chronic bilateral low back pain without sciatica    3. Cervical segment dysfunction    4. Cervicalgia    5. Intractable tension-type headache, unspecified chronicity pattern        Patient's condition:  Patient responding well to therapy     Treatment effectiveness:  Post manipulation there is better intersegmental movement and Patient claims to feel looser post manipulation      Procedures:  CMT:  80035 Chiropractic manipulative treatment 3-4 regions performed   Cervical: Diversified,  C2, C7 , Prone, Supine  Thoracic: Diversified, T1, T6, T9, Prone  Lumbar: Diversified,L4,  L5, Side posture  Pelvis: Diversified, Sacrum , PSIS Right , Prone    Modalities:  43878: US:  1.7 Cota/cm squared for 8  minutes at 1 mhz  Location: B cervical  Therapeutic procedures:  None    Response to Treatment  Reduction in symptoms as reported by patient    Prognosis: Good    Progress towards Goals: Patient is making progress towards the goal.Goals:  Turning neck in either direction  Bending the neck         Recommendations:    Instructions:continue to use the lumbar support     Follow-up:  Return to care in 1 week with US therapy.   ACP discussion

## 2018-09-04 DIAGNOSIS — F90.9 ATTENTION DEFICIT HYPERACTIVITY DISORDER (ADHD), UNSPECIFIED ADHD TYPE: ICD-10-CM

## 2018-09-04 NOTE — TELEPHONE ENCOUNTER
Reason for Call:  Medication or medication refill:    Do you use a [facility]?  Name of the pharmacy and phone number for the current request:  Will  Rx from the clinic.    Name of the medication requested: methylphenidate (METADATE CD) 20 MG CR capsule    Other request: no    Can we leave a detailed message on this number? YES    Phone number patient can be reached at: Home number on file [phone] (home)    Best Time: anytime    Call taken on 9/4/2018 at 5:56 PM by Julienne Verdugo

## 2018-09-04 NOTE — TELEPHONE ENCOUNTER
Methylphenidate 20mg      Last Written Prescription Date:  7/17/18  Last Fill Quantity: 30,   # refills: 0  Last Office Visit: Mechelle 8/8/18  Future Office visit:    Next 5 appointments (look out 90 days)     Sep 11, 2018 10:40 AM CDT   [place] Jacob Ambrose [place]   [facility] [place] [place]    [address] [place] [id number]   [phone]                   Routing refill request to provider for review/approval because:  Drug not on the [facility], [facility] or [facility] refill protocol or controlled substance    Maday Hoffmann CMA

## 2018-09-05 ENCOUNTER — THERAPY VISIT (OUTPATIENT)
Dept: CHIROPRACTIC MEDICINE | Facility: CLINIC | Age: 23
End: 2018-09-05
Payer: COMMERCIAL

## 2018-09-05 DIAGNOSIS — G44.201 INTRACTABLE TENSION-TYPE HEADACHE, UNSPECIFIED CHRONICITY PATTERN: ICD-10-CM

## 2018-09-05 DIAGNOSIS — M99.03 SOMATIC DYSFUNCTION OF LUMBAR REGION: Primary | ICD-10-CM

## 2018-09-05 DIAGNOSIS — M54.50 CHRONIC BILATERAL LOW BACK PAIN WITHOUT SCIATICA: ICD-10-CM

## 2018-09-05 DIAGNOSIS — M54.2 CERVICALGIA: ICD-10-CM

## 2018-09-05 DIAGNOSIS — M99.01 CERVICAL SEGMENT DYSFUNCTION: ICD-10-CM

## 2018-09-05 DIAGNOSIS — G89.29 CHRONIC BILATERAL LOW BACK PAIN WITHOUT SCIATICA: ICD-10-CM

## 2018-09-05 PROCEDURE — 98941 CHIROPRACT MANJ 3-4 REGIONS: CPT | Mod: AT | Performed by: CHIROPRACTOR

## 2018-09-05 PROCEDURE — 97810 ACUP 1/> WO ESTIM 1ST 15 MIN: CPT | Mod: GA | Performed by: CHIROPRACTOR

## 2018-09-05 RX ORDER — METHYLPHENIDATE HYDROCHLORIDE 20 MG/1
20 CAPSULE, EXTENDED RELEASE ORAL EVERY MORNING
Qty: 30 CAPSULE | Refills: 0 | Status: SHIPPED | OUTPATIENT
Start: 2018-09-05 | End: 2018-09-11

## 2018-09-05 NOTE — PROGRESS NOTES
Visit #:  4    Subjective:  Denver Brewer is a 23 year old male who is seen in f/u up for:        Somatic dysfunction of lumbar region  Chronic bilateral low back pain without sciatica  Cervical segment dysfunction  Cervicalgia  Intractable tension-type headache, unspecified chronicity pattern.     Since last visit,  Denver Brewer reports:    Area of chief complaint:  Cervical, Thoracic and Lumbar :  Symptoms are graded at 4/10. The quality is described as stiff, achey, dull.  Motion has increased, but is still not normal. The pt reports 60% improvement in the low back area and 20% in the neck. He denies sharp pain in the low back however feels moderate stiffness in the neck. The pt denies HA sx.     Since last visit the patient feels that they are 60 percent in the low back and 20% in the neck area improved from last visit.       Objective:  The following was observed:    P: palpatory tendernessLevator scapulae, Quad lumb and Sub-occipital Bilaterally  A: static palpation demonstrates intersegmental asymmetry   R: motion palpation notes restricted motion  T: hypertonicity at: Levator scapulae, Lumbar erector spine and Sub-occipital Bilaterally    Segmental spinal dysfunction/restrictions found at:  C2, C3, T7, T9, L3, SACRUM, R PSIS .      Assessment:    Diagnoses:      1. Somatic dysfunction of lumbar region    2. Chronic bilateral low back pain without sciatica    3. Cervical segment dysfunction    4. Cervicalgia    5. Intractable tension-type headache, unspecified chronicity pattern        Patient's condition:  Patient responding well to therapy     Treatment effectiveness:  Post manipulation there is better intersegmental movement and Patient claims to feel looser post manipulation      Procedures:  CMT:  90932 Chiropractic manipulative treatment 3-4 regions performed   Cervical: Diversified, C2, C7 , Prone, Supine  Thoracic: Diversified, T1, T6, T9, Prone  Lumbar: Diversified,L4,  L5, Side posture  Pelvis:  Diversified, Sacrum , PSIS Right , Prone    Modalities:  ACP: Sushma points of the cervical spine and lumbar spine-radha points of the upper shoulders   15 minutes prone    Therapeutic procedures:  None    Response to Treatment  Reduction in symptoms as reported by patient    Prognosis: Good    Progress towards Goals: Patient is making progress towards the goal.Goals:  Turning neck in either direction  Bending the neck         Recommendations:    Instructions:continue to use the lumbar support     Follow-up:  Return to care in 1 week with US therapy.   ACP NV

## 2018-09-05 NOTE — TELEPHONE ENCOUNTER
Consent given last time, not returned  Script/label placed at the  for pickup with picture ID  Ronda Abiola JAEGER

## 2018-09-07 ENCOUNTER — THERAPY VISIT (OUTPATIENT)
Dept: CHIROPRACTIC MEDICINE | Facility: CLINIC | Age: 23
End: 2018-09-07
Payer: COMMERCIAL

## 2018-09-07 DIAGNOSIS — M54.2 CERVICALGIA: ICD-10-CM

## 2018-09-07 DIAGNOSIS — G89.29 CHRONIC BILATERAL LOW BACK PAIN WITHOUT SCIATICA: ICD-10-CM

## 2018-09-07 DIAGNOSIS — M54.50 CHRONIC BILATERAL LOW BACK PAIN WITHOUT SCIATICA: ICD-10-CM

## 2018-09-07 DIAGNOSIS — G44.201 INTRACTABLE TENSION-TYPE HEADACHE, UNSPECIFIED CHRONICITY PATTERN: ICD-10-CM

## 2018-09-07 DIAGNOSIS — M99.01 CERVICAL SEGMENT DYSFUNCTION: ICD-10-CM

## 2018-09-07 DIAGNOSIS — M99.03 SOMATIC DYSFUNCTION OF LUMBAR REGION: Primary | ICD-10-CM

## 2018-09-07 PROCEDURE — 98941 CHIROPRACT MANJ 3-4 REGIONS: CPT | Mod: AT | Performed by: CHIROPRACTOR

## 2018-09-07 PROCEDURE — 97810 ACUP 1/> WO ESTIM 1ST 15 MIN: CPT | Mod: GA | Performed by: CHIROPRACTOR

## 2018-09-07 NOTE — PROGRESS NOTES
Visit #:  5    Subjective:  Denver Brewer is a 23 year old male who is seen in f/u up for:        Somatic dysfunction of lumbar region  Chronic bilateral low back pain without sciatica  Cervical segment dysfunction  Cervicalgia  Intractable tension-type headache, unspecified chronicity pattern.     Since last visit,  Denver Brewer reports:    Area of chief complaint:  Cervical, Thoracic and Lumbar :  Symptoms are graded at 3/10. The quality is described as stiff, achey, dull.  Motion has increased, but is still not normal. The pt reports 70% improvement since initial presentation. He is pleased with his progress. The pt denies pain in the low back area. He is addressing his overall posture. He continues to reports L sided neck pain that is sharp at times however much less often. He denies weakness or other unusual sx.     Since last visit the patient feels that they are 70 percent in the low back and  in the neck area improved from last visit.       Objective:  The following was observed:    P: palpatory tendernessLevator scapulae, Quad lumb and Sub-occipital Bilaterally  A: static palpation demonstrates intersegmental asymmetry   R: motion palpation notes restricted motion  T: hypertonicity at: Levator scapulae, Lumbar erector spine and Sub-occipital Bilaterally    Segmental spinal dysfunction/restrictions found at:  C2, C3, T7, T9, L3, SACRUM, R PSIS .      Assessment:    Diagnoses:      1. Somatic dysfunction of lumbar region    2. Chronic bilateral low back pain without sciatica    3. Cervical segment dysfunction    4. Cervicalgia    5. Intractable tension-type headache, unspecified chronicity pattern        Patient's condition:  Patient responding well to therapy     Treatment effectiveness:  Post manipulation there is better intersegmental movement and Patient claims to feel looser post manipulation      Procedures:  CMT:  51563 Chiropractic manipulative treatment 3-4 regions performed   Cervical:  Diversified, C2, C7 , Prone, Supine  Thoracic: Diversified, T1, T6, T9, Prone  Lumbar: Diversified,L4,  L5, Side posture  Pelvis: Diversified, Sacrum , PSIS Right , Prone    Modalities:  ACP: Sushma points of the cervical spine and lumbar spine-radha points of the upper shoulders and hips. Points in the legs   15 minutes prone    Therapeutic procedures:  None    Response to Treatment  Reduction in symptoms as reported by patient    Prognosis: Good    Progress towards Goals: Patient is making progress towards the goal.Goals:  Turning neck in either direction  Bending the neck         Recommendations:    Instructions:continue to use the lumbar support     Follow-up:  Return to care in 1 week with ACP

## 2018-09-11 ENCOUNTER — OFFICE VISIT (OUTPATIENT)
Dept: FAMILY MEDICINE | Facility: CLINIC | Age: 23
End: 2018-09-11
Payer: COMMERCIAL

## 2018-09-11 VITALS
HEART RATE: 80 BPM | WEIGHT: 154 LBS | TEMPERATURE: 97.4 F | SYSTOLIC BLOOD PRESSURE: 138 MMHG | HEIGHT: 66 IN | BODY MASS INDEX: 24.75 KG/M2 | DIASTOLIC BLOOD PRESSURE: 70 MMHG

## 2018-09-11 DIAGNOSIS — Z23 NEED FOR PROPHYLACTIC VACCINATION AND INOCULATION AGAINST INFLUENZA: ICD-10-CM

## 2018-09-11 DIAGNOSIS — F90.9 ATTENTION DEFICIT HYPERACTIVITY DISORDER (ADHD), UNSPECIFIED ADHD TYPE: Primary | ICD-10-CM

## 2018-09-11 PROCEDURE — 99213 OFFICE O/P EST LOW 20 MIN: CPT | Mod: 25 | Performed by: FAMILY MEDICINE

## 2018-09-11 PROCEDURE — 90686 IIV4 VACC NO PRSV 0.5 ML IM: CPT | Performed by: FAMILY MEDICINE

## 2018-09-11 PROCEDURE — 90471 IMMUNIZATION ADMIN: CPT | Performed by: FAMILY MEDICINE

## 2018-09-11 RX ORDER — METHYLPHENIDATE HYDROCHLORIDE 20 MG/1
20 TABLET ORAL 2 TIMES DAILY PRN
Qty: 60 TABLET | Refills: 0 | Status: SHIPPED | OUTPATIENT
Start: 2018-09-11 | End: 2018-10-23

## 2018-09-11 ASSESSMENT — ANXIETY QUESTIONNAIRES
3. WORRYING TOO MUCH ABOUT DIFFERENT THINGS: SEVERAL DAYS
GAD7 TOTAL SCORE: 4
6. BECOMING EASILY ANNOYED OR IRRITABLE: SEVERAL DAYS
1. FEELING NERVOUS, ANXIOUS, OR ON EDGE: NOT AT ALL
5. BEING SO RESTLESS THAT IT IS HARD TO SIT STILL: NOT AT ALL
2. NOT BEING ABLE TO STOP OR CONTROL WORRYING: NOT AT ALL
7. FEELING AFRAID AS IF SOMETHING AWFUL MIGHT HAPPEN: SEVERAL DAYS
IF YOU CHECKED OFF ANY PROBLEMS ON THIS QUESTIONNAIRE, HOW DIFFICULT HAVE THESE PROBLEMS MADE IT FOR YOU TO DO YOUR WORK, TAKE CARE OF THINGS AT HOME, OR GET ALONG WITH OTHER PEOPLE: NOT DIFFICULT AT ALL

## 2018-09-11 ASSESSMENT — PATIENT HEALTH QUESTIONNAIRE - PHQ9: 5. POOR APPETITE OR OVEREATING: SEVERAL DAYS

## 2018-09-11 NOTE — PROGRESS NOTES
"  SUBJECTIVE:   Denver Brewer is a 23 year old male who presents to clinic today for the following health issues:      Medication Followup of Methylphenidate    Taking Medication as prescribed: yes    Side Effects:  None    Medication Helping Symptoms:  Yes but would like to switch rx.  The extended release medication is very expensive.  Would like to get the immediate release medication and use it twice a day           Problem list and histories reviewed & adjusted, as indicated.  Additional history: as documented    Patient Active Problem List   Diagnosis     Attention deficit hyperactivity disorder (ADHD), unspecified ADHD type     Anxiety     Somatic dysfunction of lumbar region     Chronic bilateral low back pain without sciatica     Cervical segment dysfunction     Cervicalgia     Cephalgia     History reviewed. No pertinent surgical history.    Social History   Substance Use Topics     Smoking status: Never Smoker     Smokeless tobacco: Never Used     Alcohol use Yes      Comment: rare     Family History   Problem Relation Age of Onset     Family History Negative Other          Current Outpatient Prescriptions   Medication Sig Dispense Refill     methylphenidate (RITALIN) 20 MG tablet Take 1 tablet (20 mg) by mouth 2 times daily as needed 60 tablet 0     Allergies   Allergen Reactions     Cats        Reviewed and updated as needed this visit by clinical staff  Tobacco  Allergies  Meds  Med Hx  Surg Hx  Fam Hx  Soc Hx      Reviewed and updated as needed this visit by Provider         ROS:  CONSTITUTIONAL: NEGATIVE for fever, chills, change in weight  ENT/MOUTH: NEGATIVE for ear, mouth and throat problems  RESP: NEGATIVE for significant cough or SOB  CV: NEGATIVE for chest pain, palpitations or peripheral edema    OBJECTIVE:                                                    /70  Pulse 80  Temp 97.4  F (36.3  C) (Tympanic)  Ht 5' 6\" (1.676 m)  Wt 154 lb (69.9 kg)  BMI 24.86 kg/m2  Body mass " index is 24.86 kg/(m^2).   GENERAL: healthy, alert, well nourished, well hydrated, no distress  RESP: lungs clear to auscultation - no rales, no rhonchi, no wheezes  CV: regular rates and rhythm, normal S1 S2, no S3 or S4 and no murmur, no click or rub -  PSYCH: Alert and oriented times 3; speech- coherent , normal rate and volume; able to articulate logical thoughts, able to abstract reason, no tangential thoughts, no hallucinations or delusions, affect- normal         ASSESSMENT/PLAN:                                                        ICD-10-CM    1. Attention deficit hyperactivity disorder (ADHD), unspecified ADHD type F90.9 methylphenidate (RITALIN) 20 MG tablet   2. Need for prophylactic vaccination and inoculation against influenza Z23 FLU VACCINE, SPLIT VIRUS, IM (QUADRIVALENT) [33310]- >3 YRS     Vaccine Administration, Initial [17535]     Switching the patient to Ritalin 20 mg twice daily for attention deficit disorder.  Recommended to notify me back if symptoms are not well managed with it.        Jacob Ambrose MD  [facility]    Injectable Influenza Immunization Documentation    1.  Is the person to be vaccinated sick today?   No    2. Does the person to be vaccinated have an allergy to a component   of the vaccine?   No  Egg Allergy Algorithm Link    3. Has the person to be vaccinated ever had a serious reaction   to influenza vaccine in the past?   No    4. Has the person to be vaccinated ever had Guillain-Barré syndrome?   No    Form completed by Maday Hoffmann CMA

## 2018-09-11 NOTE — MR AVS SNAPSHOT
After Visit Summary   9/11/2018    Denver Brewer    MRN: [id number]           Patient Information     Date Of Birth          1995        Visit Information        Provider Department      9/11/2018 10:40 AM Jacob Ambrose MD [facility] [place]        Today's Diagnoses     Attention deficit hyperactivity disorder (ADHD), unspecified ADHD type    -  1       Follow-ups after your visit        Follow-up notes from your care team     Return in about 6 months (around 3/11/2019) for Mood Recheck.      Your next 10 appointments already scheduled     Sep 14, 2018 10:30 AM CDT   Chiro Acupuncture Follow Up with Brunilda Carlson DC   [facility] [place] [facility] ([facility] [place])    [address] [place] [address]  [address]  [place] [address] [phone]              Who to contact     If you have questions or need follow up information about today's clinic visit or your schedule please contact [facility] [place] directly at [phone].  Normal or non-critical lab and imaging results will be communicated to you by [employer]hart, letter or phone within 4 business days after the clinic has received the results. If you do not hear from us within 7 days, please contact the clinic through [employer] or phone. If you have a critical or abnormal lab result, we will notify you by phone as soon as possible.  Submit refill requests through [employer] or call your pharmacy and they will forward the refill request to us. Please allow 3 business days for your refill to be completed.          Additional Information About Your Visit        MyChart Information     [employer] gives you secure access to your electronic health record. If you see a primary care provider, you can also send messages to your care team and make appointments. If you have questions, please call your primary care clinic.  If you do not have a primary care provider, please call [phone] and they will  "assist you.        Care EveryWhere ID     This is your Care EveryWhere ID. This could be used by other organizations to access your Outlook medical records  [id number]        Your Vitals Were     Pulse Temperature Height BMI (Body Mass Index)          80 97.4  F (36.3  C) (Tympanic) 5' 6\" (1.676 m) 24.86 kg/m2         Blood Pressure from Last 3 Encounters:   09/11/18 138/70   08/08/18 123/78   04/26/18 126/80    Weight from Last 3 Encounters:   09/11/18 154 lb (69.9 kg)   08/08/18 162 lb (73.5 kg)   03/01/18 156 lb 12.8 oz (71.1 kg)              Today, you had the following     No orders found for display         Today's Medication Changes          These changes are accurate as of 9/11/18 11:07 AM.  If you have any questions, ask your nurse or doctor.               Start taking these medicines.        Dose/Directions    methylphenidate 20 MG tablet   Commonly known as:  RITALIN   Used for:  Attention deficit hyperactivity disorder (ADHD), unspecified ADHD type   Replaces:  methylphenidate 20 MG CR capsule   Started by:  Jacob Ambrose MD        Dose:  20 mg   Take 1 tablet (20 mg) by mouth 2 times daily as needed   Quantity:  60 tablet   Refills:  0         Stop taking these medicines if you haven't already. Please contact your care team if you have questions.     methylphenidate 20 MG CR capsule   Commonly known as:  METADATE CD   Replaced by:  methylphenidate 20 MG tablet   Stopped by:  Jacob Ambrose MD                Where to get your medicines      Some of these will need a paper prescription and others can be bought over the counter.  Ask your nurse if you have questions.     Bring a paper prescription for each of these medications     methylphenidate 20 MG tablet                Primary Care Provider Office Phone # Fax #    Jacob Ambrose [phone]       [address] [facility] [place] [address]        Equal Access to Services     ALEXANDER DAVIS AH: catarina Cates " nadine shawgrayusha varelacassie gagnon ah. So [facility] [phone].    ATENCIÓN: Si jovany levine, tiene a romero disposición servicios gratuitos de asistencia lingüística. Llame al [phone].    We comply with applicable federal civil rights laws and [place] laws. We do not discriminate on the basis of race, color, national origin, age, disability, sex, sexual orientation, or gender identity.            Thank you!     [facility]. Our goal is always to provide you with excellent care. Hearing back from our patients is one way we can continue to improve our services. Please take a few minutes to complete the written survey that you may receive in the mail after your visit with us. Thank you!             Your Updated Medication List - Protect others around you: Learn how to safely use, store and throw away your medicines at www.disposemymeds.org.          This list is accurate as of 9/11/18 11:07 AM.  Always use your most recent med list.                   Brand Name Dispense Instructions for use Diagnosis    methylphenidate 20 MG tablet    RITALIN    60 tablet    Take 1 tablet (20 mg) by mouth 2 times daily as needed    Attention deficit hyperactivity disorder (ADHD), unspecified ADHD type

## 2018-09-12 ASSESSMENT — ANXIETY QUESTIONNAIRES: GAD7 TOTAL SCORE: 4

## 2018-09-12 ASSESSMENT — PATIENT HEALTH QUESTIONNAIRE - PHQ9: SUM OF ALL RESPONSES TO PHQ QUESTIONS 1-9: 7

## 2018-09-14 ENCOUNTER — THERAPY VISIT (OUTPATIENT)
Dept: CHIROPRACTIC MEDICINE | Facility: CLINIC | Age: 23
End: 2018-09-14
Payer: COMMERCIAL

## 2018-09-14 DIAGNOSIS — M99.03 SOMATIC DYSFUNCTION OF LUMBAR REGION: Primary | ICD-10-CM

## 2018-09-14 DIAGNOSIS — M54.2 CERVICALGIA: ICD-10-CM

## 2018-09-14 DIAGNOSIS — M99.01 CERVICAL SEGMENT DYSFUNCTION: ICD-10-CM

## 2018-09-14 DIAGNOSIS — M54.50 CHRONIC BILATERAL LOW BACK PAIN WITHOUT SCIATICA: ICD-10-CM

## 2018-09-14 DIAGNOSIS — G44.201 INTRACTABLE TENSION-TYPE HEADACHE, UNSPECIFIED CHRONICITY PATTERN: ICD-10-CM

## 2018-09-14 DIAGNOSIS — G89.29 CHRONIC BILATERAL LOW BACK PAIN WITHOUT SCIATICA: ICD-10-CM

## 2018-09-14 PROCEDURE — 98941 CHIROPRACT MANJ 3-4 REGIONS: CPT | Mod: AT | Performed by: CHIROPRACTOR

## 2018-09-14 PROCEDURE — 97810 ACUP 1/> WO ESTIM 1ST 15 MIN: CPT | Performed by: CHIROPRACTOR

## 2018-09-14 PROCEDURE — 97035 APP MDLTY 1+ULTRASOUND EA 15: CPT | Performed by: CHIROPRACTOR

## 2018-09-14 NOTE — PROGRESS NOTES
Visit #:  6    Subjective:  Denver Brewer is a 23 year old male who is seen in f/u up for:        Somatic dysfunction of lumbar region  Chronic bilateral low back pain without sciatica  Cervical segment dysfunction  Cervicalgia  Intractable tension-type headache, unspecified chronicity pattern.     Since last visit,  Denver Brewer reports:    Area of chief complaint:  Cervical, Thoracic and Lumbar :  Symptoms are graded at 1-2/10. The quality is described as stiff, achey, dull.  Motion has increased, but is still not normal. The pt reports 80% to 90% improvement since initial presentation. He is pleased with his progress. The pt reports tension across  the low back area intermittently. He states she he sits incorrectly he will feel the tension in the low back. He is addressing his overall posture. He states the L neck pain is mild and he is able to control it with stretching    Since last visit the patient feels that they are 80-90 percent in the low back and  in the neck area improved from last visit.       Objective:  The following was observed:    P: palpatory tendernessLevator scapulae, Quad lumb and Sub-occipital Bilaterally  A: static palpation demonstrates intersegmental asymmetry   R: motion palpation notes restricted motion  T: hypertonicity at: Levator scapulae, Lumbar erector spine and Sub-occipital Bilaterally    Segmental spinal dysfunction/restrictions found at:  C2, C3, T7, T9, L3, SACRUM, R PSIS .      Assessment:    Diagnoses:      1. Somatic dysfunction of lumbar region    2. Chronic bilateral low back pain without sciatica    3. Cervical segment dysfunction    4. Cervicalgia    5. Intractable tension-type headache, unspecified chronicity pattern        Patient's condition:  Patient responding well to therapy     Treatment effectiveness:  Post manipulation there is better intersegmental movement and Patient claims to feel looser post manipulation      Procedures:  CMT:  31076 Chiropractic  manipulative treatment 3-4 regions performed   Cervical: Diversified, C2, C7 , Prone, Supine  Thoracic: Diversified, T1, T6, T9, Prone  Lumbar: Diversified,L4,  L5, Side posture  Pelvis: Diversified, Sacrum , PSIS Right , Prone    Modalities:  ACP: Kinatlamar points of the cervical spine -radha points of the upper shoulders -NO POINTS TO LOW BACK  15 minutes prone    US Therapy: 1.8 guzman/cm2 to the lumbar musculature @ 1 MHZ  Pt is prone            Therapeutic procedures:    Response to Treatment  Reduction in symptoms as reported by patient    Prognosis: Good    Progress towards Goals: Patient has reached all goals   .Goals:  Turning neck in either direction  Bending the neck         Recommendations:    Instructions:continue to use the lumbar support     Follow-up:  Return to care none: the pt is released PRN  He is moving to [place]

## 2018-10-23 ENCOUNTER — MYC REFILL (OUTPATIENT)
Dept: FAMILY MEDICINE | Facility: CLINIC | Age: 23
End: 2018-10-23

## 2018-10-23 DIAGNOSIS — F90.9 ATTENTION DEFICIT HYPERACTIVITY DISORDER (ADHD), UNSPECIFIED ADHD TYPE: ICD-10-CM

## 2018-10-23 NOTE — TELEPHONE ENCOUNTER
Message from [employer]:  Original authorizing provider: MD Denver Landin RIA Brewer would like a refill of the following medications:  methylphenidate (RITALIN) 20 MG tablet [Jacob Ambrose MD]    Preferred pharmacy: [facility] [id number] [place] [address] [address] AT [facility] OF [address]    Comment:

## 2018-10-23 NOTE — TELEPHONE ENCOUNTER
Methylphenidate 20 mg      Last Written Prescription Date:  9/11/18  Last Fill Quantity: 60,   # refills: 0  Last Office Visit: 9/11/18 Arnol  Future Office visit:       Routing refill request to provider for review/approval because:  Drug not on the [facility], UMP or  Health refill protocol or controlled substance    RX monitoring program ([place]) reviewed:  not reviewed/not due - last done on 7/17/18    [place] profile:  [email or web address]    Marizol PEREZ RN  EP Triage

## 2018-10-24 RX ORDER — METHYLPHENIDATE HYDROCHLORIDE 20 MG/1
20 TABLET ORAL 2 TIMES DAILY PRN
Qty: 60 TABLET | Refills: 0 | Status: SHIPPED | OUTPATIENT
Start: 2018-10-24 | End: 2019-01-21

## 2018-10-24 NOTE — TELEPHONE ENCOUNTER
Script /label placed at the  for pickup with picture ID  Patient informed via Wave[employer]hart  Ronda JAEGER

## 2019-01-21 ENCOUNTER — MYC REFILL (OUTPATIENT)
Dept: FAMILY MEDICINE | Facility: CLINIC | Age: 24
End: 2019-01-21

## 2019-01-21 DIAGNOSIS — F90.9 ATTENTION DEFICIT HYPERACTIVITY DISORDER (ADHD), UNSPECIFIED ADHD TYPE: ICD-10-CM

## 2019-01-21 RX ORDER — METHYLPHENIDATE HYDROCHLORIDE 20 MG/1
20 TABLET ORAL 2 TIMES DAILY PRN
Qty: 60 TABLET | Refills: 0 | Status: SHIPPED | OUTPATIENT
Start: 2019-01-21 | End: 2019-04-18

## 2019-01-21 NOTE — TELEPHONE ENCOUNTER
Prescription for Ritalin signed by Dr. Ambrose and walked to the  for . Patient advised via telephone call.      .Maryanne WAYNE    [facility] [place]

## 2019-01-21 NOTE — TELEPHONE ENCOUNTER
Requested Prescriptions   Pending Prescriptions Disp Refills     methylphenidate (RITALIN) 20 MG tablet 60 tablet 0     Sig: Take 1 tablet (20 mg) by mouth 2 times daily as needed (for lack of concentration)    There is no refill protocol information for this order        methylphenidate (RITALIN) 20 MG tablet 60 tablet 0 10/24/2018       Last Written Prescription Date:  10/24/2018  Last Fill Quantity: 60,  # refills: 0   Last office visit: 9/11/2018 with prescribing provider:  Dr. Ambrose   Future Office Visit:  Unknown

## 2019-01-22 NOTE — TELEPHONE ENCOUNTER
RX picked up by patient on not written.    .Maryanne WAYNE    [facility] [place]

## 2019-04-18 ENCOUNTER — MYC REFILL (OUTPATIENT)
Dept: FAMILY MEDICINE | Facility: CLINIC | Age: 24
End: 2019-04-18

## 2019-04-18 DIAGNOSIS — F90.9 ATTENTION DEFICIT HYPERACTIVITY DISORDER (ADHD), UNSPECIFIED ADHD TYPE: ICD-10-CM

## 2019-04-19 NOTE — TELEPHONE ENCOUNTER
Controlled Substance Refill Request for methylphenidate  Problem List Complete:  No     PROVIDER TO CONSIDER COMPLETION OF PROBLEM LIST AND OVERVIEW/CONTROLLED SUBSTANCE AGREEMENT    Last Written Prescription Date:  1/21/19  Last Fill Quantity: 60,   # refills: 0    THE MOST RECENT OFFICE VISIT MUST BE WITHIN THE PAST 3 MONTHS. AT LEAST ONE FACE TO FACE VISIT MUST OCCUR EVERY 6 MONTHS. ADDITIONAL VISITS CAN BE VIRTUAL.  (THIS STATEMENT SHOULD BE DELETED.)    Last Office Visit with [facility] primary care provider: 9/11/18    Future Office visit:     Controlled substance agreement:   Encounter-Level CSA:    There are no encounter-level csa.     Patient-Level CSA:    There are no patient-level csa.         Last Urine Drug Screen: No results found for: CDAUT, No results found for: COMDAT, No results found for: THC13, PCP13, COC13, MAMP13, OPI13, AMP13, BZO13, TCA13, MTD13, BAR13, OXY13, PPX13, BUP13     Processing:  Patient will  in clinic     [email or web address]       checked in past 3 months?  No, route to RN     [facility] site not responding.   Routing refill request to provider for review/approval because:  A break in medication. He states that he does not take all the time. States takes 1/2 dose 5 times a week when working.  Patient scheduled follow up appt for 4/23.  Sherie Cancino RN

## 2019-04-22 RX ORDER — METHYLPHENIDATE HYDROCHLORIDE 20 MG/1
20 TABLET ORAL 2 TIMES DAILY PRN
Qty: 60 TABLET | Refills: 0 | Status: SHIPPED | OUTPATIENT
Start: 2019-04-22 | End: 2019-09-25

## 2019-04-22 NOTE — TELEPHONE ENCOUNTER
RX signed by pcp and brought to the  for patient to . Patient called and notified.      .Maryanne WAYNE    [facility] [place]

## 2019-09-25 ENCOUNTER — OFFICE VISIT (OUTPATIENT)
Dept: FAMILY MEDICINE | Facility: CLINIC | Age: 24
End: 2019-09-25
Payer: COMMERCIAL

## 2019-09-25 VITALS
BODY MASS INDEX: 25.07 KG/M2 | SYSTOLIC BLOOD PRESSURE: 110 MMHG | TEMPERATURE: 97.7 F | OXYGEN SATURATION: 97 % | WEIGHT: 156 LBS | DIASTOLIC BLOOD PRESSURE: 80 MMHG | HEIGHT: 66 IN | HEART RATE: 72 BPM

## 2019-09-25 DIAGNOSIS — Z23 NEED FOR PROPHYLACTIC VACCINATION AND INOCULATION AGAINST INFLUENZA: Primary | ICD-10-CM

## 2019-09-25 DIAGNOSIS — Z79.899 HIGH RISK MEDICATION USE: ICD-10-CM

## 2019-09-25 DIAGNOSIS — F90.9 ATTENTION DEFICIT HYPERACTIVITY DISORDER (ADHD), UNSPECIFIED ADHD TYPE: ICD-10-CM

## 2019-09-25 PROCEDURE — 40000358 ZZHCL STATISTIC DRUG SCREEN MULTIPLE (METRO): Performed by: FAMILY MEDICINE

## 2019-09-25 PROCEDURE — 80307 DRUG TEST PRSMV CHEM ANLYZR: CPT | Performed by: FAMILY MEDICINE

## 2019-09-25 PROCEDURE — 90471 IMMUNIZATION ADMIN: CPT | Performed by: FAMILY MEDICINE

## 2019-09-25 PROCEDURE — 90686 IIV4 VACC NO PRSV 0.5 ML IM: CPT | Performed by: FAMILY MEDICINE

## 2019-09-25 PROCEDURE — 99214 OFFICE O/P EST MOD 30 MIN: CPT | Mod: 25 | Performed by: FAMILY MEDICINE

## 2019-09-25 RX ORDER — METHYLPHENIDATE HYDROCHLORIDE 20 MG/1
20 TABLET ORAL 2 TIMES DAILY PRN
Qty: 60 TABLET | Refills: 0 | Status: SHIPPED | OUTPATIENT
Start: 2019-09-25 | End: 2020-06-01

## 2019-09-25 ASSESSMENT — PATIENT HEALTH QUESTIONNAIRE - PHQ9
SUM OF ALL RESPONSES TO PHQ QUESTIONS 1-9: 11
5. POOR APPETITE OR OVEREATING: SEVERAL DAYS

## 2019-09-25 ASSESSMENT — ANXIETY QUESTIONNAIRES
GAD7 TOTAL SCORE: 11
3. WORRYING TOO MUCH ABOUT DIFFERENT THINGS: MORE THAN HALF THE DAYS
IF YOU CHECKED OFF ANY PROBLEMS ON THIS QUESTIONNAIRE, HOW DIFFICULT HAVE THESE PROBLEMS MADE IT FOR YOU TO DO YOUR WORK, TAKE CARE OF THINGS AT HOME, OR GET ALONG WITH OTHER PEOPLE: SOMEWHAT DIFFICULT
5. BEING SO RESTLESS THAT IT IS HARD TO SIT STILL: NOT AT ALL
7. FEELING AFRAID AS IF SOMETHING AWFUL MIGHT HAPPEN: MORE THAN HALF THE DAYS
6. BECOMING EASILY ANNOYED OR IRRITABLE: MORE THAN HALF THE DAYS
1. FEELING NERVOUS, ANXIOUS, OR ON EDGE: MORE THAN HALF THE DAYS
2. NOT BEING ABLE TO STOP OR CONTROL WORRYING: MORE THAN HALF THE DAYS

## 2019-09-25 ASSESSMENT — MIFFLIN-ST. JEOR: SCORE: 1635.36

## 2019-09-25 NOTE — PROGRESS NOTES
"Subjective     Deock Steffanie Brewer is a 24 year old male who presents to clinic today for the following health issues:    HPI   Medication Followup of ritalin    Taking Medication as prescribed: states he has been on a couple vacations and does not take while not at work    Side Effects: none    Medication Helping Symptoms:  Yes helps with focus and motivation   Denies any side effects.  Overall stable.  Patient last picked prescription in April of this year.  He used only when he goes to work.  Denies any chest pain shortness of breath.  He usually use once daily but sometimes if he is working more hours takes twice daily.          Reviewed and updated as needed this visit by Provider         Review of Systems   ROS COMP: Constitutional, HEENT, cardiovascular, pulmonary, gi and gu systems are negative, except as otherwise noted.      Objective    /80   Pulse 72   Temp 97.7  F (36.5  C) (Tympanic)   Ht 1.676 m (5' 6\")   Wt 70.8 kg (156 lb)   SpO2 97%   BMI 25.18 kg/m    Body mass index is 25.18 kg/m .  Physical Exam   GENERAL: healthy, alert and no distress  NECK: no adenopathy, no asymmetry, masses, or scars and thyroid normal to palpation  RESP: lungs clear to auscultation - no rales, rhonchi or wheezes  CV: regular rate and rhythm, normal S1 S2, no S3 or S4, no murmur, click or rub, no peripheral edema and peripheral pulses strong  ABDOMEN: soft, nontender, no hepatosplenomegaly, no masses and bowel sounds normal  MS: no gross musculoskeletal defects noted, no edema    Diagnostic Test Results:  Labs reviewed in Epic        Assessment & Plan     1. Need for prophylactic vaccination and inoculation against influenza    - INFLUENZA VACCINE IM > 6 MONTHS VALENT IIV4 [95131]  - Vaccine Administration, Initial [85712]    2. Attention deficit hyperactivity disorder (ADHD), unspecified ADHD type  Medication refill.  Advised to take it only as prescribed.  Denies any side effects.  Warning signs including no " "appetite and palpitations discussed with the patient.  - methylphenidate (RITALIN) 20 MG tablet; Take 1 tablet (20 mg) by mouth 2 times daily as needed (for lack of concentration)  Dispense: 60 tablet; Refill: 0    3. High risk medication use  We will do routine urine drug screen as part of high risk medication use.  He denies any concerns no red flags.  - Drug screen urine     BMI:   Estimated body mass index is 25.18 kg/m  as calculated from the following:    Height as of this encounter: 1.676 m (5' 6\").    Weight as of this encounter: 70.8 kg (156 lb).     Griffin Bashir MD  [facility]      "

## 2019-09-26 LAB
ACETAMINOPHEN QUAL: NEGATIVE
AMANTADINE: NEGATIVE
AMITRIPTYLINE QUAL: NEGATIVE
AMOXAPINE: NEGATIVE
AMPHETAMINES QUAL: NEGATIVE
ATROPINE: NEGATIVE
BENZODIAZ UR QL: NEGATIVE
BUPROPION QUAL: NEGATIVE
CAFFEINE QUAL: POSITIVE
CANNABINOIDS UR QL SCN: NEGATIVE
CARBAMAZEPINE QUAL: NEGATIVE
CHLORPHENIRAMINE: NEGATIVE
CHLORPROMAZINE: NEGATIVE
CITALOPRAM QUAL: NEGATIVE
CLOMIPRAMINE QUAL: NEGATIVE
COCAINE QUAL: NEGATIVE
COCAINE UR QL: NEGATIVE
CODEINE QUAL: NEGATIVE
DESIPRAMINE QUAL: NEGATIVE
DEXTROMETHORPHAN: NEGATIVE
DIPHENHYDRAMINE: NEGATIVE
DOXEPIN/METABOLITE: NEGATIVE
DOXYLAMINE: NEGATIVE
EPHEDRINE OR PSEUDO: NEGATIVE
FENTANYL QUAL: NEGATIVE
FLUOXETINE AND METAB: NEGATIVE
HYDROCODONE QUAL: NEGATIVE
HYDROMORPHONE QUAL: NEGATIVE
IBUPROFEN QUAL: NEGATIVE
IMIPRAMINE QUAL: NEGATIVE
KETAMINE QUAL: NEGATIVE
LAMOTRIGINE QUAL: NEGATIVE
LIDOCAIN SPEC QL: NEGATIVE
LOXAPINE: NEGATIVE
MAPROTYLINE: NEGATIVE
MDMA QUAL: NEGATIVE
MEPERIDINE QUAL: NEGATIVE
METHAMPHETAMINE: NEGATIVE
METHODONE QUAL: NEGATIVE
MIRTAZAPINE QUAL: NEGATIVE
MORPHINE QUAL: NEGATIVE
NICOTINE: NEGATIVE
NORTRIPTYLINE QUAL: NEGATIVE
OLANZAPINE QUAL: NEGATIVE
OPIATES UR QL SCN: NEGATIVE
OXYCODONE QUAL: NEGATIVE
PENTAZOCINE: NEGATIVE
PHENCYCLIDINE QUAL: NEGATIVE
PHENTERMINE: NEGATIVE
PROPOFOL QUAL: NEGATIVE
PROPOXPHENE QUAL: NEGATIVE
PROPRANOLOL QUAL: NEGATIVE
PYRILAMINE: NEGATIVE
QUETIAPINE METAB QUAL: NEGATIVE
SALICYLATE QUAL: NEGATIVE
SERTRALINE QUAL: NEGATIVE
THEOBROMINE: POSITIVE
TOPIRAMATE QUAL: NEGATIVE
TRAMADOL QUAL: NEGATIVE
TRIMIPRAMINE QUAL: NEGATIVE
VENLAFAXINE QUAL: NEGATIVE

## 2019-09-26 ASSESSMENT — ANXIETY QUESTIONNAIRES: GAD7 TOTAL SCORE: 11

## 2019-11-22 ENCOUNTER — TRANSFERRED RECORDS (OUTPATIENT)
Dept: HEALTH INFORMATION MANAGEMENT | Facility: CLINIC | Age: 24
End: 2019-11-22

## 2020-03-10 ENCOUNTER — HEALTH MAINTENANCE LETTER (OUTPATIENT)
Age: 25
End: 2020-03-10

## 2020-06-01 ENCOUNTER — MYC REFILL (OUTPATIENT)
Dept: FAMILY MEDICINE | Facility: CLINIC | Age: 25
End: 2020-06-01

## 2020-06-01 DIAGNOSIS — F90.9 ATTENTION DEFICIT HYPERACTIVITY DISORDER (ADHD), UNSPECIFIED ADHD TYPE: ICD-10-CM

## 2020-06-01 RX ORDER — METHYLPHENIDATE HYDROCHLORIDE 20 MG/1
20 TABLET ORAL 2 TIMES DAILY PRN
Qty: 60 TABLET | Refills: 0 | Status: SHIPPED | OUTPATIENT
Start: 2020-06-01 | End: 2021-03-18

## 2020-06-01 NOTE — TELEPHONE ENCOUNTER
Last Written Prescription Date:  9/25/19  PRN MEDICATION  Last Fill Quantity: 60,  # refills: 0   Last office visit: 9/25/2019 with prescribing provider:  Krysten  Future Office Visit:      Requested Prescriptions   Pending Prescriptions Disp Refills     methylphenidate (RITALIN) 20 MG tablet 60 tablet 0     Sig: Take 1 tablet (20 mg) by mouth 2 times daily as needed (for lack of concentration)       There is no refill protocol information for this order        Routing refill request to provider for review/approval because:  Drug not on the [facility] refill protocol       RX monitoring program ([place]) reviewed:  reviewed- no concerns    [place] profile:  [email or web address]    Alberta Bradshaw RN, BSN  [facility] [place]

## 2020-10-27 ENCOUNTER — OFFICE VISIT (OUTPATIENT)
Dept: FAMILY MEDICINE | Facility: CLINIC | Age: 25
End: 2020-10-27
Payer: COMMERCIAL

## 2020-10-27 VITALS
OXYGEN SATURATION: 97 % | BODY MASS INDEX: 26.84 KG/M2 | TEMPERATURE: 98 F | HEART RATE: 98 BPM | WEIGHT: 167 LBS | HEIGHT: 66 IN | SYSTOLIC BLOOD PRESSURE: 116 MMHG | DIASTOLIC BLOOD PRESSURE: 74 MMHG

## 2020-10-27 DIAGNOSIS — L30.9 ECZEMA, UNSPECIFIED TYPE: ICD-10-CM

## 2020-10-27 DIAGNOSIS — Z00.00 ANNUAL PHYSICAL EXAM: Primary | ICD-10-CM

## 2020-10-27 DIAGNOSIS — F39 MOOD DISORDER (H): ICD-10-CM

## 2020-10-27 PROCEDURE — 90471 IMMUNIZATION ADMIN: CPT | Performed by: FAMILY MEDICINE

## 2020-10-27 PROCEDURE — 99214 OFFICE O/P EST MOD 30 MIN: CPT | Mod: 25 | Performed by: FAMILY MEDICINE

## 2020-10-27 PROCEDURE — 90686 IIV4 VACC NO PRSV 0.5 ML IM: CPT | Performed by: FAMILY MEDICINE

## 2020-10-27 PROCEDURE — 99395 PREV VISIT EST AGE 18-39: CPT | Mod: 25 | Performed by: FAMILY MEDICINE

## 2020-10-27 RX ORDER — TRIAMCINOLONE ACETONIDE 1 MG/G
CREAM TOPICAL
Qty: 45 G | Refills: 0 | Status: SHIPPED | OUTPATIENT
Start: 2020-10-27 | End: 2020-10-27

## 2020-10-27 RX ORDER — TRIAMCINOLONE ACETONIDE 1 MG/G
CREAM TOPICAL
Qty: 45 G | Refills: 0 | Status: SHIPPED | OUTPATIENT
Start: 2020-10-27 | End: 2021-08-20

## 2020-10-27 ASSESSMENT — ANXIETY QUESTIONNAIRES
6. BECOMING EASILY ANNOYED OR IRRITABLE: SEVERAL DAYS
3. WORRYING TOO MUCH ABOUT DIFFERENT THINGS: SEVERAL DAYS
IF YOU CHECKED OFF ANY PROBLEMS ON THIS QUESTIONNAIRE, HOW DIFFICULT HAVE THESE PROBLEMS MADE IT FOR YOU TO DO YOUR WORK, TAKE CARE OF THINGS AT HOME, OR GET ALONG WITH OTHER PEOPLE: SOMEWHAT DIFFICULT
GAD7 TOTAL SCORE: 5
5. BEING SO RESTLESS THAT IT IS HARD TO SIT STILL: NOT AT ALL
1. FEELING NERVOUS, ANXIOUS, OR ON EDGE: MORE THAN HALF THE DAYS
2. NOT BEING ABLE TO STOP OR CONTROL WORRYING: SEVERAL DAYS
7. FEELING AFRAID AS IF SOMETHING AWFUL MIGHT HAPPEN: NOT AT ALL

## 2020-10-27 ASSESSMENT — MIFFLIN-ST. JEOR: SCORE: 1680.26

## 2020-10-27 ASSESSMENT — PATIENT HEALTH QUESTIONNAIRE - PHQ9
5. POOR APPETITE OR OVEREATING: NOT AT ALL
SUM OF ALL RESPONSES TO PHQ QUESTIONS 1-9: 6

## 2020-10-27 NOTE — PROGRESS NOTES
SUBJECTIVE:   CC: Denver Brewer is an 25 year old male who presents for preventative health visit.     Patient has been advised of split billing requirements and indicates understanding: Yes  Healthy Habits:    Getting at least 3 servings of Calcium per day:  NO    Bi-annual eye exam:  NO    Dental care twice a year:  Yes    Sleep apnea or symptoms of sleep apnea:  None    Diet:  Regular (no restrictions)    Frequency of exercise:  None    Duration of exercise:  N/A    Taking medications regularly:  Not Applicable    Barriers to taking medications:  Not applicable    Medication side effects:  Not applicable    PHQ-2 Total Score:      Patient has elevated scores of PHQ-9 today.  Reports of anxiety off-and-on.  He tells that he lives alone.  He works from home.  He barely interacts with other people.  He gets anxious getting out of the home.  No medication being used.  For attention deficit disorder he uses Ritalin as needed.  He still has many pills left.  Does not want a refill.  Complains of a rash on his buttock .  It gets to be itchy.  Symptoms present for a few days.  He has had eczema in the past.  Steroid medication helped in the past.  He has not used anything currently.  Today's PHQ-2 Score:   PHQ-2 ( 1999 Pf[employer]) 10/27/2020   Q1: Little interest or pleasure in doing things 1   Q2: Feeling down, depressed or hopeless 1   PHQ-2 Score 2       Abuse: Current or Past(Physical, Sexual or Emotional)- No  Do you feel safe in your environment? Yes        Social History     Tobacco Use     Smoking status: Never Smoker     Smokeless tobacco: Never Used   Substance Use Topics     Alcohol use: Yes     Comment: rare     If you drink alcohol do you typically have >3 drinks per day or >7 drinks per week? No    No flowsheet data found.    Last PSA: No results found for: PSA    Reviewed orders with patient. Reviewed health maintenance and updated orders accordingly - Yes  Patient Active Problem List   Diagnosis      "Attention deficit hyperactivity disorder (ADHD), unspecified ADHD type     Anxiety     Somatic dysfunction of lumbar region     Chronic bilateral low back pain without sciatica     Cervical segment dysfunction     Cervicalgia     Cephalgia     No past surgical history on file.    Social History     Tobacco Use     Smoking status: Never Smoker     Smokeless tobacco: Never Used   Substance Use Topics     Alcohol use: Yes     Comment: rare     Family History   Problem Relation Age of Onset     Family History Negative Other          Current Outpatient Medications   Medication Sig Dispense Refill     methylphenidate (RITALIN) 20 MG tablet Take 1 tablet (20 mg) by mouth 2 times daily as needed (for lack of concentration) 60 tablet 0     triamcinolone (KENALOG) 0.1 % external cream Apply to rash of the buttock for 2 weeks 45 g 0     Allergies   Allergen Reactions     Cats        Reviewed and updated as needed this visit by clinical staff  Tobacco  Allergies  Meds              Reviewed and updated as needed this visit by Provider   Allergies                  Review of Systems  CONSTITUTIONAL: NEGATIVE for fever, chills, change in weight  INTEGUMENTARY/SKIN: NEGATIVE for worrisome moles  EYES: NEGATIVE for vision changes or irritation  ENT: NEGATIVE for ear, mouth and throat problems  RESP: NEGATIVE for significant cough or SOB  CV: NEGATIVE for chest pain, palpitations or peripheral edema  GI: NEGATIVE for nausea, abdominal pain, heartburn, or change in bowel habits   male: negative for dysuria, hematuria, decreased urinary stream, erectile dysfunction, urethral discharge  MUSCULOSKELETAL: NEGATIVE for significant arthralgias or myalgia  NEURO: NEGATIVE for weakness, dizziness or paresthesias  PSYCHIATRIC: NEGATIVE for changes in mood or affect    OBJECTIVE:   /74   Pulse 98   Temp 98  F (36.7  C) (Tympanic)   Ht 1.676 m (5' 6\")   Wt 75.8 kg (167 lb)   SpO2 97%   BMI 26.95 kg/m      Physical Exam  GENERAL: " "healthy, alert and no distress  EYES: Eyes grossly normal to inspection, PERRL and conjunctivae and sclerae normal  HENT: ear canals and TM's normal, nose and mouth without ulcers or lesions  NECK: no adenopathy, no asymmetry, masses, or scars and thyroid normal to palpation  RESP: lungs clear to auscultation - no rales, rhonchi or wheezes  CV: regular rate and rhythm, normal S1 S2, no S3 or S4, no murmur, click or rub, no peripheral edema and peripheral pulses strong  ABDOMEN: soft, nontender, no hepatosplenomegaly, no masses and bowel sounds normal  MS: no gross musculoskeletal defects noted, no edema  SKIN: Patient is noted to have an erythematous, excoriated rash on his buttock.  NEURO: Normal strength and tone, mentation intact and speech normal  PSYCH: mentation appears normal, affect normal/bright        ASSESSMENT/PLAN:   1. Annual physical exam    2. Mood disorder (H)  Recommending to start therapy.  Referral placed.  If symptoms are not improving, he may consider starting medication.  Patient agrees to that.  - MENTAL HEALTH REFERRAL  - Adult; Outpatient Treatment; Individual/Couples/Family/Group Therapy/Health Psychology; [facility] [place]: [facility] [phone]; We will contact you to schedule the appointment or please call with any questions    3. Eczema, unspecified type  Recommending a trial of Kenalog for dry skin on the buttock.  - triamcinolone (KENALOG) 0.1 % external cream; Apply to rash of the buttock BID for 1- 2 weeks as needed  Dispense: 45 g; Refill: 0    Patient has been advised of split billing requirements and indicates understanding:   COUNSELING:   Reviewed preventive health counseling, as reflected in patient instructions       Regular exercise       Healthy diet/nutrition    Estimated body mass index is 26.95 kg/m  as calculated from the following:    Height as of this encounter: 1.676 m (5' 6\").    Weight as of this encounter: 75.8 kg (167 lb).     Weight management plan: " Discussed healthy diet and exercise guidelines    He reports that he has never smoked. He has never used smokeless tobacco.      Counseling Resources:  ATP IV Guidelines  Pooled Cohorts Equation Calculator  FRAX Risk Assessment  ICSI Preventive Guidelines  Dietary Guidelines for Americans, 2010  USDA's MyPlate  ASA Prophylaxis  Lung CA Screening    Jacob Ambrose MD  [facility]

## 2020-10-28 ASSESSMENT — ANXIETY QUESTIONNAIRES: GAD7 TOTAL SCORE: 5

## 2020-12-04 ENCOUNTER — VIRTUAL VISIT (OUTPATIENT)
Dept: PSYCHOLOGY | Facility: CLINIC | Age: 25
End: 2020-12-04
Attending: FAMILY MEDICINE
Payer: COMMERCIAL

## 2020-12-04 ENCOUNTER — FCC EXTENDED DOCUMENTATION (OUTPATIENT)
Dept: PSYCHOLOGY | Facility: CLINIC | Age: 25
End: 2020-12-04

## 2020-12-04 DIAGNOSIS — F90.2 ATTENTION DEFICIT HYPERACTIVITY DISORDER, COMBINED TYPE: ICD-10-CM

## 2020-12-04 DIAGNOSIS — F40.10 SOCIAL ANXIETY DISORDER: Primary | ICD-10-CM

## 2020-12-04 PROCEDURE — 99207 PR NO BILLABLE SERVICE THIS VISIT: CPT | Mod: 95 | Performed by: PSYCHOLOGIST

## 2020-12-04 ASSESSMENT — COLUMBIA-SUICIDE SEVERITY RATING SCALE - C-SSRS
TOTAL  NUMBER OF INTERRUPTED ATTEMPTS LIFETIME: NO
2. HAVE YOU ACTUALLY HAD ANY THOUGHTS OF KILLING YOURSELF?: NO
1. HAVE YOU WISHED YOU WERE DEAD OR WISHED YOU COULD GO TO SLEEP AND NOT WAKE UP?: YES
TOTAL  NUMBER OF ABORTED OR SELF INTERRUPTED ATTEMPTS LIFETIME: NO
6. HAVE YOU EVER DONE ANYTHING, STARTED TO DO ANYTHING, OR PREPARED TO DO ANYTHING TO END YOUR LIFE?: NO
REASONS FOR IDEATION LIFETIME: COMPLETELY TO END OR STOP THE PAIN (YOU COULDN'T GO ON LIVING WITH THE PAIN OR HOW YOU WERE FEELING)
ATTEMPT LIFETIME: NO

## 2020-12-04 NOTE — PROGRESS NOTES
"                 Progress Note - Initial Visit    Client Name:  Denver Brewer Date: 20         Service Type: Individual     Visit Start Time: 11:07am Visit End Time: 11:54am    Visit #: 1 47 minutes    Attendees: Client attended alone    Service Modality:  Video Visit:      Provider verified identity through the following two step process.  Patient provided:  Patient     Telemedicine Visit: The patient's condition can be safely assessed and treated via synchronous audio and visual telemedicine encounter.      Reason for Telemedicine Visit: Services only offered telehealth    Originating Site (Patient Location): Patient's home    Distant Site (Provider Location): Provider Remote Setting    Consent:  The patient/guardian has verbally consented to: the potential risks and benefits of telemedicine (video visit) versus in person care; bill my insurance or make self-payment for services provided; and responsibility for payment of non-covered services.     Patient would like the video invitation sent by: Send to e-mail at: [email or web address]    Mode of Communication:  Video Conference via [employer]    As the provider I attest to compliance with applicable laws and regulations related to telemedicine.       DATA:   Interactive Complexity: No   Crisis: No     Presenting Concerns/  Current Stressors:   \" I have a lot of anxiety all the time.  There's just a lot of different issues. Especially like social anxiety.  The other stuff is I just constantly feel disappointment.  I think it's all part of anxiety. \"      ASSESSMENT:  Mental Status Assessment:  Appearance:   Appropriate   Eye Contact:   Fair   Psychomotor Behavior: Normal   Attitude:   Cooperative   Orientation:   All  Speech   Rate / Production: Normal/ Responsive   Volume:  Normal   Mood:    Euthymic  Affect:    Appropriate  Bright   Thought Content:  Clear   Thought Form:  Coherent   Insight:    Fair       Safety Issues and Plan for Safety and Risk " "Management:   [place] Suicide Severity Rating Scale (Lifetime/Recent)No flowsheet data found.  Patient denies current fears or concerns for personal safety.  Patient denies current or recent suicidal ideation or behaviors.  Patient denies current or recent homicidal ideation or behaviors.  Patient denies current or recent self injurious behavior or ideation.  Patient denies other safety concerns.  Recommended that patient call 911 or go to the local ED should there be a change in any of these risk factors.  Patient reports there are no firearms in the house.     Diagnostic Criteria:  Social Anxiety Disorder  A) A persistent pattern of inattention and/or hyperactivity-impulsivity that interferes with functioning or development, as characterized by (1) Inattention and/or (2) Hyperactivity and Impulsivity  - Often fails to give close attention to details or makes careless mistakes in schoolwork, at work, or during other activities  - Often has difficulty sustaining attention in tasks or play activities  - Is often easily distractedby extraneous stimuli  - Is often forgetful in daily activities  - Often fidgets with or taps hands or feet or squirms in seat  - Is often \"on the go,\" acting as if \"driven by a motor\"  - Often talks excessively  B) Several inattentive or hyperactive-impulsive symptoms were present prior to age 12 years  C) Several inattentive or hyperactive-impulsive symptoms are present in two or more settings  E) The Symptoms do not occur exclusively during the course of schizophrenia or another psychotic disorder and are not better explained by another mental disorder      DSM5 Diagnoses: (Sustained by DSM5 Criteria Listed Above)  Diagnoses: 300.23 (F40.10) Social Anxiety Disorder; Attention-Deficit/Hyperactivity Disorder  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 Go to Review Flowsheets activity to see all data.     Intervention:   CBT- Patient was educated on the CBT model and asked to bring in examples at next session, Educated on treatment planning and started identifying goals and interventions for treatment plan and worked on Automatic Thoughts related to social anxiety  Collateral Reports Completed:  Not Applicable      PLAN: (Homework, other):  1. Provider will continue Diagnostic Assessment.  Patient was given the following to do until next session:  Identify Automatic Thoughts that go with socializing and the accompanying anxiety; identify treatment goals.    2. Provider recommended the following referrals: None.        Michael Mckeon  December 4, 2020

## 2020-12-04 NOTE — PROGRESS NOTES
"[facility]   Provider Name:  Michael Mckeon PsyD     Credentials:      PATIENT'S NAME: Denver Brewer  PREFERRED NAME: \"Denver or Frank\"  PRONOUNS:  Just male pronouns    MRN: [id number]  : 1995   ACCT. NUMBER:  [id number]  DATE OF SERVICE: 20  START TIME:   END TIME:   PREFERRED PHONE: [phone]  May we leave a program related message: Yes  SERVICE MODALITY:  Video Visit:      Provider verified identity through the following two step process.  Patient provided:  Patient     Telemedicine Visit: The patient's condition can be safely assessed and treated via synchronous audio and visual telemedicine encounter.      Reason for Telemedicine Visit: Services only offered telehealth    Originating Site (Patient Location): Patient's home    Distant Site (Provider Location): Provider Remote Setting    Consent:  The patient/guardian has verbally consented to: the potential risks and benefits of telemedicine (video visit) versus in person care; bill my insurance or make self-payment for services provided; and responsibility for payment of non-covered services.     Patient would like the video invitation sent by: Send to e-mail at: [email or web address]    Mode of Communication:  Video Conference via [facility]    As the provider I attest to compliance with applicable laws and regulations related to telemedicine.    UNIVERSAL ADULT Mental Health DIAGNOSTIC ASSESSMENT    NOTE:  This session was the initial session of a general Diagnostic Assessment.  All five screeners were administered and a partial structured interview. This will be billed as a Psychiatric Diagnostic Evaluation on completion next session.  This session is not billed.      Identifying Information:  Patient is a 25 year old, .  The pronoun use throughout this assessment reflects the patient's chosen pronoun.  Patient was referred for an assessment by primary care provider.  Patient attended the session alone.     Chief " "Complaint:   The reason for seeking services at this time is: \" I have a lot of anxiety all the time.  There's just a lot of different issues. Especially like social anxiety.  The other stuff is I just constantly feel disappointment.  I think it's all part of anxiety. \"   The problem(s) began \" around college starting.  A little before that. \"  Patient has not attempted to resolve these concerns in the past.    Social/Family History:  Patient reported they grew up in \"Born in [place] and came to [place] in first grade through middle school and then to [place].\" They were raised by biological parents.  Parents stayed .  Patient reported that their childhood was \"not too great but not bad either. I grew up with  parents and their expectations are really really high.\"  Client reports having an older brother.  Patient described their current relationships with family of origin as \"Not the worse but not the best either (with his parents). I get angry with my brother but when I text with him we're fine\".      The patient describes their cultural background as \" I grew up with  speaking parents but I grew up mostly in [place]. \" His parents hold a lot of the  culture and this can be a point of contention between the client and his parents.  Cultural influences and impact on patient's life structure, values, norms, and healthcare: Level of Acculturation: Client identifies with Western culture and his parents identify more with their  culture..  Contextual influences on patient's health include: none.  These factors will be addressed in the Preliminary Treatment plan.  Patient identified their preferred language to be English. Patient reported they does not need the assistance of an  or other support involved in therapy.         Provider Name/ Credentials:  Michael Mckeon PsyD, SHARIF  December 4, 2020                "

## 2020-12-11 ENCOUNTER — VIRTUAL VISIT (OUTPATIENT)
Dept: PSYCHOLOGY | Facility: CLINIC | Age: 25
End: 2020-12-11
Attending: FAMILY MEDICINE
Payer: COMMERCIAL

## 2020-12-11 DIAGNOSIS — F40.10 SOCIAL ANXIETY DISORDER: Primary | ICD-10-CM

## 2020-12-11 DIAGNOSIS — F90.2 ATTENTION DEFICIT HYPERACTIVITY DISORDER, COMBINED TYPE: ICD-10-CM

## 2020-12-11 PROCEDURE — 90791 PSYCH DIAGNOSTIC EVALUATION: CPT | Mod: 95 | Performed by: PSYCHOLOGIST
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
## 2020-12-22 ENCOUNTER — VIRTUAL VISIT (OUTPATIENT)
Dept: PSYCHOLOGY | Facility: CLINIC | Age: 25
End: 2020-12-22
Payer: COMMERCIAL

## 2020-12-22 DIAGNOSIS — F90.2 ATTENTION DEFICIT HYPERACTIVITY DISORDER, COMBINED TYPE: ICD-10-CM

## 2020-12-22 DIAGNOSIS — F40.10 SOCIAL ANXIETY DISORDER: Primary | ICD-10-CM

## 2020-12-22 PROCEDURE — 90834 PSYTX W PT 45 MINUTES: CPT | Mod: 95 | Performed by: PSYCHOLOGIST

## 2020-12-22 NOTE — PROGRESS NOTES
"                                           Progress Note    Patient Name: Denver Brewer  Date: 12/22/20         Service Type: Individual      Session Start Time: 11:08am  Session End Time: 11:55am     Session Length: 47 minutes    Session #: 3    Attendees: Client attended alone    Service Modality:  Video Visit:      Provider verified identity through the following two step process.  Patient provided:  Patient photo    Telemedicine Visit: The patient's condition can be safely assessed and treated via synchronous audio and visual telemedicine encounter.      Reason for Telemedicine Visit: Services only offered telehealth    Originating Site (Patient Location): Patient's home    Distant Site (Provider Location): Provider Remote Setting    Consent:  The patient/guardian has verbally consented to: the potential risks and benefits of telemedicine (video visit) versus in person care; bill my insurance or make self-payment for services provided; and responsibility for payment of non-covered services.     Patient would like the video invitation sent by: Send to e-mail at: [email or web address]    Mode of Communication:  Video Conference via [employer]    As the provider I attest to compliance with applicable laws and regulations related to telemedicine.     Treatment Plan Last Reviewed: Started collaborating on this session  PHQ-9 / RAINER-7 : 6 / 8 (on 12/3/20)    DATA  Interactive Complexity: No  Crisis: No       Progress Since Last Session (Related to Symptoms / Goals / Homework):   Symptoms: No change : Client was tired as I woke him up when I called. He stated he was doing \"ok.\"    Homework: N/A      Episode of Care Goals: N/A     Current / Ongoing Stressors and Concerns:   COVID-19, qu[place]     Treatment Objective(s) Addressed in This Session:   Collaborate on his Treatment Plan     Intervention:   CBT: Explained CBT in relation to his goals; clarifying questions; open-ended questions; reframing  Summarizing; helped " define goals and objectives        ASSESSMENT: Current Emotional / Mental Status (status of significant symptoms):   Risk status (Self / Other harm or suicidal ideation)   Patient denies current fears or concerns for personal safety.   Patient denies current or recent suicidal ideation or behaviors.   Patient denies current or recent homicidal ideation or behaviors.   Patient denies current or recent self injurious behavior or ideation.   Patient denies other safety concerns.   Patient reports there has been no change in risk factors since their last session.     Patient reports there has been no change in protective factors since their last session.     Recommended that patient call 911 or go to the local ED should there be a change in any of these risk factors.     Appearance:   Appropriate    Eye Contact:   Poor   Psychomotor Behavior: Normal    Attitude:   Cooperative    Orientation:   All   Speech    Rate / Production: Normal/ Responsive Normal     Volume:  Normal    Mood:    Euthymic   Affect:    Appropriate    Thought Content:  Clear    Thought Form:  Coherent  Logical    Insight:    Fair      Medication Review:   No changes to current psychiatric medication(s)     Medication Compliance:   Yes     Changes in Health Issues:   None reported     Chemical Use Review:   Substance Use: Chemical use reviewed, no active concerns identified      Tobacco Use: No current tobacco use.      Diagnosis:  1. Social anxiety disorder    2. Attention deficit hyperactivity disorder, combined type        Collateral Reports Completed:   Not Applicable    PLAN: (Patient Tasks / Therapist Tasks / Other)  Client will track times he feels anxious, the context of when he first noticed the anxiety, and the thoughts that go with his anxiety.  His next scheduled appointment is on January 5, 2021.        Michael Mckeon                                                      "    ______________________________________________________________________    Treatment Plan    Patient's Name: Denver Brewer  YOB: 1995    Date: 12/22/20    DSM5 Diagnoses: 300.23 (F40.10) Social Anxiety Disorder  Psychosocial / Contextual Factors: COVID-19, johnnye  WHODAS: 28 (on 12/3/20)    Referral / Collaboration:  Referral to another professional/service is not indicated at this time..    Anticipated number of session or this episode of care: 15      MeasurableTreatment Goal(s) related to diagnosis / functional impairment(s)  Goal 1: Patient will \"stop having those feelings (anxiety) when those situations come up (social).\"    I will know I've met my goal when I stop feeling anxious in general.  Baseline anxiety in social situations is \"high (7-9) with 10 being highest and it depends on the situation).\"  The goal is to manage the anxiety so baseline goes to a \"low (3-5).\"     Objective #A Client will track when he gets anxious due to social situations and report on his anxiety, the context of his anxiety, and the thoughts that accompany it.    Patient will identify his fears / thoughts that contribute to feeling anxious.  Status: New - Date: 12/22/20     Intervention(s)  Therapist will assign homework as appropriate  role-play effective communication skills  teach about social skills.      Goal 2: Patient will \"stop having those feelings (anxiety) when those situations come up (social).\"    I will know I've met my goal when I stop feeling anxious in general.  Baseline anxiety in non-social situations is \"high (7-9) with 10 being highest and it depends on the situation).\"  The goal is to manage the anxiety so baseline goes to \"low (3-5).\"    Objective #A Client will track when he gets anxious due to non-social situations and report on his anxiety, the context of his anxiety, and the thoughts that accompany it.    Status: New - Date: 12/22/20     Patient will identify his fears / thoughts " that contribute to feeling anxious.    Intervention(s)  Therapist will assign homework as appropriate  provide educational materials on anxiety (vs paranoia)  teach assertiveness skills. To contact friends as opposed to always waiting for others to initiate contact with him..      Patient has reviewed and agreed to the above plan.      Michael Mckeon  December 22, 2020

## 2021-01-05 ENCOUNTER — VIRTUAL VISIT (OUTPATIENT)
Dept: PSYCHOLOGY | Facility: CLINIC | Age: 26
End: 2021-01-05
Payer: COMMERCIAL

## 2021-01-05 DIAGNOSIS — F40.10 SOCIAL ANXIETY DISORDER: Primary | ICD-10-CM

## 2021-01-05 DIAGNOSIS — F90.2 ATTENTION DEFICIT HYPERACTIVITY DISORDER, COMBINED TYPE: ICD-10-CM

## 2021-01-05 PROCEDURE — 90834 PSYTX W PT 45 MINUTES: CPT | Mod: 95 | Performed by: PSYCHOLOGIST

## 2021-01-05 NOTE — PROGRESS NOTES
Progress Note    Patient Name: Denver Brewer  Date: 1/5/21         Service Type: Individual      Session Start Time: 11:02am  Session End Time: 11:54am     Session Length: 52 minutes    Session #: 4    Attendees: Client attended alone    Service Modality:  Video Visit:      Provider verified identity through the following two step process.  Patient provided:  Patient photo    Telemedicine Visit: The patient's condition can be safely assessed and treated via synchronous audio and visual telemedicine encounter.      Reason for Telemedicine Visit: Services only offered telehealth    Originating Site (Patient Location): Patient's home    Distant Site (Provider Location): Provider Remote Setting    Consent:  The patient/guardian has verbally consented to: the potential risks and benefits of telemedicine (video visit) versus in person care; bill my insurance or make self-payment for services provided; and responsibility for payment of non-covered services.     Patient would like the video invitation sent by: Send to e-mail at: [email or web address]    Mode of Communication:  Video Conference via [employer]well    As the provider I attest to compliance with applicable laws and regulations related to telemedicine.     Treatment Plan Last Reviewed: Started collaborating on this session  PHQ-9 / RAINER-7 : 6 / 8 (on 12/3/20)    DATA  Interactive Complexity: No  Crisis: No       Progress Since Last Session (Related to Symptoms / Goals / Homework):   Symptoms: Improving : Pretty good; not too bad.  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    Homework: N/A       Episode of Care Goals: N/A     Current / Ongoing Stressors and Concerns:   COVID-19, quarantine     Treatment Objective(s) Addressed in This Session:   Collaborate on his Treatment Plan     Intervention:   CBT: Explained CBT in relation to his goals; reviewed and  identified cognitive distortions (Mindfulness, Discounting the Positive), clarifying questions; open-ended questions; reframing  Empathy, clarified goals (be a better job interview, go out on a date, managing people, talking more to my parents); some role modeling on how to be assertive with his parents on their weekly call        ASSESSMENT: Current Emotional / Mental Status (status of significant symptoms):   Risk status (Self / Other harm or suicidal ideation)   Patient denies current fears or concerns for personal safety.   Patient denies current or recent suicidal ideation or behaviors.   Patient denies current or recent homicidal ideation or behaviors.   Patient denies current or recent self injurious behavior or ideation.   Patient denies other safety concerns.   Patient reports there has been no change in risk factors since their last session.     Patient reports there has been no change in protective factors since their last session.     Recommended that patient call 911 or go to the local ED should there be a change in any of these risk factors.     Appearance:   Appropriate    Eye Contact:   Poor   Psychomotor Behavior: Normal    Attitude:   Cooperative    Orientation:   All   Speech    Rate / Production: Normal/ Responsive Normal     Volume:  Normal    Mood:    Euthymic   Affect:    Appropriate    Thought Content:  Clear    Thought Form:  Coherent  Logical    Insight:    Fair      Medication Review:   No changes to current psychiatric medication(s)     Medication Compliance:   Yes     Changes in Health Issues:   None reported     Chemical Use Review:   Substance Use: Chemical use reviewed, no active concerns identified      Tobacco Use: No current tobacco use.      Diagnosis:  1. Social anxiety disorder    2. Attention deficit hyperactivity disorder, combined type        Collateral Reports Completed:   Not Applicable    PLAN: (Patient Tasks / Therapist Tasks / Other)  Client will the list of Cognitive  "Distortions (CDs) on the Wikipedia page (based on Rosendo Schwarz's list).  He will identify CDs that he engages in, provide examples of when they come up, and the self-talk that accompanies them.  He may also let his parents know how he feels (leads to a very low self-image) about when they call and ask \"if I'm better yet.\"  Being assertive is a possible topic of discussion during his next session.  His next scheduled appointment is on January 12, 2021.        Mihcael Jeff Mike                                                         ______________________________________________________________________    Treatment Plan    Patient's Name: Denver Brewer  YOB: 1995    Date: 12/22/20    DSM5 Diagnoses: 300.23 (F40.10) Social Anxiety Disorder  Psychosocial / Contextual Factors: COVID-19ghislaine  WHODAS: 28 (on 12/3/20)    Referral / Collaboration:  Referral to another professional/service is not indicated at this time..    Anticipated number of session or this episode of care: 15      MeasurableTreatment Goal(s) related to diagnosis / functional impairment(s)  Goal 1: Patient will \"stop having those feelings (anxiety) when those situations come up (social).\"    I will know I've met my goal when I stop feeling anxious in general.  Baseline anxiety in social situations is \"high (7-9) with 10 being highest and it depends on the situation).\"  The goal is to manage the anxiety so baseline goes to a \"low (3-5).\"     Objective #A Client will track when he gets anxious due to social situations and report on his anxiety, the context of his anxiety, and the thoughts that accompany it.    Patient will identify his fears / thoughts that contribute to feeling anxious.  Status: New - Date: 12/22/20     Intervention(s)  Therapist will assign homework as appropriate  role-play effective communication skills  teach about social skills.      Goal 2: Patient will \"stop having those feelings (anxiety) when those " "situations come up (social).\"    I will know I've met my goal when I stop feeling anxious in general.  Baseline anxiety in non-social situations is \"high (7-9) with 10 being highest and it depends on the situation).\"  The goal is to manage the anxiety so baseline goes to \"low (3-5).\"    Objective #A Client will track when he gets anxious due to non-social situations and report on his anxiety, the context of his anxiety, and the thoughts that accompany it.    Status: New - Date: 12/22/20     Patient will identify his fears / thoughts that contribute to feeling anxious.    Intervention(s)  Therapist will assign homework as appropriate  provide educational materials on anxiety (vs paranoia)  teach assertiveness skills. To contact friends as opposed to always waiting for others to initiate contact with him.      Patient has reviewed and agreed to the above plan.      Michael Mckeon  January 5, 2021  "

## 2021-01-12 ENCOUNTER — VIRTUAL VISIT (OUTPATIENT)
Dept: PSYCHOLOGY | Facility: CLINIC | Age: 26
End: 2021-01-12
Payer: COMMERCIAL

## 2021-01-12 DIAGNOSIS — F90.2 ATTENTION DEFICIT HYPERACTIVITY DISORDER, COMBINED TYPE: ICD-10-CM

## 2021-01-12 DIAGNOSIS — F40.10 SOCIAL ANXIETY DISORDER: Primary | ICD-10-CM

## 2021-01-12 PROCEDURE — 90834 PSYTX W PT 45 MINUTES: CPT | Mod: 95 | Performed by: PSYCHOLOGIST

## 2021-01-12 NOTE — PROGRESS NOTES
"                                           Progress Note    Patient Name: Denver Brewer  Date: 1/12/21         Service Type: Individual      Session Start Time: 2:03pm  Session End Time: 2:55pm     Session Length: 52 minutes    Session #: 5    Attendees: Client attended alone    Service Modality:  Video Visit:      Provider verified identity through the following two step process.  Patient provided:  Patient photo    Telemedicine Visit: The patient's condition can be safely assessed and treated via synchronous audio and visual telemedicine encounter.      Reason for Telemedicine Visit: Services only offered telehealth    Originating Site (Patient Location): Patient's home    Distant Site (Provider Location): Provider Remote Setting    Consent:  The patient/guardian has verbally consented to: the potential risks and benefits of telemedicine (video visit) versus in person care; bill my insurance or make self-payment for services provided; and responsibility for payment of non-covered services.     Patient would like the video invitation sent by: Send to e-mail at: [email or web address]    Mode of Communication:  Video Conference via Amwell    As the provider I attest to compliance with applicable laws and regulations related to telemedicine.     Treatment Plan Last Reviewed: Started collaborating on this session  PHQ-9 / RAINER-7 : 6 / 8 (on 12/3/20)    DATA  Interactive Complexity: No  Crisis: No       Progress Since Last Session (Related to Symptoms / Goals / Homework):   Symptoms: No change : \"It's going pretty good.  Nothing good or bad.\"    Homework: Partially completed       The one (cognitive distortion) I identify with the most is Jumping to Conclusions, and Blaming.       Episode of Care Goals: Satisfactory progress - ACTION (Actively working towards change); Intervened by reinforcing change plan / affirming steps taken     Current / Ongoing Stressors and Concerns:   COVID-19, quarantine     Treatment " Objective(s) Addressed in This Session:   Review homework of cognitive distortions (CDs); identify the most relevant CDs that pertain to client; clarify CDs when needed     Intervention:   CBT: Reviewed CBT in relation to his goals; provided examples of challenging cognitive distortions; reviewed and identified cognitive distortions (Mindfulness, Discounting the Positive, Exaggerating/Minimizing, Blaming), clarifying questions; open-ended questions; reframing        ASSESSMENT: Current Emotional / Mental Status (status of significant symptoms):   Risk status (Self / Other harm or suicidal ideation)   Patient denies current fears or concerns for personal safety.   Patient denies current or recent suicidal ideation or behaviors.   Patient denies current or recent homicidal ideation or behaviors.   Patient denies current or recent self injurious behavior or ideation.   Patient denies other safety concerns.   Patient reports there has been no change in risk factors since their last session.     Patient reports there has been no change in protective factors since their last session.     Recommended that patient call 911 or go to the local ED should there be a change in any of these risk factors.     Appearance:   Appropriate    Eye Contact:   Poor   Psychomotor Behavior: Normal    Attitude:   Cooperative    Orientation:   All   Speech    Rate / Production: Normal/ Responsive Normal     Volume:  Normal    Mood:    Euthymic   Affect:    Appropriate    Thought Content:  Clear    Thought Form:  Coherent  Logical    Insight:    Fair      Medication Review:   No changes to current psychiatric medication(s)     Medication Compliance:   Yes     Changes in Health Issues:   None reported     Chemical Use Review:   Substance Use: Chemical use reviewed, no active concerns identified      Tobacco Use: No current tobacco use.      Diagnosis:  1. Social anxiety disorder    2. Attention deficit hyperactivity disorder, combined type   "      Collateral Reports Completed:   Not Applicable    PLAN: (Patient Tasks / Therapist Tasks / Other)  Client will track and identify cognitive distortions.  He will also report on the context in which they occur and what the thoughts are that go with each distortion.  Being assertive is a possible topic of discussion during his next session.  His next scheduled appointment is on January 19, 2021.        Michael Mckeon                                                         ______________________________________________________________________    Treatment Plan    Patient's Name: Denver Brewer  YOB: 1995    Date: 12/22/20    DSM5 Diagnoses: 300.23 (F40.10) Social Anxiety Disorder  Psychosocial / Contextual Factors: COVID-19, ghislaine  WHODAS: 28 (on 12/3/20)    Referral / Collaboration:  Referral to another professional/service is not indicated at this time..    Anticipated number of session or this episode of care: 15      MeasurableTreatment Goal(s) related to diagnosis / functional impairment(s)  Goal 1: Patient will \"stop having those feelings (anxiety) when those situations come up (social).\"    I will know I've met my goal when I stop feeling anxious in general.  Baseline anxiety in social situations is \"high (7-9) with 10 being highest and it depends on the situation).\"  The goal is to manage the anxiety so baseline goes to a \"low (3-5).\"     Objective #A Client will track when he gets anxious due to social situations and report on his anxiety, the context of his anxiety, and the thoughts that accompany it.    Patient will identify his fears / thoughts that contribute to feeling anxious.  Status: New - Date: 12/22/20     Intervention(s)  Therapist will assign homework as appropriate  role-play effective communication skills  teach about social skills.      Goal 2: Patient will \"stop having those feelings (anxiety) when those situations come up (social).\"    I will know I've met my " "goal when I stop feeling anxious in general.  Baseline anxiety in non-social situations is \"high (7-9) with 10 being highest and it depends on the situation).\"  The goal is to manage the anxiety so baseline goes to \"low (3-5).\"    Objective #A Client will track when he gets anxious due to non-social situations and report on his anxiety, the context of his anxiety, and the thoughts that accompany it.    Status: New - Date: 12/22/20     Patient will identify his fears / thoughts that contribute to feeling anxious.    Intervention(s)  Therapist will assign homework as appropriate  provide educational materials on anxiety (vs paranoia)  teach assertiveness skills. To contact friends as opposed to always waiting for others to initiate contact with him.      Patient has reviewed and agreed to the above plan.      Michael Mckeon  January 12, 2021  "

## 2021-01-19 ENCOUNTER — VIRTUAL VISIT (OUTPATIENT)
Dept: PSYCHOLOGY | Facility: CLINIC | Age: 26
End: 2021-01-19
Payer: COMMERCIAL

## 2021-01-19 DIAGNOSIS — F40.10 SOCIAL ANXIETY DISORDER: Primary | ICD-10-CM

## 2021-01-19 DIAGNOSIS — F90.2 ATTENTION DEFICIT HYPERACTIVITY DISORDER, COMBINED TYPE: ICD-10-CM

## 2021-01-19 PROCEDURE — 90834 PSYTX W PT 45 MINUTES: CPT | Mod: 95 | Performed by: PSYCHOLOGIST

## 2021-01-19 NOTE — PROGRESS NOTES
"                                           Progress Note    Patient Name: Denver Brewer  Date: 1/19/21         Service Type: Individual      Session Start Time: 11:04am  Session End Time: 11:55am     Session Length: 51 minutes    Session #: 6    Attendees: Client attended alone    Service Modality:  Video Visit:      Provider verified identity through the following two step process.  Patient provided:  Patient photo    Telemedicine Visit: The patient's condition can be safely assessed and treated via synchronous audio and visual telemedicine encounter.      Reason for Telemedicine Visit: Services only offered telehealth    Originating Site (Patient Location): Patient's home    Distant Site (Provider Location): Provider Remote Setting    Consent:  The patient/guardian has verbally consented to: the potential risks and benefits of telemedicine (video visit) versus in person care; bill my insurance or make self-payment for services provided; and responsibility for payment of non-covered services.     Patient would like the video invitation sent by: Send to e-mail at: [email or web address]    Mode of Communication:  Video Conference via Amwell    As the provider I attest to compliance with applicable laws and regulations related to telemedicine.     Treatment Plan Last Reviewed: Started collaborating on this session  PHQ-9 / RAINER-7 : 6 / 8 (on 12/3/20)    DATA  Interactive Complexity: No  Crisis: No       Progress Since Last Session (Related to Symptoms / Goals / Homework):   Symptoms: Improving : \"It's pretty good.\"    Homework: Partially completed       The one (cognitive distortion) I identify with the most is Jumping to Conclusions, and Blaming.       Episode of Care Goals: Satisfactory progress - ACTION (Actively working towards change); Intervened by reinforcing change plan / affirming steps taken     Current / Ongoing Stressors and Concerns:   COVID-19, quarantine     Treatment Objective(s) Addressed in This " "Session:   Discussed if he \"plays too many video games.\"  Also evaluated communication styles and how to validate others and open up conversation.  Discussed \"making excuses and always being right\" and how that can affect relationships.     Intervention:   CBT: Psychoeducationi on communciation styles, validation, and how to keep conversations going (and what can shut it down), also discussed how \"making excuses\" can affect relationships; provided examples of challenging cognitive distortions; identified cognitive distortion (Blaming), clarifying questions; open-ended questions; reframing  Empathy, other rapport building skills, homework        ASSESSMENT: Current Emotional / Mental Status (status of significant symptoms):   Risk status (Self / Other harm or suicidal ideation)   Patient denies current fears or concerns for personal safety.   Patient denies current or recent suicidal ideation or behaviors.   Patient denies current or recent homicidal ideation or behaviors.   Patient denies current or recent self injurious behavior or ideation.   Patient denies other safety concerns.   Patient reports there has been no change in risk factors since their last session.     Patient reports there has been no change in protective factors since their last session.     Recommended that patient call 911 or go to the local ED should there be a change in any of these risk factors.     Appearance:   Appropriate    Eye Contact:   Poor   Psychomotor Behavior: Normal    Attitude:   Cooperative    Orientation:   All   Speech    Rate / Production: Normal/ Responsive Normal     Volume:  Normal    Mood:    Euthymic   Affect:    Appropriate    Thought Content:  Clear    Thought Form:  Coherent  Logical    Insight:    Fair      Medication Review:   No changes to current psychiatric medication(s)     Medication Compliance:   Yes     Changes in Health Issues:   None reported     Chemical Use Review:   Substance Use: Chemical use reviewed, " "no active concerns identified      Tobacco Use: No current tobacco use.      Diagnosis:  1. Social anxiety disorder    2. Attention deficit hyperactivity disorder, combined type        Collateral Reports Completed:   Not Applicable    PLAN: (Patient Tasks / Therapist Tasks / Other)  Client will track and identify times he \"made excuses\" or did not take accountability.  He will also practice validation.  His next scheduled appointment is on January 19, 2021.        Michael Saldanawa                                                         ______________________________________________________________________    Treatment Plan    Patient's Name: Denver Brewer  YOB: 1995    Date: 12/22/20    DSM5 Diagnoses: 300.23 (F40.10) Social Anxiety Disorder  Psychosocial / Contextual Factors: COVID-19ghislaine  WHODAS: 28 (on 12/3/20)    Referral / Collaboration:  Referral to another professional/service is not indicated at this time..    Anticipated number of session or this episode of care: 15      MeasurableTreatment Goal(s) related to diagnosis / functional impairment(s)  Goal 1: Patient will \"stop having those feelings (anxiety) when those situations come up (social).\"    I will know I've met my goal when I stop feeling anxious in general.  Baseline anxiety in social situations is \"high (7-9) with 10 being highest and it depends on the situation).\"  The goal is to manage the anxiety so baseline goes to a \"low (3-5).\"     Objective #A Client will track when he gets anxious due to social situations and report on his anxiety, the context of his anxiety, and the thoughts that accompany it.    Patient will identify his fears / thoughts that contribute to feeling anxious.  Status: New - Date: 12/22/20     Intervention(s)  Therapist will assign homework as appropriate  role-play effective communication skills  teach about social skills.      Goal 2: Patient will \"stop having those feelings (anxiety) when those " "situations come up (social).\"    I will know I've met my goal when I stop feeling anxious in general.  Baseline anxiety in non-social situations is \"high (7-9) with 10 being highest and it depends on the situation).\"  The goal is to manage the anxiety so baseline goes to \"low (3-5).\"    Objective #A Client will track when he gets anxious due to non-social situations and report on his anxiety, the context of his anxiety, and the thoughts that accompany it.    Status: New - Date: 12/22/20     Patient will identify his fears / thoughts that contribute to feeling anxious.    Intervention(s)  Therapist will assign homework as appropriate  provide educational materials on anxiety (vs paranoia)  teach assertiveness skills. To contact friends as opposed to always waiting for others to initiate contact with him.      Patient has reviewed and agreed to the above plan.      Michael Mckeon  January 19, 2021  "

## 2021-01-26 ENCOUNTER — VIRTUAL VISIT (OUTPATIENT)
Dept: PSYCHOLOGY | Facility: CLINIC | Age: 26
End: 2021-01-26
Payer: COMMERCIAL

## 2021-01-26 DIAGNOSIS — F90.2 ATTENTION DEFICIT HYPERACTIVITY DISORDER, COMBINED TYPE: ICD-10-CM

## 2021-01-26 DIAGNOSIS — F40.10 SOCIAL ANXIETY DISORDER: Primary | ICD-10-CM

## 2021-01-26 PROCEDURE — 90834 PSYTX W PT 45 MINUTES: CPT | Mod: 95 | Performed by: PSYCHOLOGIST

## 2021-01-26 NOTE — PROGRESS NOTES
"                                           Progress Note    Patient Name: Denver Brewer  Date: 1/26/21         Service Type: Individual      Session Start Time: 1:00pm  Session End Time: 1:49pm     Session Length: 49 minutes    Session #: 7    Attendees: Client attended alone    Service Modality:  Video Visit:      Provider verified identity through the following two step process.  Patient provided:  Patient photo    Telemedicine Visit: The patient's condition can be safely assessed and treated via synchronous audio and visual telemedicine encounter.      Reason for Telemedicine Visit: Services only offered telehealth    Originating Site (Patient Location): Patient's home    Distant Site (Provider Location): Provider Remote Setting    Consent:  The patient/guardian has verbally consented to: the potential risks and benefits of telemedicine (video visit) versus in person care; bill my insurance or make self-payment for services provided; and responsibility for payment of non-covered services.     Patient would like the video invitation sent by: Send to e-mail at: [email or web address]    Mode of Communication:  Video Conference via [employer]    As the provider I attest to compliance with applicable laws and regulations related to telemedicine.     Treatment Plan Last Reviewed: Started collaborating on this session  PHQ-9 / RAINER-7 : 6 / 8 (on 12/3/20)    DATA  Interactive Complexity: No  Crisis: No       Progress Since Last Session (Related to Symptoms / Goals / Homework):   Symptoms: Improving : \" I'm doing good. \"    Homework: Partially completed          Episode of Care Goals: Satisfactory progress - ACTION (Actively working towards change); Intervened by reinforcing change plan / affirming steps taken     Current / Ongoing Stressors and Concerns:   COVID-19, quarantine     Treatment Objective(s) Addressed in This Session:   Discussed communication patterns client has with his brother and mother to discern the " "differences since he is able to take feedback from his brother (\"eventually\") and not his mother (he gets frustrated and does not want to engage with her).  Explored possible ways for client to meet a significant other which he would like to do but \"I'm too shy to even start.\"     Intervention:   CBT: Psychoeducation on communciation styles, engaging in conversations (and what may make it easier for him to engage in 'small talk' with a male vs a female [\"I don't want to look creepy.\"), provided examples of how a significant relationship develops and the pros and cons of trying to meet someone online; clarifying questions; open-ended questions; reframing  Emapthy, understanding, and other rapport building skills        ASSESSMENT: Current Emotional / Mental Status (status of significant symptoms):   Risk status (Self / Other harm or suicidal ideation)   Patient denies current fears or concerns for personal safety.   Patient denies current or recent suicidal ideation or behaviors.   Patient denies current or recent homicidal ideation or behaviors.   Patient denies current or recent self injurious behavior or ideation.   Patient denies other safety concerns.   Patient reports there has been no change in risk factors since their last session.     Patient reports there has been no change in protective factors since their last session.     Recommended that patient call 911 or go to the local ED should there be a change in any of these risk factors.     Appearance:   Appropriate    Eye Contact:   Poor   Psychomotor Behavior: Normal    Attitude:   Cooperative    Orientation:   All   Speech    Rate / Production: Normal/ Responsive Normal     Volume:  Normal    Mood:    Euthymic   Affect:    Appropriate    Thought Content:  Clear    Thought Form:  Coherent  Logical    Insight:    Fair      Medication Review:   No changes to current psychiatric medication(s)     Medication Compliance:   Yes     Changes in Health Issues:   None " "reported     Chemical Use Review:   Substance Use: Chemical use reviewed, no active concerns identified      Tobacco Use: No current tobacco use.      Diagnosis:  1. Social anxiety disorder    2. Attention deficit hyperactivity disorder, combined type        Collateral Reports Completed:   Not Applicable    PLAN: (Patient Tasks / Therapist Tasks / Other)  Client will contemplate what it would be like to meet and talk to women although he stated he is clearly not ready to start (due to social anxiety).  He will track and identify times he \"made excuses\" or did not take accountability.  His next scheduled appointment is on February 16, 2021.        Michael Jeff Mike                                                         ______________________________________________________________________    Treatment Plan    Patient's Name: Denver Brewer  YOB: 1995    Date: 12/22/20    DSM5 Diagnoses: 300.23 (F40.10) Social Anxiety Disorder  Psychosocial / Contextual Factors: COVID-19ghislaine  WHODAS: 28 (on 12/3/20)    Referral / Collaboration:  Referral to another professional/service is not indicated at this time..    Anticipated number of session or this episode of care: 15      MeasurableTreatment Goal(s) related to diagnosis / functional impairment(s)  Goal 1: Patient will \"stop having those feelings (anxiety) when those situations come up (social).\"    I will know I've met my goal when I stop feeling anxious in general.  Baseline anxiety in social situations is \"high (7-9) with 10 being highest and it depends on the situation).\"  The goal is to manage the anxiety so baseline goes to a \"low (3-5).\"     Objective #A Client will track when he gets anxious due to social situations and report on his anxiety, the context of his anxiety, and the thoughts that accompany it.    Patient will identify his fears / thoughts that contribute to feeling anxious.  Status: New - Date: 12/22/20 " "    Intervention(s)  Therapist will assign homework as appropriate  role-play effective communication skills  teach about social skills.      Goal 2: Patient will \"stop having those feelings (anxiety) when those situations come up (social).\"    I will know I've met my goal when I stop feeling anxious in general.  Baseline anxiety in non-social situations is \"high (7-9) with 10 being highest and it depends on the situation).\"  The goal is to manage the anxiety so baseline goes to \"low (3-5).\"    Objective #A Client will track when he gets anxious due to non-social situations and report on his anxiety, the context of his anxiety, and the thoughts that accompany it.    Status: New - Date: 12/22/20     Patient will identify his fears / thoughts that contribute to feeling anxious.    Intervention(s)  Therapist will assign homework as appropriate  provide educational materials on anxiety (vs paranoia)  teach assertiveness skills. To contact friends as opposed to always waiting for others to initiate contact with him.      Patient has reviewed and agreed to the above plan.      Michael Mckeon  January 26, 2021  "

## 2021-02-16 ENCOUNTER — VIRTUAL VISIT (OUTPATIENT)
Dept: PSYCHOLOGY | Facility: CLINIC | Age: 26
End: 2021-02-16
Payer: COMMERCIAL

## 2021-02-16 DIAGNOSIS — F40.10 SOCIAL ANXIETY DISORDER: Primary | ICD-10-CM

## 2021-02-16 DIAGNOSIS — F90.2 ATTENTION DEFICIT HYPERACTIVITY DISORDER, COMBINED TYPE: ICD-10-CM

## 2021-02-16 PROCEDURE — 90834 PSYTX W PT 45 MINUTES: CPT | Mod: 95 | Performed by: PSYCHOLOGIST

## 2021-02-16 NOTE — PROGRESS NOTES
"                                           Progress Note    Patient Name: Denver Brewer  Date: 2/16/21         Service Type: Individual      Session Start Time: 1:02pm  Session End Time: 1:54pm     Session Length: 52 minutes    Session #: 8    Attendees: Client attended alone    Service Modality:  Video Visit:      Provider verified identity through the following two step process.  Patient provided:  Patient photo    Telemedicine Visit: The patient's condition can be safely assessed and treated via synchronous audio and visual telemedicine encounter.      Reason for Telemedicine Visit: Services only offered telehealth    Originating Site (Patient Location): Patient's home    Distant Site (Provider Location): Provider Remote Setting    Consent:  The patient/guardian has verbally consented to: the potential risks and benefits of telemedicine (video visit) versus in person care; bill my insurance or make self-payment for services provided; and responsibility for payment of non-covered services.     Patient would like the video invitation sent by: Send to e-mail at: [email or web address]    Mode of Communication:  Video Conference via Amwell    As the provider I attest to compliance with applicable laws and regulations related to telemedicine.     Treatment Plan Last Reviewed: Started collaborating on this session  PHQ-9 / RAINER-7 : 6 / 8 (on 12/3/20)    DATA  Interactive Complexity: No  Crisis: No       Progress Since Last Session (Related to Symptoms / Goals / Homework):   Symptoms: Client recently lost his job due to massive layoffs; he is nervous about his parents reaction, if he will have insurance    Homework: Partially completed          Episode of Care Goals: Satisfactory progress - ACTION (Actively working towards change); Intervened by reinforcing change plan / affirming steps taken     Current / Ongoing Stressors and Concerns:   COVID-19, quarantine     Treatment Objective(s) Addressed in This Session:   \"I " "always argue needlessly.  I never just accept something the way it is.\"  Client laid off starting 2/26.  Nervous talking to parents about it, \"I don't like talking to them.\"  \"I always needlessly create conflict.\"  \"I don't filter what I'm going to say when I don't take my medication.\"  \"I feel down and gloomy\" :  Tracked it down to feeling bad about not taking medication.  Bx's he would feel  \"good\" about are \"cleaning my apartment,\" working on my resume and applying for jobs.\"  \"[place] with taking my medications (ADHD) daily.\"  He's nervous about interviewing for a new job.  Wants to change negative self-talk he believes were taught to him as a child (never good enough, belittling accomplishments)      Intervention:   CBT: Psychoeducation on communciation styles, identified automatic thoughts and taught on challenging them; clarifying questions; open-ended questions; reframing; assigned homework; pattern recognition  Emapthy, understanding, and other rapport building skills        ASSESSMENT: Current Emotional / Mental Status (status of significant symptoms):   Risk status (Self / Other harm or suicidal ideation)   Patient denies current fears or concerns for personal safety.   Patient denies current or recent suicidal ideation or behaviors.   Patient denies current or recent homicidal ideation or behaviors.   Patient denies current or recent self injurious behavior or ideation.   Patient denies other safety concerns.   Patient reports there has been no change in risk factors since their last session.     Patient reports there has been no change in protective factors since their last session.     Recommended that patient call 911 or go to the local ED should there be a change in any of these risk factors.     Appearance:   Appropriate    Eye Contact:   Poor   Psychomotor Behavior: Normal    Attitude:   Cooperative    Orientation:   All   Speech    Rate / Production: Normal/ Responsive Normal " "    Volume:  Normal    Mood:    Euthymic   Affect:    Appropriate    Thought Content:  Clear    Thought Form:  Coherent  Logical    Insight:    Fair      Medication Review:   No changes to current psychiatric medication(s)     Medication Compliance:   Yes     Changes in Health Issues:   None reported     Chemical Use Review:   Substance Use: Chemical use reviewed, no active concerns identified      Tobacco Use: No current tobacco use.      Diagnosis:  1. Social anxiety disorder    2. Attention deficit hyperactivity disorder, combined type        Collateral Reports Completed:   Not Applicable    PLAN: (Patient Tasks / Therapist Tasks / Other)  Client will unhealthy negative self-talk and bring them to the next session to learn how to challenge them.  He will also \"experiment with taking his ADHD medication every-other-day (instead of PRN only) to evaluate how productive he is on medication vs off.  His next scheduled appointment is on March 8, 2021.        Michael Mckeon                                                         ______________________________________________________________________    Treatment Plan    Patient's Name: Denver Brewer  YOB: 1995    Date: 12/22/20    DSM5 Diagnoses: 300.23 (F40.10) Social Anxiety Disorder  Psychosocial / Contextual Factors: COVID-19ghislaine  WHODAS: 28 (on 12/3/20)    Referral / Collaboration:  Referral to another professional/service is not indicated at this time..    Anticipated number of session or this episode of care: 15      MeasurableTreatment Goal(s) related to diagnosis / functional impairment(s)  Goal 1: Patient will \"stop having those feelings (anxiety) when those situations come up (social).\"    I will know I've met my goal when I stop feeling anxious in general.  Baseline anxiety in social situations is \"high (7-9) with 10 being highest and it depends on the situation).\"  The goal is to manage the anxiety so baseline goes to a \"low " "(3-5).\"     Objective #A Client will track when he gets anxious due to social situations and report on his anxiety, the context of his anxiety, and the thoughts that accompany it.    Patient will identify his fears / thoughts that contribute to feeling anxious.  Status: New - Date: 12/22/20     Intervention(s)  Therapist will assign homework as appropriate  role-play effective communication skills  teach about social skills.      Goal 2: Patient will \"stop having those feelings (anxiety) when those situations come up (social).\"    I will know I've met my goal when I stop feeling anxious in general.  Baseline anxiety in non-social situations is \"high (7-9) with 10 being highest and it depends on the situation).\"  The goal is to manage the anxiety so baseline goes to \"low (3-5).\"    Objective #A Client will track when he gets anxious due to non-social situations and report on his anxiety, the context of his anxiety, and the thoughts that accompany it.    Status: New - Date: 12/22/20     Patient will identify his fears / thoughts that contribute to feeling anxious.    Intervention(s)  Therapist will assign homework as appropriate  provide educational materials on anxiety (vs paranoia)  teach assertiveness skills. To contact friends as opposed to always waiting for others to initiate contact with him.      Patient has reviewed and agreed to the above plan.      Michael Mckeon  February 16, 2021  "

## 2021-03-08 ENCOUNTER — VIRTUAL VISIT (OUTPATIENT)
Dept: PSYCHOLOGY | Facility: CLINIC | Age: 26
End: 2021-03-08
Payer: COMMERCIAL

## 2021-03-08 DIAGNOSIS — F90.2 ATTENTION DEFICIT HYPERACTIVITY DISORDER, COMBINED TYPE: ICD-10-CM

## 2021-03-08 DIAGNOSIS — F40.10 SOCIAL ANXIETY DISORDER: Primary | ICD-10-CM

## 2021-03-08 PROCEDURE — 90834 PSYTX W PT 45 MINUTES: CPT | Mod: 95 | Performed by: PSYCHOLOGIST

## 2021-03-08 NOTE — PROGRESS NOTES
Progress Note    Patient Name: Denver Brewer  Date: 3/8/21         Service Type: Individual      Session Start Time: 2:08pm  Session End Time: 2:49pm     Session Length: 41 minutes    Session #: 9    Attendees: Client attended alone    Service Modality:  Video Visit:      Provider verified identity through the following two step process.  Patient provided:  Patient photo    Telemedicine Visit: The patient's condition can be safely assessed and treated via synchronous audio and visual telemedicine encounter.      Reason for Telemedicine Visit: Services only offered telehealth    Originating Site (Patient Location): Patient's home    Distant Site (Provider Location): Provider Remote Setting    Consent:  The patient/guardian has verbally consented to: the potential risks and benefits of telemedicine (video visit) versus in person care; bill my insurance or make self-payment for services provided; and responsibility for payment of non-covered services.     Patient would like the video invitation sent by: Send to e-mail at: [email or web address]    Mode of Communication:  Video Conference via Amwell    As the provider I attest to compliance with applicable laws and regulations related to telemedicine.     Treatment Plan Last Reviewed: Started collaborating on this session  PHQ-9 / RAINER-7 : 6 / 8 (on 12/3/20)    DATA  Interactive Complexity: No  Crisis: No       Progress Since Last Session (Related to Symptoms / Goals / Homework):   Symptoms: : Client discussed that his sleep schedule is off due to not working and staying up late.    Homework: Completed          Episode of Care Goals: Satisfactory progress - ACTION (Actively working towards change); Intervened by reinforcing change plan / affirming steps taken     Current / Ongoing Stressors and Concerns:   Unemployed; COVID-19; quarantine     Treatment Objective(s) Addressed in This Session:   comply with medication 100% of  "the time  learn & utilize at least one assertive communication skills weekly  Discussed Pros & Cons of taking ADHD meds more often     Meds for ADHD e/o day helps w/productivity; however, leads to dry mouth and neck ache.  Client taking Ritalin  Rec chiro and maybe a medication check  Talked to his parents directly about how their phone calls \"cause me distress.\"  \"When I'm medicated I get more articulate.\"  Medication allows him to get household chores done.         Intervention:   CBT: Psychoeducation, Made recommendations; clarifying questions; open-ended questions; reframing; assigned homework; pattern recognition; Pros & Cons  Motivational Interviewing: Highlighted benefits of taking medications to provide more understanding to client regarding taking medications more frequently  Emapthy, understanding, and other rapport building skills        ASSESSMENT: Current Emotional / Mental Status (status of significant symptoms):   Risk status (Self / Other harm or suicidal ideation)   Patient denies current fears or concerns for personal safety.   Patient denies current or recent suicidal ideation or behaviors.   Patient denies current or recent homicidal ideation or behaviors.   Patient denies current or recent self injurious behavior or ideation.   Patient denies other safety concerns.   Patient reports there has been no change in risk factors since their last session.     Patient reports there has been no change in protective factors since their last session.     Recommended that patient call 911 or go to the local ED should there be a change in any of these risk factors.     Appearance:   Appropriate    Eye Contact:   Poor   Psychomotor Behavior: Normal    Attitude:   Cooperative    Orientation:   All   Speech    Rate / Production: Normal/ Responsive Normal     Volume:  Normal    Mood:    Euthymic   Affect:    Appropriate    Thought Content:  Clear    Thought Form:  Coherent  Logical    Insight:    Fair " "     Medication Review:   No changes to current psychiatric medication(s)     Medication Compliance:   Yes     Changes in Health Issues:   None reported     Chemical Use Review:   Substance Use: Chemical use reviewed, no active concerns identified      Tobacco Use: No current tobacco use.      Diagnosis:  1. Social anxiety disorder    2. Attention deficit hyperactivity disorder, combined type        Collateral Reports Completed:   Not Applicable    PLAN: (Patient Tasks / Therapist Tasks / Other)  Client will follow-up with his prescribing mental health provider to discuss the pain he feels in his neck when he takes his Ritalin.  He will have a conversation regarding options.  He may also follow-up on a chiropractor who was recommended to client at his request.  His next scheduled appointment is on March 15, 2021.        Michael Mckeon                                                         ______________________________________________________________________    Treatment Plan    Patient's Name: Denver Brewer  YOB: 1995    Date: 12/22/20    DSM5 Diagnoses: 300.23 (F40.10) Social Anxiety Disorder  Psychosocial / Contextual Factors: COVID-19ghislaine  WHODAS: 28 (on 12/3/20)    Referral / Collaboration:  Referral to another professional/service is not indicated at this time..    Anticipated number of session or this episode of care: 15      MeasurableTreatment Goal(s) related to diagnosis / functional impairment(s)  Goal 1: Patient will \"stop having those feelings (anxiety) when those situations come up (social).\"    I will know I've met my goal when I stop feeling anxious in general.  Baseline anxiety in social situations is \"high (7-9) with 10 being highest and it depends on the situation).\"  The goal is to manage the anxiety so baseline goes to a \"low (3-5).\"     Objective #A Client will track when he gets anxious due to social situations and report on his anxiety, the context of his " "anxiety, and the thoughts that accompany it.    Patient will identify his fears / thoughts that contribute to feeling anxious.  Status: New - Date: 12/22/20     Intervention(s)  Therapist will assign homework as appropriate  role-play effective communication skills  teach about social skills.      Goal 2: Patient will \"stop having those feelings (anxiety) when those situations come up (social).\"    I will know I've met my goal when I stop feeling anxious in general.  Baseline anxiety in non-social situations is \"high (7-9) with 10 being highest and it depends on the situation).\"  The goal is to manage the anxiety so baseline goes to \"low (3-5).\"    Objective #A Client will track when he gets anxious due to non-social situations and report on his anxiety, the context of his anxiety, and the thoughts that accompany it.    Status: New - Date: 12/22/20     Patient will identify his fears / thoughts that contribute to feeling anxious.    Intervention(s)  Therapist will assign homework as appropriate  provide educational materials on anxiety (vs paranoia)  teach assertiveness skills. To contact friends as opposed to always waiting for others to initiate contact with him.      Patient has reviewed and agreed to the above plan.      Michael Mckeon  March 8, 2021  "

## 2021-03-15 ENCOUNTER — VIRTUAL VISIT (OUTPATIENT)
Dept: PSYCHOLOGY | Facility: CLINIC | Age: 26
End: 2021-03-15
Payer: COMMERCIAL

## 2021-03-15 DIAGNOSIS — F90.2 ATTENTION DEFICIT HYPERACTIVITY DISORDER, COMBINED TYPE: ICD-10-CM

## 2021-03-15 DIAGNOSIS — F40.10 SOCIAL ANXIETY DISORDER: Primary | ICD-10-CM

## 2021-03-15 PROCEDURE — 90834 PSYTX W PT 45 MINUTES: CPT | Mod: 95 | Performed by: PSYCHOLOGIST

## 2021-03-15 NOTE — PROGRESS NOTES
"                                           Progress Note    Patient Name: Denver Brewer  Date: 3/15/21         Service Type: Individual      Session Start Time: 1:10pm  Session End Time: 1:56pm     Session Length: 46 minutes    Session #: 10    Attendees: Client attended alone    Service Modality:  Video Visit:      Provider verified identity through the following two step process.  Patient provided:  Patient photo    Telemedicine Visit: The patient's condition can be safely assessed and treated via synchronous audio and visual telemedicine encounter.      Reason for Telemedicine Visit: Services only offered telehealth    Originating Site (Patient Location): Patient's home    Distant Site (Provider Location): Provider Remote Setting    Consent:  The patient/guardian has verbally consented to: the potential risks and benefits of telemedicine (video visit) versus in person care; bill my insurance or make self-payment for services provided; and responsibility for payment of non-covered services.     Patient would like the video invitation sent by: Send to e-mail at: [email or web address]    Mode of Communication:  Video Conference via Amwell    As the provider I attest to compliance with applicable laws and regulations related to telemedicine.     Treatment Plan Last Reviewed: Started collaborating on this session  PHQ-9 / RAINER-7 : 6 / 8 (on 12/3/20)    DATA  Interactive Complexity: No  Crisis: No       Progress Since Last Session (Related to Symptoms / Goals / Homework):   Symptoms: No change : \"I'm doing ok.\"    Homework: Partially Completed          Episode of Care Goals: Satisfactory progress - ACTION (Actively working towards change); Intervened by reinforcing change plan / affirming steps taken     Current / Ongoing Stressors and Concerns:   Unemployed; COVID-19; quarantine; relational issues     Treatment Objective(s) Addressed in This Session:   learn & utilize at least one assertive communication skills " "weekly  Discuss being assertive with parents to set boundaries; taught tools to manage emotions in order to communicate more clearly; discussed affects of culture on familiy conflict       Notes from 3/15/21 Session:  Started looking for a job  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  Their calls just lower my self-esteem.  I can't express myself well in the heat of the moment.  Right away I lose my ability to think rationally (with his parents).  Culturally - the only culture I hang on to from [place] is the food and eating with chopsticks.  His parents \"hang onto a lot of [place] culture.  They don't believe in mental health.  They think all video games will rot your brain.  They always call me fat, and overweight, and I'm not that fat.\"  Talked about taking a Timeout with his parents.  Started gaining weight \"just before the pandemic.\"  Challenge the word \"should,\" \"that's my specialty, beating myself up.\"  Challenging negative self-talk.  Setting boundaries and \"giving them something\" to meet your request(s).       Intervention:   CBT: Psychoeducation on communication styles, getting requests met, values, and setting boundaries;  clarifying questions; open-ended questions; reframing; assigned homework; pattern recognition; role-modeled setting boundaries  Empathy and other rapport building skills        ASSESSMENT: Current Emotional / Mental Status (status of significant symptoms):   Risk status (Self / Other harm or suicidal ideation)   Patient denies current fears or concerns for personal safety.   Patient denies current or recent suicidal ideation or behaviors.   Patient denies current or recent homicidal ideation or behaviors.   Patient denies current or recent self injurious behavior or ideation.   Patient denies other safety concerns.   Patient reports there has been no change in risk factors since their last session.     Patient reports there has been no change in " "protective factors since their last session.     Recommended that patient call 911 or go to the local ED should there be a change in any of these risk factors.     Appearance:   Appropriate    Eye Contact:   Poor   Psychomotor Behavior: Normal    Attitude:   Cooperative    Orientation:   All   Speech    Rate / Production: Normal/ Responsive Normal     Volume:  Normal    Mood:    Euthymic   Affect:    Appropriate    Thought Content:  Clear    Thought Form:  Coherent  Logical    Insight:    Fair      Medication Review:   No changes to current psychiatric medication(s)     Medication Compliance:   Yes     Changes in Health Issues:   None reported     Chemical Use Review:   Substance Use: Chemical use reviewed, no active concerns identified      Tobacco Use: No current tobacco use.      Diagnosis:  1. Social anxiety disorder    2. Attention deficit hyperactivity disorder, combined type        Collateral Reports Completed:   Not Applicable    PLAN: (Patient Tasks / Therapist Tasks / Other)  Client will discuss boundaries with his parents and use a skills discussed today by telling his parents what is in it for them if they abide by his request (only call weekly and do not always criticize).  His next scheduled appointment is on March 22, 2021.        Michael Mckeon                                                         ______________________________________________________________________    Treatment Plan    Patient's Name: Denver Brewer  YOB: 1995    Date: 12/22/20    DSM5 Diagnoses: 300.23 (F40.10) Social Anxiety Disorder  Psychosocial / Contextual Factors: COVID-ghislaine Snow  WHODAS: 28 (on 12/3/20)    Referral / Collaboration:  Referral to another professional/service is not indicated at this time..    Anticipated number of session or this episode of care: 15      MeasurableTreatment Goal(s) related to diagnosis / functional impairment(s)  Goal 1: Patient will \"stop having those feelings " "(anxiety) when those situations come up (social).\"    I will know I've met my goal when I stop feeling anxious in general.  Baseline anxiety in social situations is \"high (7-9) with 10 being highest and it depends on the situation).\"  The goal is to manage the anxiety so baseline goes to a \"low (3-5).\"     Objective #A Client will track when he gets anxious due to social situations and report on his anxiety, the context of his anxiety, and the thoughts that accompany it.    Patient will identify his fears / thoughts that contribute to feeling anxious.  Status: New - Date: 12/22/20     Intervention(s)  Therapist will assign homework as appropriate  role-play effective communication skills  teach about social skills.      Goal 2: Patient will \"stop having those feelings (anxiety) when those situations come up (social).\"    I will know I've met my goal when I stop feeling anxious in general.  Baseline anxiety in non-social situations is \"high (7-9) with 10 being highest and it depends on the situation).\"  The goal is to manage the anxiety so baseline goes to \"low (3-5).\"    Objective #A Client will track when he gets anxious due to non-social situations and report on his anxiety, the context of his anxiety, and the thoughts that accompany it.    Status: New - Date: 12/22/20     Patient will identify his fears / thoughts that contribute to feeling anxious.    Intervention(s)  Therapist will assign homework as appropriate  provide educational materials on anxiety (vs paranoia)  teach assertiveness skills. To contact friends as opposed to always waiting for others to initiate contact with him.      Patient has reviewed and agreed to the above plan.      Michael Mckeon  March 15, 2021  "

## 2021-03-18 ENCOUNTER — MYC REFILL (OUTPATIENT)
Dept: FAMILY MEDICINE | Facility: CLINIC | Age: 26
End: 2021-03-18

## 2021-03-18 DIAGNOSIS — F90.9 ATTENTION DEFICIT HYPERACTIVITY DISORDER (ADHD), UNSPECIFIED ADHD TYPE: ICD-10-CM

## 2021-03-18 NOTE — TELEPHONE ENCOUNTER
Controlled Substance Refill Request for   Methylphenidate 20 mg  Problem List Complete:  No     PROVIDER TO CONSIDER COMPLETION OF PROBLEM LIST AND OVERVIEW/CONTROLLED SUBSTANCE AGREEMENT    Last Written Prescription Date:  6-1-20  Last Fill Quantity: 60,   # refills: 0    Last Office Visit with [facility] primary care provider: 3-15-21    Future Office visit:     Controlled substance agreement:   Encounter-Level CSA:    There are no encounter-level csa.     Patient-Level CSA:    There are no patient-level csa.         Last Urine Drug Screen: No results found for: CDAUT, No results found for: COMDAT, No results found for: THC13, PCP13, COC13, MAMP13, OPI13, AMP13, BZO13, TCA13, MTD13, BAR13, OXY13, PPX13, BUP13     Processing:  Rx to be electronically transmitted to pharmacy by provider      [email or web address]       checked in past 3 months?  Yes checked on 3-18-21- anabel Philip RN

## 2021-03-19 RX ORDER — METHYLPHENIDATE HYDROCHLORIDE 20 MG/1
20 TABLET ORAL 2 TIMES DAILY PRN
Qty: 30 TABLET | Refills: 0 | Status: SHIPPED | OUTPATIENT
Start: 2021-03-19 | End: 2021-08-20

## 2021-03-22 ENCOUNTER — VIRTUAL VISIT (OUTPATIENT)
Dept: PSYCHOLOGY | Facility: CLINIC | Age: 26
End: 2021-03-22
Payer: COMMERCIAL

## 2021-03-22 DIAGNOSIS — F90.2 ATTENTION DEFICIT HYPERACTIVITY DISORDER, COMBINED TYPE: ICD-10-CM

## 2021-03-22 DIAGNOSIS — F40.10 SOCIAL ANXIETY DISORDER: Primary | ICD-10-CM

## 2021-03-22 PROCEDURE — 90834 PSYTX W PT 45 MINUTES: CPT | Mod: 95 | Performed by: PSYCHOLOGIST

## 2021-03-22 NOTE — PROGRESS NOTES
"                                           Progress Note    Patient Name: Denver Brewer  Date: 3/22/21         Service Type: Individual      Session Start Time: 1:09pm  Session End Time: 1:59pm     Session Length: 50 minutes    Session #: 11    Attendees: Client attended alone    Service Modality:  Video Visit:      Provider verified identity through the following two step process.  Patient provided:  Patient photo    Telemedicine Visit: The patient's condition can be safely assessed and treated via synchronous audio and visual telemedicine encounter.      Reason for Telemedicine Visit: Services only offered telehealth    Originating Site (Patient Location): Patient's home    Distant Site (Provider Location): Provider Remote Setting    Consent:  The patient/guardian has verbally consented to: the potential risks and benefits of telemedicine (video visit) versus in person care; bill my insurance or make self-payment for services provided; and responsibility for payment of non-covered services.     Patient would like the video invitation sent by: Send to e-mail at: [email or web address]    Mode of Communication:  Video Conference via Amwell    As the provider I attest to compliance with applicable laws and regulations related to telemedicine.     Treatment Plan Last Reviewed: Started collaborating on this session  PHQ-9 / RAINER-7 : 6 / 8 (on 12/3/20)    DATA  Interactive Complexity: No  Crisis: No       Progress Since Last Session (Related to Symptoms / Goals / Homework):   Symptoms: No change : \"I'm doing alright, I guess.  I have a phone screening interview lined up.\"    Homework: Partially Completed          Episode of Care Goals: Satisfactory progress - ACTION (Actively working towards change); Intervened by reinforcing change plan / affirming steps taken     Current / Ongoing Stressors and Concerns:   Unemployed; COVID-19; quarantine; relational issues     Treatment Objective(s) Addressed in This " "Session:   learn & utilize at least one assertive communication skills weekly  Discussed interview strategies; getting angry with his parents (automatic thoughts, interpretations); took history of \"control\" in his life by his parents and never receiving praise.  How to manage anger (for instance, reframing parent's comments as \"they're just trying to be helpful\" as opposed to \"they're trying to control me\").         Intervention:   CBT: Automatic thought identification; challenging automatic thoughts by reframing; psychoeducation on communication styles, getting requests met, values, and setting boundaries;  clarifying questions; open-ended questions; reframing; assigned homework; pattern recognition; role-modeled setting boundaries  Empathy and other rapport building skills        ASSESSMENT: Current Emotional / Mental Status (status of significant symptoms):   Risk status (Self / Other harm or suicidal ideation)   Patient denies current fears or concerns for personal safety.   Patient denies current or recent suicidal ideation or behaviors.   Patient denies current or recent homicidal ideation or behaviors.   Patient denies current or recent self injurious behavior or ideation.   Patient denies other safety concerns.   Patient reports there has been no change in risk factors since their last session.     Patient reports there has been no change in protective factors since their last session.     Recommended that patient call 911 or go to the local ED should there be a change in any of these risk factors.     Appearance:   Appropriate    Eye Contact:   Poor   Psychomotor Behavior: Normal    Attitude:   Cooperative    Orientation:   All   Speech    Rate / Production: Normal/ Responsive Normal     Volume:  Normal    Mood:    Euthymic   Affect:    Appropriate    Thought Content:  Clear    Thought Form:  Coherent  Logical    Insight:    Fair      Medication Review:   No changes to current psychiatric " "medication(s)     Medication Compliance:   Yes     Changes in Health Issues:   None reported     Chemical Use Review:   Substance Use: Chemical use reviewed, no active concerns identified      Tobacco Use: No current tobacco use.      Diagnosis:  1. Social anxiety disorder    2. Attention deficit hyperactivity disorder, combined type        Collateral Reports Completed:   Not Applicable    PLAN: (Patient Tasks / Therapist Tasks / Other)  Client will discuss boundaries with his parents and use a skills discussed in previous weeks to be assertive.  He will also try to \"choose\" not to get angry when talking to his parents by reframing their comments as, \"They're only trying to help and not control me.\"  He will discuss reframing in more detail at the next session.  His next scheduled appointment is on April 5, 2021.        Michael Mckeon                                                         ______________________________________________________________________    Treatment Plan    Patient's Name: Denver Brewer  YOB: 1995    Date: 12/22/20    DSM5 Diagnoses: 300.23 (F40.10) Social Anxiety Disorder  Psychosocial / Contextual Factors: COVID-19ghislaine  WHODAS: 28 (on 12/3/20)    Referral / Collaboration:  Referral to another professional/service is not indicated at this time..    Anticipated number of session or this episode of care: 15      MeasurableTreatment Goal(s) related to diagnosis / functional impairment(s)  Goal 1: Patient will \"stop having those feelings (anxiety) when those situations come up (social).\"    I will know I've met my goal when I stop feeling anxious in general.  Baseline anxiety in social situations is \"high (7-9) with 10 being highest and it depends on the situation).\"  The goal is to manage the anxiety so baseline goes to a \"low (3-5).\"     Objective #A Client will track when he gets anxious due to social situations and report on his anxiety, the context of his " "anxiety, and the thoughts that accompany it.    Patient will identify his fears / thoughts that contribute to feeling anxious.  Status: New - Date: 12/22/20     Intervention(s)  Therapist will assign homework as appropriate  role-play effective communication skills  teach about social skills.      Goal 2: Patient will \"stop having those feelings (anxiety) when those situations come up (social).\"    I will know I've met my goal when I stop feeling anxious in general.  Baseline anxiety in non-social situations is \"high (7-9) with 10 being highest and it depends on the situation).\"  The goal is to manage the anxiety so baseline goes to \"low (3-5).\"    Objective #A Client will track when he gets anxious due to non-social situations and report on his anxiety, the context of his anxiety, and the thoughts that accompany it.    Status: New - Date: 12/22/20     Patient will identify his fears / thoughts that contribute to feeling anxious.    Intervention(s)  Therapist will assign homework as appropriate  provide educational materials on anxiety (vs paranoia)  teach assertiveness skills. To contact friends as opposed to always waiting for others to initiate contact with him.      Patient has reviewed and agreed to the above plan.      Michael Mckeon  March 22, 2021  "

## 2021-04-10 ENCOUNTER — IMMUNIZATION (OUTPATIENT)
Dept: NURSING | Facility: CLINIC | Age: 26
End: 2021-04-10
Payer: COMMERCIAL

## 2021-04-10 PROCEDURE — 91301 PR COVID VAC MODERNA 100 MCG/0.5 ML IM: CPT

## 2021-04-10 PROCEDURE — 0011A PR COVID VAC MODERNA 100 MCG/0.5 ML IM: CPT

## 2021-04-16 ENCOUNTER — VIRTUAL VISIT (OUTPATIENT)
Dept: PSYCHOLOGY | Facility: CLINIC | Age: 26
End: 2021-04-16
Payer: COMMERCIAL

## 2021-04-16 DIAGNOSIS — F90.2 ATTENTION DEFICIT HYPERACTIVITY DISORDER, COMBINED TYPE: ICD-10-CM

## 2021-04-16 DIAGNOSIS — F40.10 SOCIAL ANXIETY DISORDER: Primary | ICD-10-CM

## 2021-04-16 PROCEDURE — 90834 PSYTX W PT 45 MINUTES: CPT | Mod: GT | Performed by: PSYCHOLOGIST

## 2021-04-16 NOTE — PROGRESS NOTES
Progress Note    Patient Name: Denver Brewer  Date: 4/16/21         Service Type: Individual      Session Start Time: 10:04am  Session End Time: 10:54am     Session Length: 50 minutes    Session #: 12    Attendees: Client attended alone    Service Modality:  Video Visit:      Provider verified identity through the following two step process.  Patient provided:  Patient photo    Telemedicine Visit: The patient's condition can be safely assessed and treated via synchronous audio and visual telemedicine encounter.      Reason for Telemedicine Visit: Services only offered telehealth    Originating Site (Patient Location): Patient's home    Distant Site (Provider Location): Provider Remote Setting    Consent:  The patient/guardian has verbally consented to: the potential risks and benefits of telemedicine (video visit) versus in person care; bill my insurance or make self-payment for services provided; and responsibility for payment of non-covered services.     Patient would like the video invitation sent by: Send to e-mail at: [email or web address]    Mode of Communication:  Video Conference via Amwell    As the provider I attest to compliance with applicable laws and regulations related to telemedicine.     Treatment Plan Last Reviewed: Started collaborating on this session  PHQ-9 / RAINER-7 : 6 / 8 (on 12/3/20)    DATA  Interactive Complexity: No  Crisis: No       Progress Since Last Session (Related to Symptoms / Goals / Homework):   Symptoms: : Stable    Homework: Partially Completed          Episode of Care Goals: Minimal progress - PREPARATION (Decided to change - considering how); Intervened by negotiating a change plan and determining options / strategies for behavior change, identifying triggers, exploring social supports, and working towards setting a date to begin behavior change     Current / Ongoing Stressors and Concerns:   Unemployed; COVID-19; quarantine;  "relational issues     Treatment Objective(s) Addressed in This Session:   learn & utilize at least one assertive communication skills weekly  Client discussed vocational goals and paths to reaching goals; he was also help to reframe his thinking that he's \"wasted his whole life and it's getting too late to change it.\"        Notes from 4/16/21 Session:  \"Sorry I've missed the last couple appts.  My sleep schedule has been messed up.  I miss my meds sometimes.\"  Worked on reframing; got his viewpoints on his parents intent on contacting him each day.  \"Everything they suggest seems like nagging.  It just feels like they're trying to control me.  They obviously don't want me to be homeless.\"  \"My dad especially is the worst.  He keeps telling me what to do.\"  He always feels critiqued.  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  \"I'm not too stressed about it now [not working]. It's gets worse taking my ADHD meds.  The meds also give me really bad neck pains.  It could be from getting dehydrated.  I get headaches from it.\"  Gets second vaccine shot May 8th.  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  Asked him is schooling would help, \"Yes, there are some certificates you can get.\"  Automatic thoughts: \"If I can't help them right away, they're disappointed in me.\"  Helped to challenge.  Communication styles: using validation  Also, wants to get a girlfriend.       Intervention:   CBT: Reframing; Automatic thought identification; challenging automatic thoughts; Attempted to reframe communication with his parents; clarifying questions; open-ended questions; " reframing; assigned homework; pattern recognition; role-modeled setting boundaries  Empathy and other rapport building skills        ASSESSMENT: Current Emotional / Mental Status (status of significant symptoms):   Risk status (Self / Other harm or suicidal ideation)   Patient denies current fears or concerns for personal safety.   Patient denies current or recent suicidal ideation or behaviors.   Patient denies current or recent homicidal ideation or behaviors.   Patient denies current or recent self injurious behavior or ideation.   Patient denies other safety concerns.   Patient reports there has been no change in risk factors since their last session.     Patient reports there has been no change in protective factors since their last session.     Recommended that patient call 911 or go to the local ED should there be a change in any of these risk factors.     Appearance:   Appropriate    Eye Contact:   Poor   Psychomotor Behavior: Normal    Attitude:   Cooperative    Orientation:   All   Speech    Rate / Production: Normal/ Responsive Normal     Volume:  Normal    Mood:    Euthymic   Affect:    Appropriate    Thought Content:  Clear    Thought Form:  Coherent  Logical    Insight:    Fair      Medication Review:   No changes to current psychiatric medication(s)     Medication Compliance:   Yes     Changes in Health Issues:   None reported     Chemical Use Review:   Substance Use: Chemical use reviewed, no active concerns identified      Tobacco Use: No current tobacco use.      Diagnosis:  1. Social anxiety disorder    2. Attention deficit hyperactivity disorder, combined type        Collateral Reports Completed:   Not Applicable    PLAN: (Patient Tasks / Therapist Tasks / Other)  Client will challenge his catastrophizing regarding finding a career path.  Also, approximately 10 minutes before interviewing it was recommended he get himself into a positive mood.  His next scheduled appointment is on April 26,  "2021.        Michael Mckeon                                                         ______________________________________________________________________    Treatment Plan    Patient's Name: Denver Brewer  YOB: 1995    Date: 12/22/20    DSM5 Diagnoses: 300.23 (F40.10) Social Anxiety Disorder  Psychosocial / Contextual Factors: COVID-19, ghislaine  WHODAS: 28 (on 12/3/20)    Referral / Collaboration:  Referral to another professional/service is not indicated at this time..    Anticipated number of session or this episode of care: 15      MeasurableTreatment Goal(s) related to diagnosis / functional impairment(s)  Goal 1: Patient will \"stop having those feelings (anxiety) when those situations come up (social).\"    I will know I've met my goal when I stop feeling anxious in general.  Baseline anxiety in social situations is \"high (7-9) with 10 being highest and it depends on the situation).\"  The goal is to manage the anxiety so baseline goes to a \"low (3-5).\"     Objective #A Client will track when he gets anxious due to social situations and report on his anxiety, the context of his anxiety, and the thoughts that accompany it.    Patient will identify his fears / thoughts that contribute to feeling anxious.  Status: New - Date: 12/22/20     Intervention(s)  Therapist will assign homework as appropriate  role-play effective communication skills  teach about social skills.      Goal 2: Patient will \"stop having those feelings (anxiety) when those situations come up (social).\"    I will know I've met my goal when I stop feeling anxious in general.  Baseline anxiety in non-social situations is \"high (7-9) with 10 being highest and it depends on the situation).\"  The goal is to manage the anxiety so baseline goes to \"low (3-5).\"    Objective #A Client will track when he gets anxious due to non-social situations and report on his anxiety, the context of his anxiety, and the thoughts that accompany " it.    Status: New - Date: 12/22/20     Patient will identify his fears / thoughts that contribute to feeling anxious.    Intervention(s)  Therapist will assign homework as appropriate  provide educational materials on anxiety (vs paranoia)  teach assertiveness skills. To contact friends as opposed to always waiting for others to initiate contact with him.      Patient has reviewed and agreed to the above plan.      Michael Mckeon  April 16, 2021

## 2021-04-26 ENCOUNTER — VIRTUAL VISIT (OUTPATIENT)
Dept: PSYCHOLOGY | Facility: CLINIC | Age: 26
End: 2021-04-26
Payer: COMMERCIAL

## 2021-04-26 DIAGNOSIS — F90.2 ATTENTION DEFICIT HYPERACTIVITY DISORDER, COMBINED TYPE: ICD-10-CM

## 2021-04-26 DIAGNOSIS — F40.10 SOCIAL ANXIETY DISORDER: Primary | ICD-10-CM

## 2021-04-26 PROCEDURE — 90834 PSYTX W PT 45 MINUTES: CPT | Mod: 95 | Performed by: PSYCHOLOGIST

## 2021-04-26 NOTE — PROGRESS NOTES
"                                           Progress Note    Patient Name: Denver Brewer  Date: 4/26/21         Service Type: Individual      Session Start Time: 3:00pm  Session End Time: 3:52pm     Session Length: 52 minutes    Session #: 13    Attendees: Client attended alone    Service Modality:  Video Visit:      Provider verified identity through the following two step process.  Patient provided:  Patient photo    Telemedicine Visit: The patient's condition can be safely assessed and treated via synchronous audio and visual telemedicine encounter.      Reason for Telemedicine Visit: Services only offered telehealth    Originating Site (Patient Location): Patient's home    Distant Site (Provider Location): Provider Remote Setting    Consent:  The patient/guardian has verbally consented to: the potential risks and benefits of telemedicine (video visit) versus in person care; bill my insurance or make self-payment for services provided; and responsibility for payment of non-covered services.     Patient would like the video invitation sent by: Send to e-mail at: [email or web address]    Mode of Communication:  Video Conference via [employer]    As the provider I attest to compliance with applicable laws and regulations related to telemedicine.     Treatment Plan Last Reviewed: Started collaborating on this session  PHQ-9 / RAINER-7 : 6 / 8 (on 12/3/20)    DATA  Interactive Complexity: No  Crisis: No       Progress Since Last Session (Related to Symptoms / Goals / Homework):   Symptoms: : Stable - \"I'm doing ok.\"    Homework: Partially Completed          Episode of Care Goals: Minimal progress - PREPARATION (Decided to change - considering how); Intervened by negotiating a change plan and determining options / strategies for behavior change, identifying triggers, exploring social supports, and working towards setting a date to begin behavior change     Current / Ongoing Stressors and Concerns:   Unemployed \"and my " "unemployment to run out\"; COVID-19; quarantine; relational issues; job interviews     Treatment Objective(s) Addressed in This Session:   Client discussed the affects taking his ADHD medication has on his emotional experience.  Client discussed wanting to expand his social network; possible pathways were discussed along with communication skills.  Client discussed vocational goals.         Notes from 4/26/21 Session:  Client stated he took his meds prior to session, \"This is the first time I've taken my meds the same day we met.\"  He stated that his meds \"allow me to feel my emotions more so I won't be so apathetic.\"  Recognized with him he does display much affect, humor, depression. - this can be a deterrent to taking meds  \"Video games make me too happy and are too good of an escape.\"  Discussed process vs event for getting a job and having a girlfriend  He did download a dating darrian  Talked about communication skills, meeting people, dating       Intervention:   CBT: Reframing; Automatic thought identification; Educated on communication skills and building healthy relationships; clarifying questions; open-ended questions; assigned homework; role-modeled communication styles  Empathy and other rapport building skills; normalized social anxiety when meeting someone new        ASSESSMENT: Current Emotional / Mental Status (status of significant symptoms):   Risk status (Self / Other harm or suicidal ideation)   Patient denies current fears or concerns for personal safety.   Patient denies current or recent suicidal ideation or behaviors.   Patient denies current or recent homicidal ideation or behaviors.   Patient denies current or recent self injurious behavior or ideation.   Patient denies other safety concerns.   Patient reports there has been no change in risk factors since their last session.     Patient reports there has been no change in protective factors since their last session.     Recommended that " "patient call 911 or go to the local ED should there be a change in any of these risk factors.     Appearance:   Appropriate    Eye Contact:   Poor   Psychomotor Behavior: Normal    Attitude:   Cooperative    Orientation:   All   Speech    Rate / Production: Normal/ Responsive Normal     Volume:  Normal    Mood:    Euthymic   Affect:    Appropriate    Thought Content:  Clear    Thought Form:  Coherent  Logical    Insight:    Fair      Medication Review:   No changes to current psychiatric medication(s)     Medication Compliance:   Yes     Changes in Health Issues:   None reported     Chemical Use Review:   Substance Use: Chemical use reviewed, no active concerns identified      Tobacco Use: No current tobacco use.      Diagnosis:  1. Social anxiety disorder    2. Attention deficit hyperactivity disorder, combined type        Collateral Reports Completed:   Not Applicable    PLAN: (Patient Tasks / Therapist Tasks / Other)  Client will observe physical feelings and \"automatic thoughts\" that accompany the thought of reaching out to someone he does not know online.  His next scheduled appointment is on May 3, 2021.        Michael Mckeon                                                         ______________________________________________________________________    Treatment Plan    Patient's Name: Denver Brewer  YOB: 1995    Date: 12/22/20    DSM5 Diagnoses: 300.23 (F40.10) Social Anxiety Disorder  Psychosocial / Contextual Factors: COVID-19ghislaine  WHODAS: 28 (on 12/3/20)    Referral / Collaboration:  Referral to another professional/service is not indicated at this time..    Anticipated number of session or this episode of care: 15      MeasurableTreatment Goal(s) related to diagnosis / functional impairment(s)  Goal 1: Patient will \"stop having those feelings (anxiety) when those situations come up (social).\"    I will know I've met my goal when I stop feeling anxious in general.  Baseline " "anxiety in social situations is \"high (7-9) with 10 being highest and it depends on the situation).\"  The goal is to manage the anxiety so baseline goes to a \"low (3-5).\"     Objective #A Client will track when he gets anxious due to social situations and report on his anxiety, the context of his anxiety, and the thoughts that accompany it.    Patient will identify his fears / thoughts that contribute to feeling anxious.  Status: New - Date: 12/22/20     Intervention(s)  Therapist will assign homework as appropriate  role-play effective communication skills  teach about social skills.      Goal 2: Patient will \"stop having those feelings (anxiety) when those situations come up (social).\"    I will know I've met my goal when I stop feeling anxious in general.  Baseline anxiety in non-social situations is \"high (7-9) with 10 being highest and it depends on the situation).\"  The goal is to manage the anxiety so baseline goes to \"low (3-5).\"    Objective #A Client will track when he gets anxious due to non-social situations and report on his anxiety, the context of his anxiety, and the thoughts that accompany it.    Status: New - Date: 12/22/20     Patient will identify his fears / thoughts that contribute to feeling anxious.    Intervention(s)  Therapist will assign homework as appropriate  provide educational materials on anxiety (vs paranoia)  teach assertiveness skills. To contact friends as opposed to always waiting for others to initiate contact with him.      Patient has reviewed and agreed to the above plan.      Michael Mckeon  April 26, 2021  "

## 2021-05-03 ENCOUNTER — VIRTUAL VISIT (OUTPATIENT)
Dept: PSYCHOLOGY | Facility: CLINIC | Age: 26
End: 2021-05-03
Payer: COMMERCIAL

## 2021-05-03 DIAGNOSIS — F40.10 SOCIAL ANXIETY DISORDER: Primary | ICD-10-CM

## 2021-05-03 DIAGNOSIS — F90.2 ATTENTION DEFICIT HYPERACTIVITY DISORDER, COMBINED TYPE: ICD-10-CM

## 2021-05-03 PROCEDURE — 90834 PSYTX W PT 45 MINUTES: CPT | Mod: 95 | Performed by: PSYCHOLOGIST

## 2021-05-08 ENCOUNTER — IMMUNIZATION (OUTPATIENT)
Dept: NURSING | Facility: CLINIC | Age: 26
End: 2021-05-08
Attending: INTERNAL MEDICINE
Payer: COMMERCIAL

## 2021-05-08 PROCEDURE — 0012A PR COVID VAC MODERNA 100 MCG/0.5 ML IM: CPT

## 2021-05-08 PROCEDURE — 91301 PR COVID VAC MODERNA 100 MCG/0.5 ML IM: CPT

## 2021-05-19 ENCOUNTER — VIRTUAL VISIT (OUTPATIENT)
Dept: PSYCHOLOGY | Facility: CLINIC | Age: 26
End: 2021-05-19
Payer: COMMERCIAL

## 2021-05-19 DIAGNOSIS — F40.10 SOCIAL ANXIETY DISORDER: Primary | ICD-10-CM

## 2021-05-19 DIAGNOSIS — F90.2 ATTENTION DEFICIT HYPERACTIVITY DISORDER, COMBINED TYPE: ICD-10-CM

## 2021-05-19 PROCEDURE — 90834 PSYTX W PT 45 MINUTES: CPT | Mod: 95 | Performed by: PSYCHOLOGIST

## 2021-05-19 NOTE — PROGRESS NOTES
"                                           Progress Note    Patient Name: Denver Brewer  Date: 5/19/21         Service Type: Individual      Session Start Time: 3:03pm  Session End Time: 3:55pm     Session Length: 52 minutes    Session #: 15    Attendees: Client attended alone    Service Modality:  Video Visit:      Provider verified identity through the following two step process.  Patient provided:  Patient photo    Telemedicine Visit: The patient's condition can be safely assessed and treated via synchronous audio and visual telemedicine encounter.      Reason for Telemedicine Visit: Services only offered telehealth    Originating Site (Patient Location): Patient's home    Distant Site (Provider Location): Provider Remote Setting    Consent:  The patient/guardian has verbally consented to: the potential risks and benefits of telemedicine (video visit) versus in person care; bill my insurance or make self-payment for services provided; and responsibility for payment of non-covered services.     Patient would like the video invitation sent by: Send to e-mail at: [email or web address]    Mode of Communication:  Video Conference via [employer]well    As the provider I attest to compliance with applicable laws and regulations related to telemedicine.     Treatment Plan Last Reviewed: Started collaborating on this session  PHQ-9 / RAINER-7 : 6 / 8 (on 12/3/20)    DATA  Interactive Complexity: No  Crisis: No       Progress Since Last Session (Related to Symptoms / Goals / Homework):   Symptoms: Improving : \"Client stated he is getting more done so I'm less sad about stuff.\"    Homework: Partially Completed          Episode of Care Goals: Satisfactory progress - ACTION (Actively working towards change); Intervened by reinforcing change plan / affirming steps taken     Current / Ongoing Stressors and Concerns:   Client has an in-person job interview     Treatment Objective(s) Addressed in This Session:   Client discussed being " "anxious about upcoming interview; helped client prepare for interview; discussed boundaries with companies  Discussed sleep hygiene; talked about client's lack of motivation to be active outside yet        Notes from 5/19/21 Session:  Client has been interviewing and applying for jobs more.  Getting more done around the house.  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  Helped client prepare for interview       Intervention:   CBT: Reframing; Automatic thoughts identification; Role-modeled communication skills and building healthy relationships; clarifying questions; open-ended questions; assigned homework; helped identify skills  Helped client practice and prepare for job interview; educated him on sleep hygiene; Rapport building skills        ASSESSMENT: Current Emotional / Mental Status (status of significant symptoms):   Risk status (Self / Other harm or suicidal ideation)   Patient denies current fears or concerns for personal safety.   Patient denies current or recent suicidal ideation or behaviors.   Patient denies current or recent homicidal ideation or behaviors.   Patient denies current or recent self injurious behavior or ideation.   Patient denies other safety concerns.   Patient reports there has been no change in risk factors since their last session.     Patient reports there has been no change in protective factors since their last session.     Recommended that patient call 911 or go to the local ED should there be a change in any of these risk factors.     Appearance:   Appropriate    Eye Contact:   Poor   Psychomotor Behavior: Normal    Attitude:   Cooperative    Orientation:   All   Speech    Rate / " "Production: Normal/ Responsive Normal     Volume:  Normal    Mood:    Euthymic   Affect:    Appropriate    Thought Content:  Clear    Thought Form:  Coherent  Logical    Insight:    Fair      Medication Review:   No changes to current psychiatric medication(s)     Medication Compliance:   Yes     Changes in Health Issues:   None reported     Chemical Use Review:   Substance Use: Chemical use reviewed, no active concerns identified      Tobacco Use: No current tobacco use.      Diagnosis:  1. Social anxiety disorder    2. Attention deficit hyperactivity disorder, combined type        Collateral Reports Completed:   Not Applicable    PLAN: (Patient Tasks / Therapist Tasks / Other)  Client will continue to apply for jobs.  He will use today's preparations to attend a job interview on Friday.  He will come to the next session prepared to discuss what went well and what could have been improved at his job interview.  His next scheduled appointment is on May 24, 2021.        Michael Mckeon                                                         ______________________________________________________________________    Treatment Plan    Patient's Name: Denver Brewer  YOB: 1995    Date: 12/22/20    DSM5 Diagnoses: 300.23 (F40.10) Social Anxiety Disorder  Psychosocial / Contextual Factors: COVID-19ghislaine  WHODAS: 28 (on 12/3/20)    Referral / Collaboration:  Referral to another professional/service is not indicated at this time..    Anticipated number of session or this episode of care: 15      MeasurableTreatment Goal(s) related to diagnosis / functional impairment(s)  Goal 1: Patient will \"stop having those feelings (anxiety) when those situations come up (social).\"    I will know I've met my goal when I stop feeling anxious in general.  Baseline anxiety in social situations is \"high (7-9) with 10 being highest and it depends on the situation).\"  The goal is to manage the anxiety so baseline " "goes to a \"low (3-5).\"     Objective #A Client will track when he gets anxious due to social situations and report on his anxiety, the context of his anxiety, and the thoughts that accompany it.    Patient will identify his fears / thoughts that contribute to feeling anxious.  Status: New - Date: 12/22/20     Intervention(s)  Therapist will assign homework as appropriate  role-play effective communication skills  teach about social skills.      Goal 2: Patient will \"stop having those feelings (anxiety) when those situations come up (social).\"    I will know I've met my goal when I stop feeling anxious in general.  Baseline anxiety in non-social situations is \"high (7-9) with 10 being highest and it depends on the situation).\"  The goal is to manage the anxiety so baseline goes to \"low (3-5).\"    Objective #A Client will track when he gets anxious due to non-social situations and report on his anxiety, the context of his anxiety, and the thoughts that accompany it.    Status: New - Date: 12/22/20     Patient will identify his fears / thoughts that contribute to feeling anxious.    Intervention(s)  Therapist will assign homework as appropriate  provide educational materials on anxiety (vs paranoia)  teach assertiveness skills. To contact friends as opposed to always waiting for others to initiate contact with him.      Patient has reviewed and agreed to the above plan.      Michael Mckeon  May 19, 2021  "

## 2021-05-24 ENCOUNTER — VIRTUAL VISIT (OUTPATIENT)
Dept: PSYCHOLOGY | Facility: CLINIC | Age: 26
End: 2021-05-24
Payer: COMMERCIAL

## 2021-05-24 DIAGNOSIS — F90.2 ATTENTION DEFICIT HYPERACTIVITY DISORDER, COMBINED TYPE: ICD-10-CM

## 2021-05-24 DIAGNOSIS — F40.10 SOCIAL ANXIETY DISORDER: Primary | ICD-10-CM

## 2021-05-24 PROCEDURE — 90834 PSYTX W PT 45 MINUTES: CPT | Mod: GT | Performed by: PSYCHOLOGIST

## 2021-05-24 NOTE — PROGRESS NOTES
Progress Note    Patient Name: Denver Brewer  Date: 5/24/21         Service Type: Individual      Session Start Time: 3:03pm  Session End Time: 3:55pm     Session Length: 52 minutes    Session #: 16    Attendees: Client attended alone    Service Modality:  Video Visit:      Provider verified identity through the following two step process.  Patient provided:  Patient photo    Telemedicine Visit: The patient's condition can be safely assessed and treated via synchronous audio and visual telemedicine encounter.      Reason for Telemedicine Visit: Services only offered telehealth    Originating Site (Patient Location): Patient's home    Distant Site (Provider Location): Provider Remote Setting    Consent:  The patient/guardian has verbally consented to: the potential risks and benefits of telemedicine (video visit) versus in person care; bill my insurance or make self-payment for services provided; and responsibility for payment of non-covered services.     Patient would like the video invitation sent by: Send to e-mail at: [email or web address]    Mode of Communication:  Video Conference via Amwell    As the provider I attest to compliance with applicable laws and regulations related to telemedicine.     Treatment Plan Last Reviewed: 12/22/20; Revised: 5/24/21  PHQ-9 / RAINER-7 : 6 / 8 (on 12/3/20)    DATA  Interactive Complexity: No  Crisis: No       Progress Since Last Session (Related to Symptoms / Goals / Homework):   Symptoms: No change Stable: Client was offered a job today.    Homework: Partially Completed          Episode of Care Goals: Satisfactory progress - ACTION (Actively working towards change); Intervened by reinforcing change plan / affirming steps taken     Current / Ongoing Stressors and Concerns:   Client was offered a job today     Treatment Objective(s) Addressed in This Session:   identify at least his triggers for anxiety  Discussed the client getting  "a job offer today; reviewed and revised Treatment Plan goals; discussed homework; explored social difficulties        Notes from 5/26/21 Session:  He got a job offer today, \"A little mixed feelings.\"  Trying to work on sleep routine now that he has a job offer  He has not officially accepted yet but \"I'm sure I'll take it.\"  \"Bittersweet.\"  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  His ADHD manifests as not being able to focus, concentrate, distractions, I get overtly friendly that it annoys others, I don't have boundaries and say things I don't need to say, I don't really think about what I'll say, I love to gossip way too much.  \"When others don't contact me I think they don't like me.  I end up not contacting them back.  I shouldn't bother them. I just feel sad.\"  Challenged the automatic thought of \"I can't just text and invite someone to lunch.\"  =  \"It would be awkward for me to ask someone to lunch and I don't like feeling awkward.\"  \"I'm aware of my own problems but I don't know why I can't do anything about it.\"  Taught about taking a non-judgmental stance.  \"My self-confidence is next to nothing.\"   Why?  \"I want to blame my parents.  Growing up, they always compared me and my brother to their friend's kids.\"  \"There's nothing good about me.\"  Helped challenge this thought.  \"Getting over the social phobia will help me reach my goals (money, job, friends, girlfriend).\"       Intervention:   CBT: Reframing; Automatic thoughts identification and helped challenge; clarifying, open-ended, and Socratic questioning; assigned homework; helped identify skills to manage negative self-talk  Active listening, validation, and other rapport building skills        ASSESSMENT: Current Emotional / Mental Status (status of " significant symptoms):   Risk status (Self / Other harm or suicidal ideation)   Patient denies current fears or concerns for personal safety.   Patient denies current or recent suicidal ideation or behaviors.   Patient denies current or recent homicidal ideation or behaviors.   Patient denies current or recent self injurious behavior or ideation.   Patient denies other safety concerns.   Patient reports there has been no change in risk factors since their last session.     Patient reports there has been no change in protective factors since their last session.     Recommended that patient call 911 or go to the local ED should there be a change in any of these risk factors.     Appearance:   Appropriate    Eye Contact:   Poor   Psychomotor Behavior: Normal    Attitude:   Cooperative    Orientation:   All   Speech    Rate / Production: Normal/ Responsive Normal     Volume:  Normal    Mood:    Euthymic   Affect:    Appropriate    Thought Content:  Clear    Thought Form:  Coherent  Logical    Insight:    Fair      Medication Review:   No changes to current psychiatric medication(s)     Medication Compliance:   Yes     Changes in Health Issues:   None reported     Chemical Use Review:   Substance Use: Chemical use reviewed, no active concerns identified      Tobacco Use: No current tobacco use.      Diagnosis:  1. Social anxiety disorder    2. Attention deficit hyperactivity disorder, combined type        Collateral Reports Completed:   Not Applicable    PLAN: (Patient Tasks / Therapist Tasks / Other)  Client will practice Social skills by activating the talk feature when is engaged in playing video games.  He will also track what helped him talk when he does talk or what was the barrier(s) to talking.  His next scheduled appointment is on June 7, 2021.        Micheal Mckeon                                                         ______________________________________________________________________    Treatment  "Plan    Patient's Name: Denver Brewer  YOB: 1995    Date: 12/22/20    DSM5 Diagnoses: 300.23 (F40.10) Social Anxiety Disorder  Psychosocial / Contextual Factors: COVID-19, ghislaine  WHODAS: 28 (on 12/3/20)    Referral / Collaboration:  Referral to another professional/service is not indicated at this time..    Anticipated number of session or this episode of care: 15      MeasurableTreatment Goal(s) related to diagnosis / functional impairment(s)  Goal 1: Patient will \"stop having those feelings (anxiety) when those situations come up (social).\"    I will know I've met my goal when I stop feeling anxious in general.  Baseline anxiety in social situations is \"high (7-9) with 10 being highest and it depends on the situation).\"  The goal is to manage the anxiety so baseline goes to a \"low (3-5).\"     Objective #A Client will track when he gets anxious due to social situations and report on his anxiety, the context of his anxiety, and the thoughts that accompany it.  Client will engage in conversation while playing video games to practice social skills.    Patient will identify his fears / thoughts that contribute to feeling anxious.  Status: New - Date: 5/24/21     Intervention(s)  Therapist will assign homework as appropriate  role-play effective communication skills  teach about social skills.      Goal 2: Patient will \"stop having those feelings (anxiety) when those situations come up (social).\"    I will know I've met my goal when I stop feeling anxious and sad in general.  Baseline anxiety in non-social situations is \"high (7-9) with 10 being highest and it depends on the situation).\"  The goal is to manage the anxiety so baseline goes to \"low (3-5).\"    Objective #A Client will track when he gets anxious due to non-social situations and report on his anxiety, the context of his anxiety, and the thoughts that accompany it.    Status: New - Date: 12/22/20 ; Reviewed: 5/24/21    Patient will " identify his fears / thoughts that contribute to feeling anxious.    Intervention(s)  Therapist will assign homework as appropriate  provide educational materials on anxiety (vs paranoia)  teach assertiveness skills. To contact friends as opposed to always waiting for others to initiate contact with him.      Patient has reviewed and agreed to the above plan.      Michael Mckeon  May 24, 2021

## 2021-06-07 ENCOUNTER — VIRTUAL VISIT (OUTPATIENT)
Dept: PSYCHOLOGY | Facility: CLINIC | Age: 26
End: 2021-06-07
Payer: COMMERCIAL

## 2021-06-07 DIAGNOSIS — F90.2 ATTENTION DEFICIT HYPERACTIVITY DISORDER, COMBINED TYPE: ICD-10-CM

## 2021-06-07 DIAGNOSIS — F40.10 SOCIAL ANXIETY DISORDER: Primary | ICD-10-CM

## 2021-06-07 PROCEDURE — 90834 PSYTX W PT 45 MINUTES: CPT | Mod: GT | Performed by: PSYCHOLOGIST

## 2021-06-07 NOTE — PROGRESS NOTES
Progress Note    Patient Name: Denver Brewer  Date: 6/7/21         Service Type: Individual      Session Start Time: 2:03pm  Session End Time: 2:55pm     Session Length: 52 minutes    Session #: 17    Attendees: Client attended alone    Service Modality:  Video Visit:      Provider verified identity through the following two step process.  Patient provided:  Patient photo    Telemedicine Visit: The patient's condition can be safely assessed and treated via synchronous audio and visual telemedicine encounter.      Reason for Telemedicine Visit: Services only offered telehealth    Originating Site (Patient Location): Patient's home    Distant Site (Provider Location): Provider Remote Setting    Consent:  The patient/guardian has verbally consented to: the potential risks and benefits of telemedicine (video visit) versus in person care; bill my insurance or make self-payment for services provided; and responsibility for payment of non-covered services.     Patient would like the video invitation sent by: Send to e-mail at: [email or web address]    Mode of Communication:  Video Conference via Amwell    As the provider I attest to compliance with applicable laws and regulations related to telemedicine.     Treatment Plan Last Reviewed: 12/22/20; Revised: 5/24/21  PHQ-9 / RAINER-7 : 6 / 8 (on 12/3/20)    DATA  Interactive Complexity: No  Crisis: No       Progress Since Last Session (Related to Symptoms / Goals / Homework):   Symptoms: No change Stable: Client was offered a job today.    Homework: Partially Completed          Episode of Care Goals: Satisfactory progress - ACTION (Actively working towards change); Intervened by reinforcing change plan / affirming steps taken     Current / Ongoing Stressors and Concerns:   Client was offered a job today     Treatment Objective(s) Addressed in This Session:   Discussed progression of friendship(s); update on client's new job and his  "schedule as it relates to future sessions; discussed making new friends and the negative self-talk that creates barriers to reaching goals.  Helped challenge inaccurate thoughts.        Notes from 6/7/21 Session:  Took call at work (new job)  Training from 9-6, lunch at 1  Working night shift from 12am-9am  Second week on the job  Tired, going to bed late still \"but at least it's not 5:00(am).  \"At first I was sad no one was talking to me but now they are.\"  \"I'm not unhappy but I think I'll still be lonely (no friends coming out of work).\"  Educated on automatic thoughts and provided his previous examples  Identified cognitive distortions.  Client has not played any video games in which he can talk to others  Challenge the thoughts of feeling lonely and not having a girlfriend.  How can you challenge the negative thinking: \"I've got some friends.  I'm young and have time to make friends. \"  Challenged client's thinking that \"I'm a loser.  Society says I am because I haven't had a girlfriend by my age.\"       Intervention:   CBT: Educated on and taught communcation skills; Reframing; Helped re-educate, identify; Identified and recommended skills to challenge Cognitive Distortions (Catastrophizing, Future Telling); clarifying, open-ended, and Socratic questioning; assigned homework  Active listening, validation, and other rapport building skills        ASSESSMENT: Current Emotional / Mental Status (status of significant symptoms):   Risk status (Self / Other harm or suicidal ideation)   Patient denies current fears or concerns for personal safety.   Patient denies current or recent suicidal ideation or behaviors.   Patient denies current or recent homicidal ideation or behaviors.   Patient denies current or recent self injurious behavior or ideation.   Patient denies other safety concerns.   Patient reports there has been no change in risk factors since their last session.     Patient reports there has been no change " in protective factors since their last session.     Recommended that patient call 911 or go to the local ED should there be a change in any of these risk factors.     Appearance:   Appropriate    Eye Contact:   Poor   Psychomotor Behavior: Normal    Attitude:   Cooperative    Orientation:   All   Speech    Rate / Production: Normal/ Responsive Normal     Volume:  Normal    Mood:    Euthymic   Affect:    Appropriate    Thought Content:  Clear    Thought Form:  Coherent  Logical    Insight:    Fair      Medication Review:   No changes to current psychiatric medication(s)     Medication Compliance:   Yes     Changes in Health Issues:   None reported     Chemical Use Review:   Substance Use: Chemical use reviewed, no active concerns identified      Tobacco Use: No current tobacco use.      Diagnosis:  1. Social anxiety disorder    2. Attention deficit hyperactivity disorder, combined type        Collateral Reports Completed:   Not Applicable    PLAN: (Patient Tasks / Therapist Tasks / Other)  Client will practice catching Cognitive Distortions and Automatic Thoughts, writing them down, and challenging them.  He will present his list, or discuss the process, at the next session.  He will also socialize more with his new colleagues at work.  His next scheduled appointment is on June 28, 2021.        Michael Mckeon                                                         ______________________________________________________________________    Treatment Plan    Patient's Name: Denver Brewer  YOB: 1995    Date: 12/22/20    DSM5 Diagnoses: 300.23 (F40.10) Social Anxiety Disorder  Psychosocial / Contextual Factors: COVID-ghislaine Snow  WHODAS: 28 (on 12/3/20)    Referral / Collaboration:  Referral to another professional/service is not indicated at this time..    Anticipated number of session or this episode of care: 15      MeasurableTreatment Goal(s) related to diagnosis / functional  "impairment(s)  Goal 1: Patient will \"stop having those feelings (anxiety) when those situations come up (social).\"    I will know I've met my goal when I stop feeling anxious in general.  Baseline anxiety in social situations is \"high (7-9) with 10 being highest and it depends on the situation).\"  The goal is to manage the anxiety so baseline goes to a \"low (3-5).\"     Objective #A Client will track when he gets anxious due to social situations and report on his anxiety, the context of his anxiety, and the thoughts that accompany it.  Client will engage in conversation while playing video games to practice social skills.    Patient will identify his fears / thoughts that contribute to feeling anxious.  Status: New - Date: 5/24/21     Intervention(s)  Therapist will assign homework as appropriate  role-play effective communication skills  teach about social skills.      Goal 2: Patient will \"stop having those feelings (anxiety) when those situations come up (social).\"    I will know I've met my goal when I stop feeling anxious and sad in general.  Baseline anxiety in non-social situations is \"high (7-9) with 10 being highest and it depends on the situation).\"  The goal is to manage the anxiety so baseline goes to \"low (3-5).\"    Objective #A Client will track when he gets anxious due to non-social situations and report on his anxiety, the context of his anxiety, and the thoughts that accompany it.    Status: New - Date: 12/22/20 ; Reviewed: 5/24/21    Patient will identify his fears / thoughts that contribute to feeling anxious.    Intervention(s)  Therapist will assign homework as appropriate  provide educational materials on anxiety (vs paranoia)  teach assertiveness skills. To contact friends as opposed to always waiting for others to initiate contact with him.      Patient has reviewed and agreed to the above plan.      Michael Mckeon  June 6, 2021  "

## 2021-08-03 ENCOUNTER — VIRTUAL VISIT (OUTPATIENT)
Dept: PSYCHOLOGY | Facility: CLINIC | Age: 26
End: 2021-08-03
Payer: COMMERCIAL

## 2021-08-03 DIAGNOSIS — F90.2 ATTENTION DEFICIT HYPERACTIVITY DISORDER, COMBINED TYPE: ICD-10-CM

## 2021-08-03 DIAGNOSIS — F40.10 SOCIAL ANXIETY DISORDER: Primary | ICD-10-CM

## 2021-08-03 PROCEDURE — 90834 PSYTX W PT 45 MINUTES: CPT | Mod: GT | Performed by: PSYCHOLOGIST

## 2021-08-03 NOTE — PROGRESS NOTES
Progress Note    Patient Name: Denver Brewer  Date: 8/3/21         Service Type: Individual      Session Start Time: 11:03am  Session End Time: 11:47am     Session Length: 44 minutes    Session #: 18    Attendees: Client attended alone    Service Modality:  Video Visit:      Provider verified identity through the following two step process.  Patient provided:  Patient photo    Telemedicine Visit: The patient's condition can be safely assessed and treated via synchronous audio and visual telemedicine encounter.      Reason for Telemedicine Visit: Services only offered telehealth    Originating Site (Patient Location): Patient's home    Distant Site (Provider Location): Provider Remote Setting    Consent:  The patient/guardian has verbally consented to: the potential risks and benefits of telemedicine (video visit) versus in person care; bill my insurance or make self-payment for services provided; and responsibility for payment of non-covered services.     Patient would like the video invitation sent by: Send to e-mail at: [email or web address]    Mode of Communication:  Video Conference via Amwell    As the provider I attest to compliance with applicable laws and regulations related to telemedicine.     Treatment Plan Last Reviewed: 12/22/20; Revised: 5/24/21  PHQ-9 / RAINER-7 : 6 / 8 (on 12/3/20)    DATA  Interactive Complexity: No  Crisis: No       Progress Since Last Session (Related to Symptoms / Goals / Homework):   Symptoms: Improving : Stable    Homework: Partially Completed          Episode of Care Goals: Satisfactory progress - ACTION (Actively working towards change); Intervened by reinforcing change plan / affirming steps taken     Current / Ongoing Stressors and Concerns:   Working full time (new job)     Treatment Objective(s) Addressed in This Session:   use relaxation strategies three times per day to reduce the physical symptoms of anxiety  Identified  "triggers to social anxiety; explained treatment and heirarchy of anxiety provoking situations; updated on work      Notes from 8/3/21 Session:  Client still working 12pm - 9pm, \"And I stayed up all day yesterday playing video games.\"  He also has a  - parents initiated.  \"I've got to be working out on my own when the sessions are over.\"  Has gym in his apartment  Challenging social anxiety to overcome it  How is work?  \"It's getting...it's not too bad..it's pretty easy.\"  Plans on staying with his current company for about a year.  Address today: \" I'm kind of hoping to work on my anxiety now.\"  How are you with relaxation techniques? \"I guess I'm never really done them before.\"  Explored what he has done for anxiety in the past - for example, \"It helps to practice interviewing.\"  \"The most difficult thing is inviting people to do things.\"  \"I'm still having trouble reaching out to complete strangers.\"  \"I remember I get anxiety when I'm just sitting there and I'm not getting anything done.\"  \"One thing is looking at my stats for the day and thinking my numbers aren't very high.\" - Enc cl to approach his boss.    HW: Practice Box Breathing     Intervention:   CBT: Educated on managing social anxiety; Taught Box Breathing; Clarified client's goal for anxiety; Assigned homework  Used clarifying and open-ended questions, active listening, other rapport building skills        ASSESSMENT: Current Emotional / Mental Status (status of significant symptoms):   Risk status (Self / Other harm or suicidal ideation)   Patient denies current fears or concerns for personal safety.   Patient denies current or recent suicidal ideation or behaviors.   Patient denies current or recent homicidal ideation or behaviors.   Patient denies current or recent self injurious behavior or ideation.   Patient denies other safety concerns.   Patient reports there has been no change in risk factors since their last session.  " "   Patient reports there has been no change in protective factors since their last session.     Recommended that patient call 911 or go to the local ED should there be a change in any of these risk factors.     Appearance:   Appropriate    Eye Contact:   Poor   Psychomotor Behavior: Normal    Attitude:   Cooperative    Orientation:   All   Speech    Rate / Production: Normal/ Responsive Normal     Volume:  Normal    Mood:    Euthymic   Affect:    Appropriate    Thought Content:  Clear    Thought Form:  Coherent  Logical    Insight:    Fair      Medication Review:   No changes to current psychiatric medication(s)     Medication Compliance:   Yes     Changes in Health Issues:   None reported     Chemical Use Review:   Substance Use: Chemical use reviewed, no active concerns identified      Tobacco Use: No current tobacco use.      Diagnosis:  1. Social anxiety disorder    2. Attention deficit hyperactivity disorder, combined type        Collateral Reports Completed:   Not Applicable    PLAN: (Patient Tasks / Therapist Tasks / Other)  Client will practice Box Breathing three times (minimum) per day to help manage anxiety.  His next scheduled appointment is on August 20, 2021.        Michael Mckeon                                                         ______________________________________________________________________    Treatment Plan    Patient's Name: Denver Brewer  YOB: 1995    Date: 12/22/20    DSM5 Diagnoses: 300.23 (F40.10) Social Anxiety Disorder  Psychosocial / Contextual Factors: COVID-19ghislaine  WHODAS: 28 (on 12/3/20)    Referral / Collaboration:  Referral to another professional/service is not indicated at this time..    Anticipated number of session or this episode of care: 15      MeasurableTreatment Goal(s) related to diagnosis / functional impairment(s)  Goal 1: Patient will \"stop having those feelings (anxiety) when those situations come up (social).\"    I will know " "I've met my goal when I stop feeling anxious in general.  Baseline anxiety in social situations is \"high (7-9) with 10 being highest and it depends on the situation).\"  The goal is to manage the anxiety so baseline goes to a \"low (3-5).\"     Objective #A Client will track when he gets anxious due to social situations and report on his anxiety, the context of his anxiety, and the thoughts that accompany it.  Client will engage in conversation while playing video games to practice social skills.    Patient will identify his fears / thoughts that contribute to feeling anxious.  Status: New - Date: 5/24/21     Intervention(s)  Therapist will assign homework as appropriate  role-play effective communication skills  teach about social skills.      Goal 2: Patient will \"stop having those feelings (anxiety) when those situations come up (social).\"    I will know I've met my goal when I stop feeling anxious and sad in general.  Baseline anxiety in non-social situations is \"high (7-9) with 10 being highest and it depends on the situation).\"  The goal is to manage the anxiety so baseline goes to \"low (3-5).\"    Objective #A Client will track when he gets anxious due to non-social situations and report on his anxiety, the context of his anxiety, and the thoughts that accompany it.    Status: New - Date: 12/22/20 ; Reviewed: 5/24/21    Patient will identify his fears / thoughts that contribute to feeling anxious.    Intervention(s)  Therapist will assign homework as appropriate  provide educational materials on anxiety (vs paranoia)  teach assertiveness skills. To contact friends as opposed to always waiting for others to initiate contact with him.      Patient has reviewed and agreed to the above plan.      Michael Mckeon  August 3, 2021  "

## 2021-08-07 ENCOUNTER — MYC REFILL (OUTPATIENT)
Dept: FAMILY MEDICINE | Facility: CLINIC | Age: 26
End: 2021-08-07

## 2021-08-07 DIAGNOSIS — F90.9 ATTENTION DEFICIT HYPERACTIVITY DISORDER (ADHD), UNSPECIFIED ADHD TYPE: ICD-10-CM

## 2021-08-09 NOTE — TELEPHONE ENCOUNTER
Methylphenidate 20 mg       Last Written Prescription Date:  3/19/21  Last Fill Quantity: 30,   # refills: 0  Last Office Visit: 10/27/20 Arnol  Future Office visit:       Routing refill request to provider for review/approval because:  Drug not on the [facility], [facility] or  Health refill protocol or controlled substance    [place] reviewed:  reviewed- no concerns.    only sees the 1 refill on 3/19/21 for Dr. Ambrose.    [place]  [email or web address]    Sent message via my chart asking pt if psychology is filling medication.    Marizol PEREZ RN  EP Triage

## 2021-08-10 NOTE — TELEPHONE ENCOUNTER
Please have the patient schedule a virtual appointment with me in the next 1 to 2 weeks for recheck on ADHD.  Refill on hold for now    Jacob Abmrose MD  [facility], [place]

## 2021-08-20 ENCOUNTER — VIRTUAL VISIT (OUTPATIENT)
Dept: FAMILY MEDICINE | Facility: CLINIC | Age: 26
End: 2021-08-20
Payer: COMMERCIAL

## 2021-08-20 DIAGNOSIS — F90.9 ATTENTION DEFICIT HYPERACTIVITY DISORDER (ADHD), UNSPECIFIED ADHD TYPE: Primary | ICD-10-CM

## 2021-08-20 PROCEDURE — 99213 OFFICE O/P EST LOW 20 MIN: CPT | Mod: GT | Performed by: FAMILY MEDICINE

## 2021-08-20 RX ORDER — METHYLPHENIDATE HYDROCHLORIDE 20 MG/1
20 TABLET ORAL 2 TIMES DAILY PRN
Qty: 30 TABLET | Refills: 0 | Status: CANCELLED | OUTPATIENT
Start: 2021-08-20

## 2021-08-20 RX ORDER — DEXTROAMPHETAMINE SACCHARATE, AMPHETAMINE ASPARTATE, DEXTROAMPHETAMINE SULFATE AND AMPHETAMINE SULFATE 5; 5; 5; 5 MG/1; MG/1; MG/1; MG/1
20 TABLET ORAL DAILY
Qty: 15 TABLET | Refills: 0 | Status: SHIPPED | OUTPATIENT
Start: 2021-08-20 | End: 2021-09-07

## 2021-08-20 NOTE — TELEPHONE ENCOUNTER
Per chart review, patient had virtual visit with Dr. Ambrose today and medications were changed.    Rx denied due to change in mediation therapy per today's office visit note.

## 2021-08-20 NOTE — PROGRESS NOTES
"Or Frank is a 26 year old who is being evaluated via a billable video visit.      How would you like to obtain your AVS? MyChart  If the video visit is dropped, the invitation should be resent by: Text to cell phone: [phone]  Will anyone else be joining your video visit? No    Video Start Time: 11:19 AM    Assessment & Plan     Attention deficit hyperactivity disorder (ADHD), unspecified ADHD type  Patient has previously been on Ritalin which causes feeling of depression after it wears off in the evening.  It helps with stimulation enough that he is able to get through his work.  He typically takes the medication at noon.    Recommending switching from Ritalin to Adderall to 20 mg daily.  He plans to take it only on the weekdays.  15 tablets ordered to try.  If any side effects noted or if it is not working we can adjust the dose or the medication accordingly.  Patient verbalized understanding and agreement    - amphetamine-dextroamphetamine (ADDERALL) 20 MG tablet; Take 1 tablet (20 mg) by mouth daily    BMI:   Estimated body mass index is 26.95 kg/m  as calculated from the following:    Height as of 10/27/20: 1.676 m (5' 6\").    Weight as of 10/27/20: 75.8 kg (167 lb).     Return in about 6 months (around 2/20/2022) for Attention Deficit Disorder Recheck.    Jacob Ambrose MD  [facility]    Subjective   Or Frank is a 26 year old who presents for the following health issues     HPI     Patient is here regarding ADHD medication (Ritalin)  - feels that we may want to try a different medication  - usual side effects: loss of appetite  - taking as prescribed.        Review of Systems   CONSTITUTIONAL: NEGATIVE for fever, chills, change in weight  ENT/MOUTH: NEGATIVE for ear, mouth and throat problems  RESP: NEGATIVE for significant cough or SOB  CV: NEGATIVE for chest pain, palpitations or peripheral edema      Objective           Vitals:  No vitals were obtained today due to virtual " visit.    Physical Exam   GENERAL: Healthy, alert and no distress  EYES: Eyes grossly normal to inspection.  No discharge or erythema, or obvious scleral/conjunctival abnormalities.  RESP: No audible wheeze, cough, or visible cyanosis.  No visible retractions or increased work of breathing.    SKIN: Visible skin clear. No significant rash, abnormal pigmentation or lesions.  NEURO: Cranial nerves grossly intact.  Mentation and speech appropriate for age.  PSYCH: Mentation appears normal, affect normal/bright, judgement and insight intact, normal speech and appearance well-groomed.                Video-Visit Details    Type of service:  Video Visit    Video End Time:11:27 AM    Originating Location (pt. Location): Home    Distant Location (provider location):  [facility]     Platform used for Video Visit: [email or web address]

## 2021-10-05 ENCOUNTER — MYC REFILL (OUTPATIENT)
Dept: FAMILY MEDICINE | Facility: CLINIC | Age: 26
End: 2021-10-05

## 2021-10-05 DIAGNOSIS — F90.9 ATTENTION DEFICIT HYPERACTIVITY DISORDER (ADHD), UNSPECIFIED ADHD TYPE: ICD-10-CM

## 2021-10-05 RX ORDER — DEXTROAMPHETAMINE SACCHARATE, AMPHETAMINE ASPARTATE, DEXTROAMPHETAMINE SULFATE AND AMPHETAMINE SULFATE 5; 5; 5; 5 MG/1; MG/1; MG/1; MG/1
20 TABLET ORAL DAILY
Qty: 30 TABLET | Refills: 0 | Status: CANCELLED | OUTPATIENT
Start: 2021-10-05

## 2021-11-22 ENCOUNTER — VIRTUAL VISIT (OUTPATIENT)
Dept: FAMILY MEDICINE | Facility: CLINIC | Age: 26
End: 2021-11-22
Payer: COMMERCIAL

## 2021-11-22 DIAGNOSIS — L08.9 LOCAL INFECTION OF SKIN AND SUBCUTANEOUS TISSUE: Primary | ICD-10-CM

## 2021-11-22 DIAGNOSIS — F39 MOOD DISORDER (H): ICD-10-CM

## 2021-11-22 PROCEDURE — 99213 OFFICE O/P EST LOW 20 MIN: CPT | Mod: 95 | Performed by: FAMILY MEDICINE

## 2021-11-22 RX ORDER — MUPIROCIN 20 MG/G
OINTMENT TOPICAL 2 TIMES DAILY
Qty: 30 G | Refills: 0 | Status: SHIPPED | OUTPATIENT
Start: 2021-11-22 | End: 2022-02-21

## 2021-11-22 RX ORDER — DOXYCYCLINE 100 MG/1
100 CAPSULE ORAL 2 TIMES DAILY
Qty: 20 CAPSULE | Refills: 0 | Status: SHIPPED | OUTPATIENT
Start: 2021-11-22 | End: 2022-02-21

## 2021-11-22 NOTE — PROGRESS NOTES
Frank is a 26 year old who is being evaluated via a billable video visit.      How would you like to obtain your AVS? MyChart  If the video visit is dropped, the invitation should be resent by: Text to cell phone: [phone]  Will anyone else be joining your video visit? No      Video Start Time: 3:00    Assessment & Plan     Local infection of skin and subcutaneous tissue  On waist and groin with red/swelling/tender to touch, bleeding present as well  Has been for 1 week, had once about 1 year ago,   Will have him to start abx  Encouraged him to consider I&D if continue flaring   - doxycycline hyclate (VIBRAMYCIN) 100 MG capsule; Take 1 capsule (100 mg) by mouth 2 times daily  - mupirocin (BACTROBAN) 2 % external ointment; Apply topically 2 times daily         FUTURE APPOINTMENTS:       - Follow-up visit prn     No follow-ups on file.    Alejandro Warner MD  [facility]   Frank is a 26 year old who presents for the following health issues     HPI     Possible Cyst in the lower abdomen  Recurring cyst over the past 12+ months. Painful, swollen, red, drainage. Right below belly button, where his pant waistband sits.   Has not had it looked at previously.      Review of Systems   Constitutional, HEENT, cardiovascular, pulmonary, gi and gu systems are negative, except as otherwise noted.      Objective           Vitals:  No vitals were obtained today due to virtual visit.    Physical Exam   GENERAL: Healthy, alert and no distress  EYES: Eyes grossly normal to inspection.  No discharge or erythema, or obvious scleral/conjunctival abnormalities.  RESP: No audible wheeze, cough, or visible cyanosis.  No visible retractions or increased work of breathing.    SKIN: Visible skin clear. No significant rash, abnormal pigmentation or lesions.  NEURO: Cranial nerves grossly intact.  Mentation and speech appropriate for age.  PSYCH: Mentation appears normal, affect normal/bright, judgement and  insight intact, normal speech and appearance well-groomed.              Video-Visit Details    Type of service:  Video Visit    Video End Time:3:20 PM    Originating Location (pt. Location): Home    Distant Location (provider location):  [facility]     Platform used for Video Visit: [employer]

## 2021-12-16 ENCOUNTER — MYC REFILL (OUTPATIENT)
Dept: FAMILY MEDICINE | Facility: CLINIC | Age: 26
End: 2021-12-16
Payer: COMMERCIAL

## 2021-12-16 DIAGNOSIS — F90.9 ATTENTION DEFICIT HYPERACTIVITY DISORDER (ADHD), UNSPECIFIED ADHD TYPE: ICD-10-CM

## 2021-12-17 RX ORDER — DEXTROAMPHETAMINE SACCHARATE, AMPHETAMINE ASPARTATE, DEXTROAMPHETAMINE SULFATE AND AMPHETAMINE SULFATE 5; 5; 5; 5 MG/1; MG/1; MG/1; MG/1
20 TABLET ORAL DAILY
Qty: 30 TABLET | Refills: 0 | Status: SHIPPED | OUTPATIENT
Start: 2022-01-14 | End: 2022-05-27

## 2021-12-17 RX ORDER — DEXTROAMPHETAMINE SACCHARATE, AMPHETAMINE ASPARTATE, DEXTROAMPHETAMINE SULFATE AND AMPHETAMINE SULFATE 5; 5; 5; 5 MG/1; MG/1; MG/1; MG/1
20 TABLET ORAL DAILY
Qty: 30 TABLET | Refills: 0 | Status: SHIPPED | OUTPATIENT
Start: 2022-02-11 | End: 2022-08-06

## 2021-12-17 RX ORDER — DEXTROAMPHETAMINE SACCHARATE, AMPHETAMINE ASPARTATE, DEXTROAMPHETAMINE SULFATE AND AMPHETAMINE SULFATE 5; 5; 5; 5 MG/1; MG/1; MG/1; MG/1
20 TABLET ORAL DAILY
Qty: 30 TABLET | Refills: 0 | Status: SHIPPED | OUTPATIENT
Start: 2021-12-17 | End: 2022-05-27

## 2021-12-17 NOTE — TELEPHONE ENCOUNTER
Amphetamine/dextroamphetamine 20 mg      Last Written Prescription Date:  10/4/21  Last Fill Quantity: 30,   # refills: 0  Last Office Visit: 11/22/21 Warner  Future Office visit:       Routing refill request to provider for review/approval because:  Drug not on the [facility], P or [facility] refill protocol or controlled substance    Marizol PEREZ RN  EP Triage

## 2021-12-17 NOTE — CONFIDENTIAL NOTE
Due for office visit, only 3 months supply submitted.  No additional refills until seen in clinic.    Please call and inform pt to schedule a/an an annual exam with PCP in Feb 2022.

## 2022-02-11 ENCOUNTER — MYC REFILL (OUTPATIENT)
Dept: FAMILY MEDICINE | Facility: CLINIC | Age: 27
End: 2022-02-11
Payer: COMMERCIAL

## 2022-02-11 DIAGNOSIS — F90.9 ATTENTION DEFICIT HYPERACTIVITY DISORDER (ADHD), UNSPECIFIED ADHD TYPE: ICD-10-CM

## 2022-02-11 RX ORDER — DEXTROAMPHETAMINE SACCHARATE, AMPHETAMINE ASPARTATE, DEXTROAMPHETAMINE SULFATE AND AMPHETAMINE SULFATE 5; 5; 5; 5 MG/1; MG/1; MG/1; MG/1
20 TABLET ORAL DAILY
Qty: 30 TABLET | Refills: 0 | Status: CANCELLED | OUTPATIENT
Start: 2022-02-11

## 2022-02-21 ENCOUNTER — OFFICE VISIT (OUTPATIENT)
Dept: FAMILY MEDICINE | Facility: CLINIC | Age: 27
End: 2022-02-21
Payer: COMMERCIAL

## 2022-02-21 VITALS
BODY MASS INDEX: 26.74 KG/M2 | TEMPERATURE: 98.4 F | HEIGHT: 66 IN | OXYGEN SATURATION: 98 % | HEART RATE: 119 BPM | DIASTOLIC BLOOD PRESSURE: 82 MMHG | WEIGHT: 166.4 LBS | SYSTOLIC BLOOD PRESSURE: 122 MMHG

## 2022-02-21 DIAGNOSIS — F90.9 ATTENTION DEFICIT HYPERACTIVITY DISORDER (ADHD), UNSPECIFIED ADHD TYPE: ICD-10-CM

## 2022-02-21 DIAGNOSIS — Z00.00 HEALTH CARE MAINTENANCE: Primary | ICD-10-CM

## 2022-02-21 LAB
AMPHETAMINES UR QL: DETECTED
BARBITURATES UR QL SCN: NOT DETECTED
BENZODIAZ UR QL SCN: NOT DETECTED
BUPRENORPHINE UR QL: NOT DETECTED
CANNABINOIDS UR QL: NOT DETECTED
COCAINE UR QL SCN: NOT DETECTED
D-METHAMPHET UR QL: NOT DETECTED
ERYTHROCYTE [DISTWIDTH] IN BLOOD BY AUTOMATED COUNT: 12.3 % (ref 10–15)
HCT VFR BLD AUTO: 48.7 % (ref 40–53)
HGB BLD-MCNC: 16.9 G/DL (ref 13.3–17.7)
MCH RBC QN AUTO: 29.4 PG (ref 26.5–33)
MCHC RBC AUTO-ENTMCNC: 34.7 G/DL (ref 31.5–36.5)
MCV RBC AUTO: 85 FL (ref 78–100)
METHADONE UR QL SCN: NOT DETECTED
OPIATES UR QL SCN: NOT DETECTED
OXYCODONE UR QL SCN: NOT DETECTED
PCP UR QL SCN: NOT DETECTED
PLATELET # BLD AUTO: 281 10E3/UL (ref 150–450)
PROPOXYPH UR QL: NOT DETECTED
RBC # BLD AUTO: 5.75 10E6/UL (ref 4.4–5.9)
TRICYCLICS UR QL SCN: NOT DETECTED
WBC # BLD AUTO: 8.3 10E3/UL (ref 4–11)

## 2022-02-21 PROCEDURE — 36415 COLL VENOUS BLD VENIPUNCTURE: CPT | Performed by: FAMILY MEDICINE

## 2022-02-21 PROCEDURE — 85027 COMPLETE CBC AUTOMATED: CPT | Performed by: FAMILY MEDICINE

## 2022-02-21 PROCEDURE — 80053 COMPREHEN METABOLIC PANEL: CPT | Performed by: FAMILY MEDICINE

## 2022-02-21 PROCEDURE — 80306 DRUG TEST PRSMV INSTRMNT: CPT | Performed by: FAMILY MEDICINE

## 2022-02-21 PROCEDURE — 99395 PREV VISIT EST AGE 18-39: CPT | Performed by: FAMILY MEDICINE

## 2022-02-21 PROCEDURE — 80061 LIPID PANEL: CPT | Performed by: FAMILY MEDICINE

## 2022-02-21 ASSESSMENT — ENCOUNTER SYMPTOMS
HEARTBURN: 0
ARTHRALGIAS: 1
SHORTNESS OF BREATH: 0
COUGH: 0
SORE THROAT: 0
MYALGIAS: 1
CONSTIPATION: 0
NAUSEA: 0
JOINT SWELLING: 0
ABDOMINAL PAIN: 0
DIARRHEA: 0
HEADACHES: 0
WEAKNESS: 0
HEMATOCHEZIA: 0
PARESTHESIAS: 0
HEMATURIA: 0
FEVER: 0
DYSURIA: 0
DIZZINESS: 0
FREQUENCY: 0
PALPITATIONS: 0
NERVOUS/ANXIOUS: 1
CHILLS: 0
EYE PAIN: 0

## 2022-02-21 NOTE — LETTER
[facility] [place]  02/21/22  Patient: Denver Brewer  YOB: 1995  Medical Record Number: [id number]                                                                                  Non-Opioid Controlled Substance Agreement    This is an agreement between you and your provider regarding safe and appropriate use of controlled substances prescribed by your care team. Controlled substances are?medicines that can cause physical and mental dependence (abuse).     There are strict laws about having and using these medicines. We here at [facility] are  committed to working with you in your efforts to get better. To support you in this work, we'll help you schedule regular office appointments for medicine refills. If we must cancel or change your appointment for any reason, we'll make sure you have enough medicine to last until your next appointment.     As a Provider, I will:     Listen carefully to your concerns while treating you with respect.     Recommend a treatment plan that I believe is in your best interest and may involve therapies other than medicine.      Talk with you often about the possible benefits and the risk of harm of any medicine that we prescribe for you.    Assess the safety of this medicine and check how well it works.      Provide a plan on how to taper (discontinue or go off) using this medicine if the decision is made to stop its use.      ::  As a Patient, I understand controlled substances:       Are prescribed by my care provider to help me function or work and manage my condition(s).?    Are strong medicines and can cause serious side effects.       Need to be taken exactly as prescribed.?Combining controlled substances with certain medicines or chemicals (such as illegal drugs, alcohol, sedatives, sleeping pills, and benzodiazepines) can be dangerous or even fatal.? If I stop taking my medicines suddenly, I may have severe withdrawal symptoms.     The  risks, benefits, and side effects of these medicine(s) were explained to me. I agree that:    1. I will take part in other treatments as advised by my care team. This may be psychiatry or counseling, physical therapy, behavioral therapy, group treatment or a referral to specialist.    2. I will keep all my appointments and understand this is part of the monitoring of controlled substances.?My care team may require an office visit for EVERY controlled substance refill. If I miss appointments or don t follow instructions, my care team may stop my medicine    3. I will take my medicines as prescribed. I will not change the dose or schedule unless my care team tells me to. There will be no refills if I run out early.      4. I may be asked to come to the clinic and complete a urine drug test or complete a pill count. If I don t give a urine sample or participate in a pill count, the care team may stop my medicine.    5. I will only receive controlled substance prescriptions from this clinic. If I am treated by another provider, I will tell them that I am taking controlled substances and that I have a treatment agreement with this provider. I will inform my [facility] care team within one business day if I am given a prescription for any controlled substance by another healthcare provider. My [facility] care team can contact other providers and pharmacists about my use of any medicines.    6. It is up to me to make sure that I don't run out of my medicines on weekends or holidays.?If my care team is willing to refill my prescription without a visit, I must request refills only during office hours. Refills may take up to 3 business days to process. I will use one pharmacy to fill all my controlled substance prescriptions. I will notify the clinic about any changes to my insurance or medicine availability.    7. I am responsible for my prescriptions. If the medicine/prescription is lost, stolen or destroyed,  it will not be replaced.?I also agree not to share controlled substance medicines with anyone.     8. I am aware I should not use any illegal or recreational drugs. I agree not to drink alcohol unless my care team says I can.     9. If I enroll in the [place] Medical Cannabis program, I will tell my care team before my next refill.    10. I will tell my care team right away if I become pregnant, have a new medical problem treated outside of my regular clinic, or have a change in my medicines.     11. I understand that this medicine can affect my thinking, judgment and reaction time.? Alcohol and drugs affect the brain and body, which can affect the safety of my driving. Being under the influence of alcohol or drugs can affect my decision-making, behaviors, personal safety and the safety of others. Driving while impaired (DWI) can occur if a person is driving, operating or in physical control of a car, motorcycle, boat, snowmobile, ATV, motorbike, off-road vehicle or any other motor vehicle ([place] Statute 169A.20). I understand the risk if I choose to drive or operate any vehicle or machinery.    I understand that if I do not follow any of the conditions above, my prescriptions or treatment may be stopped or changed.   I agree that my provider, clinic care team and pharmacy may work with any city, state or federal law enforcement agency that investigates the misuse, sale or other diversion of my controlled medicine. I will allow my provider to discuss my care with, or share a copy of, this agreement with any other treating provider, pharmacy or emergency room where I receive care.     I have read this agreement and have asked questions about anything I did not understand.    ________________________________________________________  Patient Signature - Denver Brweer     ___________________                   Date     ________________________________________________________  Provider Signature - Alejandro Warner MD        ___________________                   Date     ________________________________________________________  Witness Signature (required if provider not present while patient signing)          ___________________                   Date

## 2022-02-21 NOTE — PROGRESS NOTES
SUBJECTIVE:   CC: Denver Brewer is an 26 year old male who presents for preventative health visit.     Patient has been advised of split billing requirements and indicates understanding: Yes     Healthy Habits:     Getting at least 3 servings of Calcium per day:  NO    Bi-annual eye exam:  NO    Dental care twice a year:  Yes    Sleep apnea or symptoms of sleep apnea:  None    Diet:  Regular (no restrictions)    Frequency of exercise:  None    Taking medications regularly:  Yes    Medication side effects:  Muscle aches    PHQ-2 Total Score: 2    Additional concerns today:  No          Today's PHQ-2 Score:   PHQ-2 ( 1999 [employer]) 2/21/2022   Q1: Little interest or pleasure in doing things 1   Q2: Feeling down, depressed or hopeless 1   PHQ-2 Score 2   PHQ-2 Total Score (12-17 Years)- Positive if 3 or more points; Administer PHQ-A if positive -   Q1: Little interest or pleasure in doing things Several days   Q2: Feeling down, depressed or hopeless Several days   PHQ-2 Score 2       Abuse: Current or Past(Physical, Sexual or Emotional)- No  Do you feel safe in your environment? Yes      Social History     Tobacco Use     Smoking status: Never Smoker     Smokeless tobacco: Never Used   Substance Use Topics     Alcohol use: Yes     Comment: rare     If you drink alcohol do you typically have >3 drinks per day or >7 drinks per week? No    Alcohol Use 2/21/2022   Prescreen: >3 drinks/day or >7 drinks/week? Not Applicable   Prescreen: >3 drinks/day or >7 drinks/week? -       Last PSA: No results found for: PSA    Reviewed orders with patient. Reviewed health maintenance and updated orders accordingly - Yes  BP Readings from Last 3 Encounters:   02/21/22 122/82   10/27/20 116/74   09/25/19 110/80    Wt Readings from Last 3 Encounters:   02/21/22 75.5 kg (166 lb 6.4 oz)   10/27/20 75.8 kg (167 lb)   09/25/19 70.8 kg (156 lb)                  Patient Active Problem List   Diagnosis     Attention deficit hyperactivity disorder  (ADHD), unspecified ADHD type     Anxiety     Somatic dysfunction of lumbar region     Chronic bilateral low back pain without sciatica     Cervical segment dysfunction     Cervicalgia     Cephalgia     Mood disorder (H)     No past surgical history on file.    Social History     Tobacco Use     Smoking status: Never Smoker     Smokeless tobacco: Never Used   Substance Use Topics     Alcohol use: Yes     Comment: rare     Family History   Problem Relation Age of Onset     Family History Negative Other          Current Outpatient Medications   Medication Sig Dispense Refill     amphetamine-dextroamphetamine (ADDERALL) 20 MG tablet Take 1 tablet (20 mg) by mouth daily 30 tablet 0     amphetamine-dextroamphetamine (ADDERALL) 20 MG tablet Take 1 tablet (20 mg) by mouth daily 30 tablet 0     amphetamine-dextroamphetamine (ADDERALL) 20 MG tablet Take 1 tablet (20 mg) by mouth daily 30 tablet 0     Allergies   Allergen Reactions     Cats      No lab results found.     Reviewed and updated as needed this visit by clinical staff                  Reviewed and updated as needed this visit by Provider                 Past Medical History:   Diagnosis Date     ADHD (attention deficit hyperactivity disorder)       No past surgical history on file.    Review of Systems   Constitutional: Negative for chills and fever.   HENT: Negative for congestion, ear pain, hearing loss and sore throat.    Eyes: Negative for pain and visual disturbance.   Respiratory: Negative for cough and shortness of breath.    Cardiovascular: Negative for chest pain, palpitations and peripheral edema.   Gastrointestinal: Negative for abdominal pain, constipation, diarrhea, heartburn, hematochezia and nausea.   Genitourinary: Negative for dysuria, frequency, genital sores, hematuria, impotence, penile discharge and urgency.   Musculoskeletal: Positive for arthralgias and myalgias. Negative for joint swelling.   Skin: Negative for rash.   Neurological:  "Negative for dizziness, weakness, headaches and paresthesias.   Psychiatric/Behavioral: Negative for mood changes. The patient is nervous/anxious.          OBJECTIVE:   /82   Pulse 119   Temp 98.4  F (36.9  C) (Tympanic)   Ht 1.676 m (5' 6\")   Wt 75.5 kg (166 lb 6.4 oz)   SpO2 98%   BMI 26.86 kg/m      Physical Exam  GENERAL: healthy, alert and no distress  EYES: Eyes grossly normal to inspection, PERRL and conjunctivae and sclerae normal  HENT: ear canals and TM's normal, nose and mouth without ulcers or lesions  NECK: no adenopathy, no asymmetry, masses, or scars and thyroid normal to palpation  RESP: lungs clear to auscultation - no rales, rhonchi or wheezes  CV: regular rate and rhythm, normal S1 S2, no S3 or S4, no murmur, click or rub, no peripheral edema and peripheral pulses strong  ABDOMEN: soft, nontender, no hepatosplenomegaly, no masses and bowel sounds normal  MS: no gross musculoskeletal defects noted, no edema  SKIN: no suspicious lesions or rashes  NEURO: Normal strength and tone, mentation intact and speech normal  BACK: no CVA tenderness, no paralumbar tenderness  PSYCH: mentation appears normal, affect normal/bright  LYMPH: no cervical, supraclavicular, axillary, or inguinal adenopathy        ASSESSMENT/PLAN:       ICD-10-CM    1. Health care maintenance  Z00.00 CBC with platelets     Comprehensive metabolic panel (BMP + Alb, Alk Phos, ALT, AST, Total. Bili, TP)     Lipid panel reflex to direct LDL Fasting     CBC with platelets     Comprehensive metabolic panel (BMP + Alb, Alk Phos, ALT, AST, Total. Bili, TP)     Lipid panel reflex to direct LDL Fasting   2. Attention deficit hyperactivity disorder (ADHD), unspecified ADHD type  F90.9 Drug Abuse Screen Panel 13, Urine (Pain Care Package) - lab collect     Drug Abuse Screen Panel 13, Urine (Pain Care Package) - lab collect           COUNSELING:   Reviewed preventive health counseling, as reflected in patient instructions    Estimated " "body mass index is 26.86 kg/m  as calculated from the following:    Height as of this encounter: 1.676 m (5' 6\").    Weight as of this encounter: 75.5 kg (166 lb 6.4 oz).     Weight management plan: Discussed healthy diet and exercise guidelines    He reports that he has never smoked. He has never used smokeless tobacco.      Counseling Resources:  ATP IV Guidelines  Pooled Cohorts Equation Calculator  FRAX Risk Assessment  ICSI Preventive Guidelines  Dietary Guidelines for Americans, 2010  USDA's MyPlate  ASA Prophylaxis  Lung CA Screening    Alejandro Warner MD  [facility]  "

## 2022-02-22 LAB
ALBUMIN SERPL-MCNC: 4.4 G/DL (ref 3.4–5)
ALP SERPL-CCNC: 89 U/L (ref 40–150)
ALT SERPL W P-5'-P-CCNC: 222 U/L (ref 0–70)
ANION GAP SERPL CALCULATED.3IONS-SCNC: 11 MMOL/L (ref 3–14)
AST SERPL W P-5'-P-CCNC: 59 U/L (ref 0–45)
BILIRUB SERPL-MCNC: 0.8 MG/DL (ref 0.2–1.3)
BUN SERPL-MCNC: 9 MG/DL (ref 7–30)
CALCIUM SERPL-MCNC: 9.3 MG/DL (ref 8.5–10.1)
CHLORIDE BLD-SCNC: 103 MMOL/L (ref 94–109)
CHOLEST SERPL-MCNC: 207 MG/DL
CO2 SERPL-SCNC: 23 MMOL/L (ref 20–32)
CREAT SERPL-MCNC: 0.92 MG/DL (ref 0.66–1.25)
FASTING STATUS PATIENT QL REPORTED: YES
GFR SERPL CREATININE-BSD FRML MDRD: >90 ML/MIN/1.73M2
GLUCOSE BLD-MCNC: 97 MG/DL (ref 70–99)
HDLC SERPL-MCNC: 40 MG/DL
LDLC SERPL CALC-MCNC: 117 MG/DL
NONHDLC SERPL-MCNC: 167 MG/DL
POTASSIUM BLD-SCNC: 3.6 MMOL/L (ref 3.4–5.3)
PROT SERPL-MCNC: 7.9 G/DL (ref 6.8–8.8)
SODIUM SERPL-SCNC: 137 MMOL/L (ref 133–144)
TRIGL SERPL-MCNC: 248 MG/DL

## 2022-05-25 ENCOUNTER — MYC REFILL (OUTPATIENT)
Dept: FAMILY MEDICINE | Facility: CLINIC | Age: 27
End: 2022-05-25
Payer: COMMERCIAL

## 2022-05-25 DIAGNOSIS — F90.9 ATTENTION DEFICIT HYPERACTIVITY DISORDER (ADHD), UNSPECIFIED ADHD TYPE: ICD-10-CM

## 2022-05-27 RX ORDER — DEXTROAMPHETAMINE SACCHARATE, AMPHETAMINE ASPARTATE, DEXTROAMPHETAMINE SULFATE AND AMPHETAMINE SULFATE 5; 5; 5; 5 MG/1; MG/1; MG/1; MG/1
20 TABLET ORAL DAILY
Qty: 30 TABLET | Refills: 0 | Status: SHIPPED | OUTPATIENT
Start: 2022-05-27 | End: 2022-09-27

## 2022-05-27 RX ORDER — DEXTROAMPHETAMINE SACCHARATE, AMPHETAMINE ASPARTATE, DEXTROAMPHETAMINE SULFATE AND AMPHETAMINE SULFATE 5; 5; 5; 5 MG/1; MG/1; MG/1; MG/1
20 TABLET ORAL DAILY
Qty: 30 TABLET | Refills: 0 | Status: CANCELLED | OUTPATIENT
Start: 2022-05-27

## 2022-07-22 ENCOUNTER — TRANSFERRED RECORDS (OUTPATIENT)
Dept: FAMILY MEDICINE | Facility: CLINIC | Age: 27
End: 2022-07-22

## 2022-07-24 ENCOUNTER — TRANSFERRED RECORDS (OUTPATIENT)
Dept: FAMILY MEDICINE | Facility: CLINIC | Age: 27
End: 2022-07-24

## 2022-08-06 ENCOUNTER — MYC REFILL (OUTPATIENT)
Dept: FAMILY MEDICINE | Facility: CLINIC | Age: 27
End: 2022-08-06

## 2022-08-06 DIAGNOSIS — F90.9 ATTENTION DEFICIT HYPERACTIVITY DISORDER (ADHD), UNSPECIFIED ADHD TYPE: ICD-10-CM

## 2022-08-08 RX ORDER — DEXTROAMPHETAMINE SACCHARATE, AMPHETAMINE ASPARTATE, DEXTROAMPHETAMINE SULFATE AND AMPHETAMINE SULFATE 5; 5; 5; 5 MG/1; MG/1; MG/1; MG/1
20 TABLET ORAL DAILY
Qty: 30 TABLET | Refills: 0 | Status: SHIPPED | OUTPATIENT
Start: 2022-08-08 | End: 2022-09-27

## 2022-09-24 ENCOUNTER — MYC REFILL (OUTPATIENT)
Dept: FAMILY MEDICINE | Facility: CLINIC | Age: 27
End: 2022-09-24

## 2022-09-24 DIAGNOSIS — F90.9 ATTENTION DEFICIT HYPERACTIVITY DISORDER (ADHD), UNSPECIFIED ADHD TYPE: ICD-10-CM

## 2022-09-26 ENCOUNTER — MYC REFILL (OUTPATIENT)
Dept: FAMILY MEDICINE | Facility: CLINIC | Age: 27
End: 2022-09-26

## 2022-09-26 ENCOUNTER — TELEPHONE (OUTPATIENT)
Dept: FAMILY MEDICINE | Facility: CLINIC | Age: 27
End: 2022-09-26

## 2022-09-26 DIAGNOSIS — F90.9 ATTENTION DEFICIT HYPERACTIVITY DISORDER (ADHD), UNSPECIFIED ADHD TYPE: ICD-10-CM

## 2022-09-26 NOTE — TELEPHONE ENCOUNTER
Medication Question or Refill    Contacts       Type Contact Phone/Fax    09/26/2022 03:43 PM CDT Phone (Incoming) Daniella Frank TUCKER (Self) [phone] (H)          What medication are you calling about (include dose and sig)?: Adderall    Controlled Substance Agreement on file:   CSA -- Patient Level:    Controlled Substance Agreement - Opioid - Scan on 2/22/2022  9:12 AM       Who prescribed the medication?: Dr. Ambrose    Do you need a refill? Yes:     When did you use the medication last? Yesterday    Patient offered an appointment? No    Do you have any questions or concerns?  Yes, patient requested refill on 09/24/2022 and has not heard anything back yet.  Is out of medication.    Preferred Pharmacy:   [id number] - [place] - [address] AT [address]  [address] [place] [address]  Phone: [phone] Fax: [phone]      [facility] or would you prefer to receive a phone call?:   Patient would prefer a phone call   Okay to leave a detailed message?: Yes at Cell number on file:    Telephone Information:   Mobile [phone]

## 2022-09-27 RX ORDER — DEXTROAMPHETAMINE SACCHARATE, AMPHETAMINE ASPARTATE, DEXTROAMPHETAMINE SULFATE AND AMPHETAMINE SULFATE 5; 5; 5; 5 MG/1; MG/1; MG/1; MG/1
20 TABLET ORAL DAILY
Qty: 30 TABLET | Refills: 0 | OUTPATIENT
Start: 2022-09-27

## 2022-09-27 RX ORDER — DEXTROAMPHETAMINE SACCHARATE, AMPHETAMINE ASPARTATE, DEXTROAMPHETAMINE SULFATE AND AMPHETAMINE SULFATE 5; 5; 5; 5 MG/1; MG/1; MG/1; MG/1
20 TABLET ORAL DAILY
Qty: 30 TABLET | Refills: 0 | Status: SHIPPED | OUTPATIENT
Start: 2022-09-27 | End: 2022-11-24

## 2022-11-24 ENCOUNTER — MYC REFILL (OUTPATIENT)
Dept: FAMILY MEDICINE | Facility: CLINIC | Age: 27
End: 2022-11-24

## 2022-11-24 DIAGNOSIS — F90.9 ATTENTION DEFICIT HYPERACTIVITY DISORDER (ADHD), UNSPECIFIED ADHD TYPE: ICD-10-CM

## 2022-11-25 NOTE — TELEPHONE ENCOUNTER
Routing refill request to provider for review/approval because:  Drug not on the [facility] refill protocol     Katia HADDAD RN  EP Triage

## 2022-11-28 RX ORDER — DEXTROAMPHETAMINE SACCHARATE, AMPHETAMINE ASPARTATE, DEXTROAMPHETAMINE SULFATE AND AMPHETAMINE SULFATE 5; 5; 5; 5 MG/1; MG/1; MG/1; MG/1
20 TABLET ORAL DAILY
Qty: 30 TABLET | Refills: 0 | Status: SHIPPED | OUTPATIENT
Start: 2022-11-28 | End: 2023-01-30

## 2022-11-28 NOTE — TELEPHONE ENCOUNTER
Refill ordered.  Patient is due for follow-up for further refills    Jacob Ambrose MD  [facility] [place]

## 2022-12-13 ENCOUNTER — TRANSFERRED RECORDS (OUTPATIENT)
Dept: HEALTH INFORMATION MANAGEMENT | Facility: CLINIC | Age: 27
End: 2022-12-13

## 2023-01-30 ENCOUNTER — MYC REFILL (OUTPATIENT)
Dept: FAMILY MEDICINE | Facility: CLINIC | Age: 28
End: 2023-01-30

## 2023-01-30 DIAGNOSIS — F90.9 ATTENTION DEFICIT HYPERACTIVITY DISORDER (ADHD), UNSPECIFIED ADHD TYPE: ICD-10-CM

## 2023-01-30 RX ORDER — DEXTROAMPHETAMINE SACCHARATE, AMPHETAMINE ASPARTATE, DEXTROAMPHETAMINE SULFATE AND AMPHETAMINE SULFATE 5; 5; 5; 5 MG/1; MG/1; MG/1; MG/1
20 TABLET ORAL DAILY
Qty: 30 TABLET | Refills: 0 | Status: SHIPPED | OUTPATIENT
Start: 2023-01-30 | End: 2023-04-03

## 2023-04-03 ENCOUNTER — OFFICE VISIT (OUTPATIENT)
Dept: FAMILY MEDICINE | Facility: CLINIC | Age: 28
End: 2023-04-03
Payer: COMMERCIAL

## 2023-04-03 VITALS
DIASTOLIC BLOOD PRESSURE: 87 MMHG | HEART RATE: 111 BPM | TEMPERATURE: 97.7 F | OXYGEN SATURATION: 98 % | BODY MASS INDEX: 25.88 KG/M2 | WEIGHT: 161 LBS | HEIGHT: 66 IN | SYSTOLIC BLOOD PRESSURE: 129 MMHG | RESPIRATION RATE: 19 BRPM

## 2023-04-03 DIAGNOSIS — F90.9 ATTENTION DEFICIT HYPERACTIVITY DISORDER (ADHD), UNSPECIFIED ADHD TYPE: Primary | ICD-10-CM

## 2023-04-03 DIAGNOSIS — R79.89 ELEVATED LFTS: ICD-10-CM

## 2023-04-03 DIAGNOSIS — G47.00 PERSISTENT DISORDER OF INITIATING OR MAINTAINING SLEEP: ICD-10-CM

## 2023-04-03 PROBLEM — F39 MOOD DISORDER (H): Status: RESOLVED | Noted: 2021-11-22 | Resolved: 2023-04-03

## 2023-04-03 PROCEDURE — 80053 COMPREHEN METABOLIC PANEL: CPT | Performed by: PHYSICIAN ASSISTANT

## 2023-04-03 PROCEDURE — 82977 ASSAY OF GGT: CPT | Performed by: PHYSICIAN ASSISTANT

## 2023-04-03 PROCEDURE — 80306 DRUG TEST PRSMV INSTRMNT: CPT | Performed by: PHYSICIAN ASSISTANT

## 2023-04-03 PROCEDURE — 99214 OFFICE O/P EST MOD 30 MIN: CPT | Performed by: PHYSICIAN ASSISTANT

## 2023-04-03 PROCEDURE — 36415 COLL VENOUS BLD VENIPUNCTURE: CPT | Performed by: PHYSICIAN ASSISTANT

## 2023-04-03 RX ORDER — DEXTROAMPHETAMINE SACCHARATE, AMPHETAMINE ASPARTATE, DEXTROAMPHETAMINE SULFATE AND AMPHETAMINE SULFATE 5; 5; 5; 5 MG/1; MG/1; MG/1; MG/1
20 TABLET ORAL DAILY
Qty: 30 TABLET | Refills: 0 | Status: SHIPPED | OUTPATIENT
Start: 2023-04-03 | End: 2023-06-28

## 2023-04-03 ASSESSMENT — PAIN SCALES - GENERAL: PAINLEVEL: NO PAIN (0)

## 2023-04-03 NOTE — PROGRESS NOTES
Assessment & Plan       ICD-10-CM    1. Attention deficit hyperactivity disorder (ADHD), unspecified ADHD type  F90.9 amphetamine-dextroamphetamine (ADDERALL) 20 MG tablet     Drug Abuse Screen Panel 13, Urine (Pain Care Package) - lab collect     Drug Abuse Screen Panel 13, Urine (Pain Care Package) - lab collect      2. Elevated LFTs  R79.89 Comprehensive metabolic panel (BMP + Alb, Alk Phos, ALT, AST, Total. Bili, TP)     GGT     Comprehensive metabolic panel (BMP + Alb, Alk Phos, ALT, AST, Total. Bili, TP)     GGT      3. Persistent disorder of initiating or maintaining sleep  G47.00         - Patient is tolerating current medication without any major side effects or concerns. Continue current treatment without any changes. Refills as above. Patient considering transferring care to [facility].  - Reviewed elevated LFTs from last year, recommend recheck. Patient reports he was told he has fatty liver years ago, but no follow-up was ever done.  - Discussed use of room darkening curtains so that sunlight doesn't interfere with sleep. Discussed recent high stress level from move may be contributing as well. Reviewed good sleep hygiene practices.    BRY Giron [facility] [place] is a 27 year old, presenting for the following health issues:  Recheck Medication         View : No data to display.              History of Present Illness       Reason for visit:  Check for ADHD and other health stuff    He eats 0-1 servings of fruits and vegetables daily.He consumes 1 sweetened beverage(s) daily.He exercises with enough effort to increase his heart rate 10 to 19 minutes per day.  He exercises with enough effort to increase his heart rate 3 or less days per week. He is missing 3 dose(s) of medications per week.  He is not taking prescribed medications regularly due to other.     Lack of appetite. Moved to [facility] recently.   lfts elevated last year  Waking after 6  "hrs sleep  Loose stools, daily    - Patient here for a recheck. Recently moved to the [facility] area. Addhebertl continues to work well when he needs it; takes drug holiday on non-work days. Notes appetite suppression but no issues with abnormal weight loss, typically has 1 large meal per day.  - Has been waking after about 6 hours of sleep. He works from [id number]. He gets a lot of sunlight in his new bedroom, which may be contributing.      Review of Systems   Constitutional, HEENT, cardiovascular, pulmonary, GI, , musculoskeletal, neuro, skin, endocrine and psych systems are negative, except as otherwise noted.      Objective    /87   Pulse 111   Temp 97.7  F (36.5  C) (Temporal)   Resp 19   Ht 1.671 m (5' 5.79\")   Wt 73 kg (161 lb)   SpO2 98%   BMI 26.15 kg/m    Body mass index is 26.15 kg/m .   Wt Readings from Last 4 Encounters:   04/03/23 73 kg (161 lb)   02/21/22 75.5 kg (166 lb 6.4 oz)   10/27/20 75.8 kg (167 lb)   09/25/19 70.8 kg (156 lb)     Physical Exam   GENERAL:  WDWN, no acute distress  PSYCH: pleasant, cooperative  EYES: no discharge, no injection  HENT:  Normocephalic. Moist mucus membranes.  NECK:  Supple, symmetric  LUNGS:  Clear to auscultation bilaterally without rhonchi, rales, or wheeze. Chest rise symmetric and no tenderness to palpation.  HEART:  Regular rate & rhythm. No murmur, gallop, or rub.  EXTREMITIES:  No gross deformities, moves all 4 limbs spontaneously, no peripheral edema  SKIN:  Warm and dry, no rash or suspicious lesions    NEUROLOGIC:  alert, sensation grossly intact.    Office Visit on 02/21/2022   Component Date Value Ref Range Status     WBC Count 02/21/2022 8.3  4.0 - 11.0 10e3/uL Final     RBC Count 02/21/2022 5.75  4.40 - 5.90 10e6/uL Final     Hemoglobin 02/21/2022 16.9  13.3 - 17.7 g/dL Final     Hematocrit 02/21/2022 48.7  40.0 - 53.0 % Final     MCV 02/21/2022 85  78 - 100 fL Final     MCH 02/21/2022 29.4  26.5 - 33.0 pg Final     MCHC 02/21/2022 34.7  " 31.5 - 36.5 g/dL Final     RDW 02/21/2022 12.3  10.0 - 15.0 % Final     Platelet Count 02/21/2022 281  150 - 450 10e3/uL Final     Sodium 02/21/2022 137  133 - 144 mmol/L Final     Potassium 02/21/2022 3.6  3.4 - 5.3 mmol/L Final     Chloride 02/21/2022 103  94 - 109 mmol/L Final     Carbon Dioxide (CO2) 02/21/2022 23  20 - 32 mmol/L Final     Anion Gap 02/21/2022 11  3 - 14 mmol/L Final     Urea Nitrogen 02/21/2022 9  7 - 30 mg/dL Final     Creatinine 02/21/2022 0.92  0.66 - 1.25 mg/dL Final     Calcium 02/21/2022 9.3  8.5 - 10.1 mg/dL Final     Glucose 02/21/2022 97  70 - 99 mg/dL Final     Alkaline Phosphatase 02/21/2022 89  40 - 150 U/L Final     AST 02/21/2022 59 (H)  0 - 45 U/L Final     ALT 02/21/2022 222 (H)  0 - 70 U/L Final     Protein Total 02/21/2022 7.9  6.8 - 8.8 g/dL Final     Albumin 02/21/2022 4.4  3.4 - 5.0 g/dL Final     Bilirubin Total 02/21/2022 0.8  0.2 - 1.3 mg/dL Final     GFR Estimate 02/21/2022 >90  >60 mL/min/1.73m2 Final    Effective December 21, 2021 eGFRcr in adults is calculated using the 2021 CKD-EPI creatinine equation which includes age and gender (Taj et al., NEJ, DOI: 10.1056/CMVBrj5444819)     Cholesterol 02/21/2022 207 (H)  <200 mg/dL Final     Triglycerides 02/21/2022 248 (H)  <150 mg/dL Final     Direct Measure HDL 02/21/2022 40  >=40 mg/dL Final     LDL Cholesterol Calculated 02/21/2022 117 (H)  <=100 mg/dL Final     Non HDL Cholesterol 02/21/2022 167 (H)  <130 mg/dL Final     Patient Fasting > 8hrs? 02/21/2022 Yes   Final     Cannabinoids (42-[id number]-2-[id number]-9-T* 02/21/2022 Not Detected  Not Detected, Indeterminate Final    Cutoff for a negative cannabinoid is 50 ng/mL or less.     Phencyclidine 02/21/2022 Not Detected  Not Detected, Indeterminate Final    Cutoff for a negative PCP is 25 ng/mL or less.     Cocaine (Benzoylecgonine) 02/21/2022 Not Detected  Not Detected, Indeterminate Final    Cutoff for a negative cocaine is 150 ng/ml or less.     Methamphetamine  (d-Methamphetamine) 02/21/2022 Not Detected  Not Detected, Indeterminate Final    Cutoff for a negative methamphetamine is 500 ng/ml or less.     Opiates (Morphine) 02/21/2022 Not Detected  Not Detected, Indeterminate Final    Cutoff for a negative opiate is 100 ng/ml or less.     Amphetamine (d-Amphetamine) 02/21/2022 Detected (A)  Not Detected, Indeterminate Final    Cutoff for a positive amphetamine is greater than 500 ng/ml.  This is an unconfirmed screening result to be used for medical purposes only.      Benzodiazepines (Nordiazepam) 02/21/2022 Not Detected  Not Detected, Indeterminate Final    Cutoff for a negative benzodiazepine is 150 ng/ml or less.     Tricyclic Antidepressants (Desipra* 02/21/2022 Not Detected  Not Detected, Indeterminate Final    Cutoff for a negative tricyclic antidepressant is 300 ng/ml or less.     Methadone 02/21/2022 Not Detected  Not Detected, Indeterminate Final    Cutoff for a negative methadone is 200 ng/ml or less.     Barbiturates (Butalbital) 02/21/2022 Not Detected  Not Detected, Indeterminate Final    Cutoff for a negative barbituate is 200 ng/ml or less.     Oxycodone 02/21/2022 Not Detected  Not Detected, Indeterminate Final    Cutoff for a negative oxycodone is 100 ng/mL or less.     Propoxyphene (Norpropoxyphene) 02/21/2022 Not Detected  Not Detected, Indeterminate Final    Cutoff for a negative propoxyphene is 300 ng/ml or less.     Buprenorphine 02/21/2022 Not Detected  Not Detected, Indeterminate Final    Cutoff for a negative buprenorphine is 10 ng/ml or less.

## 2023-04-04 LAB
ALBUMIN SERPL BCG-MCNC: 4.5 G/DL (ref 3.5–5.2)
ALP SERPL-CCNC: 72 U/L (ref 40–129)
ALT SERPL W P-5'-P-CCNC: 58 U/L (ref 10–50)
AMPHETAMINES UR QL: NOT DETECTED
ANION GAP SERPL CALCULATED.3IONS-SCNC: 14 MMOL/L (ref 7–15)
AST SERPL W P-5'-P-CCNC: 30 U/L (ref 10–50)
BARBITURATES UR QL SCN: NOT DETECTED
BENZODIAZ UR QL SCN: NOT DETECTED
BILIRUB SERPL-MCNC: 0.7 MG/DL
BUN SERPL-MCNC: 12.5 MG/DL (ref 6–20)
BUPRENORPHINE UR QL: NOT DETECTED
CALCIUM SERPL-MCNC: 9.6 MG/DL (ref 8.6–10)
CANNABINOIDS UR QL: NOT DETECTED
CHLORIDE SERPL-SCNC: 102 MMOL/L (ref 98–107)
COCAINE UR QL SCN: NOT DETECTED
CREAT SERPL-MCNC: 0.94 MG/DL (ref 0.67–1.17)
D-METHAMPHET UR QL: NOT DETECTED
DEPRECATED HCO3 PLAS-SCNC: 27 MMOL/L (ref 22–29)
GFR SERPL CREATININE-BSD FRML MDRD: >90 ML/MIN/1.73M2
GGT SERPL-CCNC: 46 U/L (ref 8–61)
GLUCOSE SERPL-MCNC: 119 MG/DL (ref 70–99)
METHADONE UR QL SCN: NOT DETECTED
OPIATES UR QL SCN: NOT DETECTED
OXYCODONE UR QL SCN: NOT DETECTED
PCP UR QL SCN: NOT DETECTED
POTASSIUM SERPL-SCNC: 3.9 MMOL/L (ref 3.4–5.3)
PROPOXYPH UR QL: NOT DETECTED
PROT SERPL-MCNC: 7 G/DL (ref 6.4–8.3)
SODIUM SERPL-SCNC: 143 MMOL/L (ref 136–145)
TRICYCLICS UR QL SCN: NOT DETECTED

## 2023-05-05 ENCOUNTER — TRANSFERRED RECORDS (OUTPATIENT)
Dept: HEALTH INFORMATION MANAGEMENT | Facility: CLINIC | Age: 28
End: 2023-05-05
Payer: COMMERCIAL

## 2023-05-10 ENCOUNTER — TRANSFERRED RECORDS (OUTPATIENT)
Dept: HEALTH INFORMATION MANAGEMENT | Facility: CLINIC | Age: 28
End: 2023-05-10
Payer: COMMERCIAL

## 2023-06-28 ENCOUNTER — MYC REFILL (OUTPATIENT)
Dept: FAMILY MEDICINE | Facility: CLINIC | Age: 28
End: 2023-06-28
Payer: COMMERCIAL

## 2023-06-28 DIAGNOSIS — F90.9 ATTENTION DEFICIT HYPERACTIVITY DISORDER (ADHD), UNSPECIFIED ADHD TYPE: ICD-10-CM

## 2023-06-29 RX ORDER — DEXTROAMPHETAMINE SACCHARATE, AMPHETAMINE ASPARTATE, DEXTROAMPHETAMINE SULFATE AND AMPHETAMINE SULFATE 5; 5; 5; 5 MG/1; MG/1; MG/1; MG/1
20 TABLET ORAL DAILY
Qty: 30 TABLET | Refills: 0 | Status: SHIPPED | OUTPATIENT
Start: 2023-06-29 | End: 2023-09-01

## 2023-06-29 NOTE — TELEPHONE ENCOUNTER
Covering for Arnol     checked and appropriate  Refill sent    MyChart message sent to patient informing this has been done.    Amada Lewis PA-C on 6/29/2023 at 3:12 PM    Head atraumatic, normal cephalic shape.

## 2023-09-01 ENCOUNTER — MYC REFILL (OUTPATIENT)
Dept: FAMILY MEDICINE | Facility: CLINIC | Age: 28
End: 2023-09-01
Payer: COMMERCIAL

## 2023-09-01 DIAGNOSIS — F90.9 ATTENTION DEFICIT HYPERACTIVITY DISORDER (ADHD), UNSPECIFIED ADHD TYPE: ICD-10-CM

## 2023-09-05 RX ORDER — DEXTROAMPHETAMINE SACCHARATE, AMPHETAMINE ASPARTATE, DEXTROAMPHETAMINE SULFATE AND AMPHETAMINE SULFATE 5; 5; 5; 5 MG/1; MG/1; MG/1; MG/1
20 TABLET ORAL DAILY
Qty: 30 TABLET | Refills: 0 | Status: SHIPPED | OUTPATIENT
Start: 2023-09-05 | End: 2023-10-19

## 2023-10-19 ENCOUNTER — MYC REFILL (OUTPATIENT)
Dept: FAMILY MEDICINE | Facility: CLINIC | Age: 28
End: 2023-10-19
Payer: COMMERCIAL

## 2023-10-19 DIAGNOSIS — F90.9 ATTENTION DEFICIT HYPERACTIVITY DISORDER (ADHD), UNSPECIFIED ADHD TYPE: ICD-10-CM

## 2023-10-20 RX ORDER — DEXTROAMPHETAMINE SACCHARATE, AMPHETAMINE ASPARTATE, DEXTROAMPHETAMINE SULFATE AND AMPHETAMINE SULFATE 5; 5; 5; 5 MG/1; MG/1; MG/1; MG/1
20 TABLET ORAL DAILY
Qty: 30 TABLET | Refills: 0 | Status: SHIPPED | OUTPATIENT
Start: 2023-10-20 | End: 2023-12-08

## 2023-12-08 ENCOUNTER — MYC REFILL (OUTPATIENT)
Dept: FAMILY MEDICINE | Facility: CLINIC | Age: 28
End: 2023-12-08
Payer: COMMERCIAL

## 2023-12-08 DIAGNOSIS — F90.9 ATTENTION DEFICIT HYPERACTIVITY DISORDER (ADHD), UNSPECIFIED ADHD TYPE: ICD-10-CM

## 2023-12-11 RX ORDER — DEXTROAMPHETAMINE SACCHARATE, AMPHETAMINE ASPARTATE, DEXTROAMPHETAMINE SULFATE AND AMPHETAMINE SULFATE 5; 5; 5; 5 MG/1; MG/1; MG/1; MG/1
20 TABLET ORAL DAILY
Qty: 30 TABLET | Refills: 0 | Status: SHIPPED | OUTPATIENT
Start: 2023-12-11 | End: 2024-03-04

## 2023-12-11 NOTE — TELEPHONE ENCOUNTER
Patient is due for a follow up VV     Jacob Ambrose MD  [facility] [place]

## 2023-12-15 NOTE — TELEPHONE ENCOUNTER
12:33 PM Recheck on patient. Discussed with patient ED findings and plan for discharge. Patient was given ED warnings, discharge instructions, and follow up information to go home with. Patient understands and agrees with plan for discharge. Any questions have been answered.  Pt ambulated out of Ed exit.    Spoke to pt, annual physical has been scheduled with Dr oreilly per pts request.Kristofer OSHEA, CMA

## 2023-12-26 ENCOUNTER — VIRTUAL VISIT (OUTPATIENT)
Dept: FAMILY MEDICINE | Facility: CLINIC | Age: 28
End: 2023-12-26
Payer: COMMERCIAL

## 2023-12-26 DIAGNOSIS — F90.9 ATTENTION DEFICIT HYPERACTIVITY DISORDER (ADHD), UNSPECIFIED ADHD TYPE: ICD-10-CM

## 2023-12-26 PROCEDURE — 99213 OFFICE O/P EST LOW 20 MIN: CPT | Mod: VID | Performed by: FAMILY MEDICINE

## 2023-12-26 RX ORDER — DEXTROAMPHETAMINE SACCHARATE, AMPHETAMINE ASPARTATE, DEXTROAMPHETAMINE SULFATE AND AMPHETAMINE SULFATE 5; 5; 5; 5 MG/1; MG/1; MG/1; MG/1
20 TABLET ORAL DAILY
Qty: 30 TABLET | Refills: 0 | Status: CANCELLED | OUTPATIENT
Start: 2023-12-26

## 2023-12-26 NOTE — PROGRESS NOTES
"Frank is a 28 year old who is being evaluated via a billable video visit.      How would you like to obtain your AVS? MyChart  If the video visit is dropped, the invitation should be resent by: Text to cell phone: [phone]  Will anyone else be joining your video visit? No          Assessment & Plan     Attention deficit hyperactivity disorder (ADHD), unspecified ADHD type  Symptoms well-managed with the use of Adderall 20 mg in a.m., 4 to 5 days a week.  Patient reports of occasionally noticing palpitations towards the end of the day.  He tells that he has been feeling dehydrated as well as he does not drink much fluids during the day.  He also has noticed increased appetite when he does not take the pills but overall his weight has been stable.  He sleeps well.  No depression or anxiety.  No other concerns voiced.  Instructed to increase hydration.  Avoid caffeinated beverages.  Use Adderall 20 mg in the morning as needed.  Follow-up in 6 months for annual exam and recheck on ADHD.         BMI:   Estimated body mass index is 26.15 kg/m  as calculated from the following:    Height as of 4/3/23: 1.671 m (5' 5.79\").    Weight as of 4/3/23: 73 kg (161 lb).         MD DORIE [facility] [place]   Frank is a 28 year old, presenting for the following health issues:  Recheck Medication and Follow Up        12/26/2023     1:42 PM   Additional Questions   Roomed by Alka OSHEA       History of Present Illness       Reason for visit:  Med recheck    He eats 2-3 servings of fruits and vegetables daily.He consumes 0 sweetened beverage(s) daily.He exercises with enough effort to increase his heart rate 9 or less minutes per day.  He exercises with enough effort to increase his heart rate 3 or less days per week.   He is taking medications regularly.       Follow-up on ADHD.  Symptoms well-managed.  Patient is on 20 mg dose.  He uses it about 4 to 5 days a week.  Medication works well for " him.  He would like to continue the use of medication        Review of Systems   CONSTITUTIONAL: NEGATIVE for fever, chills, change in weight  ENT/MOUTH: NEGATIVE for ear, mouth and throat problems  RESP: NEGATIVE for significant cough or SOB  CV: NEGATIVE for chest pain or peripheral edema      Objective           Vitals:  No vitals were obtained today due to virtual visit.    Physical Exam   GENERAL: Healthy, alert and no distress  EYES: Eyes grossly normal to inspection.  No discharge or erythema, or obvious scleral/conjunctival abnormalities.  RESP: No audible wheeze, cough, or visible cyanosis.  No visible retractions or increased work of breathing.    SKIN: Visible skin clear. No significant rash, abnormal pigmentation or lesions.  NEURO: Cranial nerves grossly intact.  Mentation and speech appropriate for age.  PSYCH: Mentation appears normal, affect normal/bright, judgement and insight intact, normal speech and appearance well-groomed.                Video-Visit Details    Type of service:  Video Visit     Originating Location (pt. Location): Home    Distant Location (provider location):  On-site  Platform used for Video Visit: Sanjay

## 2024-03-04 ENCOUNTER — MYC REFILL (OUTPATIENT)
Dept: FAMILY MEDICINE | Facility: CLINIC | Age: 29
End: 2024-03-04
Payer: COMMERCIAL

## 2024-03-04 DIAGNOSIS — F90.9 ATTENTION DEFICIT HYPERACTIVITY DISORDER (ADHD), UNSPECIFIED ADHD TYPE: ICD-10-CM

## 2024-03-04 RX ORDER — DEXTROAMPHETAMINE SACCHARATE, AMPHETAMINE ASPARTATE, DEXTROAMPHETAMINE SULFATE AND AMPHETAMINE SULFATE 5; 5; 5; 5 MG/1; MG/1; MG/1; MG/1
20 TABLET ORAL DAILY
Qty: 30 TABLET | Refills: 0 | Status: SHIPPED | OUTPATIENT
Start: 2024-03-04 | End: 2024-04-09

## 2024-04-09 ENCOUNTER — MYC REFILL (OUTPATIENT)
Dept: FAMILY MEDICINE | Facility: CLINIC | Age: 29
End: 2024-04-09
Payer: COMMERCIAL

## 2024-04-09 DIAGNOSIS — F90.9 ATTENTION DEFICIT HYPERACTIVITY DISORDER (ADHD), UNSPECIFIED ADHD TYPE: ICD-10-CM

## 2024-04-09 RX ORDER — DEXTROAMPHETAMINE SACCHARATE, AMPHETAMINE ASPARTATE, DEXTROAMPHETAMINE SULFATE AND AMPHETAMINE SULFATE 5; 5; 5; 5 MG/1; MG/1; MG/1; MG/1
20 TABLET ORAL DAILY
Qty: 30 TABLET | Refills: 0 | Status: SHIPPED | OUTPATIENT
Start: 2024-04-09 | End: 2024-06-07

## 2024-06-07 ENCOUNTER — MYC REFILL (OUTPATIENT)
Dept: FAMILY MEDICINE | Facility: CLINIC | Age: 29
End: 2024-06-07
Payer: COMMERCIAL

## 2024-06-07 DIAGNOSIS — F90.9 ATTENTION DEFICIT HYPERACTIVITY DISORDER (ADHD), UNSPECIFIED ADHD TYPE: ICD-10-CM

## 2024-06-09 RX ORDER — DEXTROAMPHETAMINE SACCHARATE, AMPHETAMINE ASPARTATE, DEXTROAMPHETAMINE SULFATE AND AMPHETAMINE SULFATE 5; 5; 5; 5 MG/1; MG/1; MG/1; MG/1
20 TABLET ORAL DAILY
Qty: 30 TABLET | Refills: 0 | Status: SHIPPED | OUTPATIENT
Start: 2024-06-09 | End: 2024-07-31

## 2024-07-31 ENCOUNTER — MYC REFILL (OUTPATIENT)
Dept: FAMILY MEDICINE | Facility: CLINIC | Age: 29
End: 2024-07-31
Payer: COMMERCIAL

## 2024-07-31 DIAGNOSIS — F90.9 ATTENTION DEFICIT HYPERACTIVITY DISORDER (ADHD), UNSPECIFIED ADHD TYPE: ICD-10-CM

## 2024-08-04 RX ORDER — DEXTROAMPHETAMINE SACCHARATE, AMPHETAMINE ASPARTATE, DEXTROAMPHETAMINE SULFATE AND AMPHETAMINE SULFATE 5; 5; 5; 5 MG/1; MG/1; MG/1; MG/1
20 TABLET ORAL DAILY
Qty: 30 TABLET | Refills: 0 | Status: SHIPPED | OUTPATIENT
Start: 2024-08-04 | End: 2024-09-20

## 2024-09-20 ENCOUNTER — MYC REFILL (OUTPATIENT)
Dept: FAMILY MEDICINE | Facility: CLINIC | Age: 29
End: 2024-09-20
Payer: COMMERCIAL

## 2024-09-20 DIAGNOSIS — F90.9 ATTENTION DEFICIT HYPERACTIVITY DISORDER (ADHD), UNSPECIFIED ADHD TYPE: ICD-10-CM

## 2024-09-23 RX ORDER — DEXTROAMPHETAMINE SACCHARATE, AMPHETAMINE ASPARTATE, DEXTROAMPHETAMINE SULFATE AND AMPHETAMINE SULFATE 5; 5; 5; 5 MG/1; MG/1; MG/1; MG/1
20 TABLET ORAL DAILY
Qty: 30 TABLET | Refills: 0 | Status: SHIPPED | OUTPATIENT
Start: 2024-09-23

## 2024-09-26 NOTE — TELEPHONE ENCOUNTER
Patient read mychart message on 9/24/24. Is aware medications sent to pharmacy and appointment with PCP is needed.     Encounter is done.     Loan   Visit Facilitator

## 2024-11-06 ENCOUNTER — MYC REFILL (OUTPATIENT)
Dept: FAMILY MEDICINE | Facility: CLINIC | Age: 29
End: 2024-11-06
Payer: COMMERCIAL

## 2024-11-06 DIAGNOSIS — F90.9 ATTENTION DEFICIT HYPERACTIVITY DISORDER (ADHD), UNSPECIFIED ADHD TYPE: ICD-10-CM

## 2024-11-06 RX ORDER — DEXTROAMPHETAMINE SACCHARATE, AMPHETAMINE ASPARTATE, DEXTROAMPHETAMINE SULFATE AND AMPHETAMINE SULFATE 5; 5; 5; 5 MG/1; MG/1; MG/1; MG/1
20 TABLET ORAL DAILY
Qty: 30 TABLET | Refills: 0 | Status: SHIPPED | OUTPATIENT
Start: 2024-11-06

## 2024-11-06 NOTE — TELEPHONE ENCOUNTER
Patient is due for ADD follow up with VV    Jacob Ambrose MD  [facility], [place]

## 2024-11-12 ENCOUNTER — VIRTUAL VISIT (OUTPATIENT)
Dept: FAMILY MEDICINE | Facility: CLINIC | Age: 29
End: 2024-11-12
Payer: COMMERCIAL

## 2024-11-12 DIAGNOSIS — F90.9 ATTENTION DEFICIT HYPERACTIVITY DISORDER (ADHD), UNSPECIFIED ADHD TYPE: Primary | ICD-10-CM

## 2024-11-12 PROCEDURE — G2211 COMPLEX E/M VISIT ADD ON: HCPCS | Mod: 95 | Performed by: FAMILY MEDICINE

## 2024-11-12 PROCEDURE — 99214 OFFICE O/P EST MOD 30 MIN: CPT | Mod: 95 | Performed by: FAMILY MEDICINE

## 2024-11-12 NOTE — PROGRESS NOTES
Frank is a 29 year old who is being evaluated via a billable video visit.    How would you like to obtain your AVS? PanÃ¨ve  If the video visit is dropped, the invitation should be resent by: Text to cell phone: [phone]  Will anyone else be joining your video visit? No      Assessment & Plan     Attention deficit hyperactivity disorder (ADHD), unspecified ADHD type    Patient has been using Adderall 20 mg daily once a day.  He tells that usually last for 4 to 5 hours then wears off.  He does notice appetite suppression for the next few more hours after the effect of Adderall is worn out.  He does say that once in a while he needs to study longer or do some more work-related projects for which he would like to take a little more dose.  I recommended that he could try taking an extra 10 mg Adderall on those days if he needs extra help.  He could take it later in the day or 30 mg altogether.  He will try it for the next few days and let me know by messaging through PanÃ¨ve on how his experience was.    He was given a prescription refill a few days ago.  Follow-up in 6 months.  He is overdue for annual exam.  He will plan on scheduling that as well    The longitudinal plan of care for the diagnosis(es)/condition(s) as documented were addressed during this visit. Due to the added complexity in care, I will continue to support Frank in the subsequent management and with ongoing continuity of care.      Subjective   Frank is a 29 year old, presenting for the following health issues:  Recheck Medication        11/12/2024     3:18 PM   Additional Questions   Roomed by Kristofer OSHEA     History of Present Illness       Reason for visit:  Adderall follow up He is missing 3 dose(s) of medications per week.  He is not taking prescribed medications regularly due to other.       Medication Followup of Adderall  Taking Medication as prescribed: Taking only 4 days per week, work days   Side Effects:  None  Medication Helping Symptoms:   yes        Review of Systems  CONSTITUTIONAL: NEGATIVE for fever, chills, change in weight  ENT/MOUTH: NEGATIVE for ear, mouth and throat problems  RESP: NEGATIVE for significant cough or SOB  CV: NEGATIVE for chest pain, palpitations or peripheral edema      Objective           Vitals:  No vitals were obtained today due to virtual visit.    Physical Exam   GENERAL: alert and no distress  EYES: Eyes grossly normal to inspection.  No discharge or erythema, or obvious scleral/conjunctival abnormalities.  RESP: No audible wheeze, cough, or visible cyanosis.    SKIN: Visible skin clear. No significant rash, abnormal pigmentation or lesions.  NEURO: Cranial nerves grossly intact.  Mentation and speech appropriate for age.  PSYCH: Appropriate affect, tone, and pace of words          Video-Visit Details    Type of service:  Video Visit   Originating Location (pt. Location): Home    Distant Location (provider location):  On-site  Platform used for Video Visit: Sanjay  Signed Electronically by: Jacob Ambrose MD

## 2025-01-01 ENCOUNTER — MYC REFILL (OUTPATIENT)
Dept: FAMILY MEDICINE | Facility: CLINIC | Age: 30
End: 2025-01-01
Payer: COMMERCIAL

## 2025-01-01 DIAGNOSIS — F90.9 ATTENTION DEFICIT HYPERACTIVITY DISORDER (ADHD), UNSPECIFIED ADHD TYPE: ICD-10-CM

## 2025-01-02 RX ORDER — DEXTROAMPHETAMINE SACCHARATE, AMPHETAMINE ASPARTATE, DEXTROAMPHETAMINE SULFATE AND AMPHETAMINE SULFATE 5; 5; 5; 5 MG/1; MG/1; MG/1; MG/1
20 TABLET ORAL DAILY
Qty: 30 TABLET | Refills: 0 | Status: SHIPPED | OUTPATIENT
Start: 2025-01-02

## 2025-01-14 ENCOUNTER — OFFICE VISIT (OUTPATIENT)
Dept: FAMILY MEDICINE | Facility: CLINIC | Age: 30
End: 2025-01-14
Payer: COMMERCIAL

## 2025-01-14 VITALS
HEART RATE: 99 BPM | HEIGHT: 67 IN | BODY MASS INDEX: 24.33 KG/M2 | RESPIRATION RATE: 22 BRPM | DIASTOLIC BLOOD PRESSURE: 84 MMHG | TEMPERATURE: 97.4 F | WEIGHT: 155 LBS | OXYGEN SATURATION: 98 % | SYSTOLIC BLOOD PRESSURE: 120 MMHG

## 2025-01-14 DIAGNOSIS — K76.0 FATTY LIVER: ICD-10-CM

## 2025-01-14 DIAGNOSIS — F41.0 ANXIETY ATTACK: ICD-10-CM

## 2025-01-14 DIAGNOSIS — F41.9 MODERATE ANXIETY: ICD-10-CM

## 2025-01-14 DIAGNOSIS — R03.0 ELEVATED BLOOD PRESSURE READING WITHOUT DIAGNOSIS OF HYPERTENSION: ICD-10-CM

## 2025-01-14 DIAGNOSIS — F90.9 ATTENTION DEFICIT HYPERACTIVITY DISORDER (ADHD), UNSPECIFIED ADHD TYPE: ICD-10-CM

## 2025-01-14 DIAGNOSIS — Z83.3 FAMILY HISTORY OF DIABETES MELLITUS: ICD-10-CM

## 2025-01-14 DIAGNOSIS — Z00.00 ROUTINE GENERAL MEDICAL EXAMINATION AT A HEALTH CARE FACILITY: Primary | ICD-10-CM

## 2025-01-14 LAB
ERYTHROCYTE [DISTWIDTH] IN BLOOD BY AUTOMATED COUNT: 11.5 % (ref 10–15)
EST. AVERAGE GLUCOSE BLD GHB EST-MCNC: 114 MG/DL
HBA1C MFR BLD: 5.6 % (ref 0–5.6)
HCT VFR BLD AUTO: 45.6 % (ref 40–53)
HGB BLD-MCNC: 15.9 G/DL (ref 13.3–17.7)
MCH RBC QN AUTO: 28.9 PG (ref 26.5–33)
MCHC RBC AUTO-ENTMCNC: 34.9 G/DL (ref 31.5–36.5)
MCV RBC AUTO: 83 FL (ref 78–100)
PLATELET # BLD AUTO: 264 10E3/UL (ref 150–450)
RBC # BLD AUTO: 5.5 10E6/UL (ref 4.4–5.9)
WBC # BLD AUTO: 7.9 10E3/UL (ref 4–11)

## 2025-01-14 PROCEDURE — 99395 PREV VISIT EST AGE 18-39: CPT | Performed by: FAMILY MEDICINE

## 2025-01-14 PROCEDURE — 80053 COMPREHEN METABOLIC PANEL: CPT | Performed by: FAMILY MEDICINE

## 2025-01-14 PROCEDURE — 36415 COLL VENOUS BLD VENIPUNCTURE: CPT | Performed by: FAMILY MEDICINE

## 2025-01-14 PROCEDURE — 84443 ASSAY THYROID STIM HORMONE: CPT | Performed by: FAMILY MEDICINE

## 2025-01-14 PROCEDURE — 85027 COMPLETE CBC AUTOMATED: CPT | Performed by: FAMILY MEDICINE

## 2025-01-14 PROCEDURE — 83036 HEMOGLOBIN GLYCOSYLATED A1C: CPT | Performed by: FAMILY MEDICINE

## 2025-01-14 PROCEDURE — 99213 OFFICE O/P EST LOW 20 MIN: CPT | Mod: 25 | Performed by: FAMILY MEDICINE

## 2025-01-14 RX ORDER — PROPRANOLOL HYDROCHLORIDE 10 MG/1
10 TABLET ORAL DAILY PRN
Qty: 30 TABLET | Refills: 2 | Status: SHIPPED | OUTPATIENT
Start: 2025-01-14 | End: 2025-01-16

## 2025-01-14 SDOH — HEALTH STABILITY: PHYSICAL HEALTH: ON AVERAGE, HOW MANY DAYS PER WEEK DO YOU ENGAGE IN MODERATE TO STRENUOUS EXERCISE (LIKE A BRISK WALK)?: 4 DAYS

## 2025-01-14 ASSESSMENT — ANXIETY QUESTIONNAIRES
GAD7 TOTAL SCORE: 12
IF YOU CHECKED OFF ANY PROBLEMS ON THIS QUESTIONNAIRE, HOW DIFFICULT HAVE THESE PROBLEMS MADE IT FOR YOU TO DO YOUR WORK, TAKE CARE OF THINGS AT HOME, OR GET ALONG WITH OTHER PEOPLE: NOT DIFFICULT AT ALL
2. NOT BEING ABLE TO STOP OR CONTROL WORRYING: MORE THAN HALF THE DAYS
3. WORRYING TOO MUCH ABOUT DIFFERENT THINGS: MORE THAN HALF THE DAYS
7. FEELING AFRAID AS IF SOMETHING AWFUL MIGHT HAPPEN: MORE THAN HALF THE DAYS
GAD7 TOTAL SCORE: 12
1. FEELING NERVOUS, ANXIOUS, OR ON EDGE: MORE THAN HALF THE DAYS
5. BEING SO RESTLESS THAT IT IS HARD TO SIT STILL: NOT AT ALL
6. BECOMING EASILY ANNOYED OR IRRITABLE: MORE THAN HALF THE DAYS

## 2025-01-14 ASSESSMENT — SOCIAL DETERMINANTS OF HEALTH (SDOH): HOW OFTEN DO YOU GET TOGETHER WITH FRIENDS OR RELATIVES?: ONCE A WEEK

## 2025-01-14 ASSESSMENT — PAIN SCALES - GENERAL: PAINLEVEL_OUTOF10: MODERATE PAIN (5)

## 2025-01-14 ASSESSMENT — PATIENT HEALTH QUESTIONNAIRE - PHQ9: 5. POOR APPETITE OR OVEREATING: MORE THAN HALF THE DAYS

## 2025-01-14 NOTE — LETTER
[facility]  01/14/25  Patient: Denver Brewer  YOB: 1995  Medical Record Number: [id number]                                                                                  Non-Opioid Controlled Substance Agreement    This is an agreement between you and your provider regarding safe and appropriate use of controlled substances prescribed by your care team. Controlled substances are?medicines that can cause physical and mental dependence (abuse).     There are strict laws about having and using these medicines. We here at [facility] are  committed to working with you in your efforts to get better. To support you in this work, we'll help you schedule regular office appointments for medicine refills. If we must cancel or change your appointment for any reason, we'll make sure you have enough medicine to last until your next appointment.     As a Provider, I will:   Listen carefully to your concerns while treating you with respect.   Recommend a treatment plan that I believe is in your best interest and may involve therapies other than medicine.    Talk with you often about the possible benefits and the risk of harm of any medicine that we prescribe for you.  Assess the safety of this medicine and check how well it works.    Provide a plan on how to taper (discontinue or go off) using this medicine if the decision is made to stop its use.      ::  As a Patient, I understand controlled substances:     Are prescribed by my care provider to help me function or work and manage my condition(s).?  Are strong medicines and can cause serious side effects.     Need to be taken exactly as prescribed.?Combining controlled substances with certain medicines or chemicals (such as illegal drugs, alcohol, sedatives, sleeping pills, and benzodiazepines) can be dangerous or even fatal.? If I stop taking my medicines suddenly, I may have severe withdrawal symptoms.     The risks, benefits, and side  effects of these medicine(s) were explained to me. I agree that:    I will take part in other treatments as advised by my care team. This may be psychiatry or counseling, physical therapy, behavioral therapy, group treatment or a referral to specialist.    I will keep all my appointments and understand this is part of the monitoring of controlled substances.?My care team may require an office visit for EVERY controlled substance refill. If I miss appointments or don t follow instructions, my care team may stop my medicine    I will take my medicines as prescribed. I will not change the dose or schedule unless my care team tells me to. There will be no refills if I run out early.      I may be asked to come to the clinic and complete a urine drug test or complete a pill count. If I don t give a urine sample or participate in a pill count, the care team may stop my medicine.    I will only receive controlled substance prescriptions from this clinic. If I am treated by another provider, I will tell them that I am taking controlled substances and that I have a treatment agreement with this provider. I will inform my [facility] care team within one business day if I am given a prescription for any controlled substance by another healthcare provider. My [facility] care team can contact other providers and pharmacists about my use of any medicines.    It is up to me to make sure that I don't run out of my medicines on weekends or holidays.?If my care team is willing to refill my prescription without a visit, I must request refills only during office hours. Refills may take up to 3 business days to process. I will use one pharmacy to fill all my controlled substance prescriptions. I will notify the clinic about any changes to my insurance or medicine availability.    I am responsible for my prescriptions. If the medicine/prescription is lost, stolen or destroyed, it will not be replaced.?I also agree not to  share controlled substance medicines with anyone.     I am aware I should not use any illegal or recreational drugs. I agree not to drink alcohol unless my care team says I can.     If I enroll in the [place] Medical Cannabis program, I will tell my care team before my next refill.    I will tell my care team right away if I become pregnant, have a new medical problem treated outside of my regular clinic, or have a change in my medicines.     I understand that this medicine can affect my thinking, judgment and reaction time.? Alcohol and drugs affect the brain and body, which can affect the safety of my driving. Being under the influence of alcohol or drugs can affect my decision-making, behaviors, personal safety and the safety of others. Driving while impaired (DWI) can occur if a person is driving, operating or in physical control of a car, motorcycle, boat, snowmobile, ATV, motorbike, off-road vehicle or any other motor vehicle ([place] Statute 169A.20). I understand the risk if I choose to drive or operate any vehicle or machinery.    I understand that if I do not follow any of the conditions above, my prescriptions or treatment may be stopped or changed.   I agree that my provider, clinic care team and pharmacy may work with any city, state or federal law enforcement agency that investigates the misuse, sale or other diversion of my controlled medicine. I will allow my provider to discuss my care with, or share a copy of, this agreement with any other treating provider, pharmacy or emergency room where I receive care.     I have read this agreement and have asked questions about anything I did not understand.    ________________________________________________________  Patient Signature - Denver Brewer     ___________________                   Date     ________________________________________________________  Provider Signature - Elsie Wellington MD       ___________________                   Date      ________________________________________________________  Witness Signature (required if provider not present while patient signing)          ___________________                   Date

## 2025-01-14 NOTE — PATIENT INSTRUCTIONS
Patient Education   Preventive Care Advice   This is general advice given by our system to help you stay healthy. However, your care team may have specific advice just for you. Please talk to your care team about your preventive care needs.  Nutrition  Eat 5 or more servings of fruits and vegetables each day.  Try wheat bread, brown rice and whole grain pasta (instead of white bread, rice, and pasta).  Get enough calcium and vitamin D. Check the label on foods and aim for 100% of the RDA (recommended daily allowance).  Lifestyle  Exercise at least 150 minutes each week  (30 minutes a day, 5 days a week).  Do muscle strengthening activities 2 days a week. These help control your weight and prevent disease.  No smoking.  Wear sunscreen to prevent skin cancer.  Have a dental exam and cleaning every 6 months.  Yearly exams  See your health care team every year to talk about:  Any changes in your health.  Any medicines your care team has prescribed.  Preventive care, family planning, and ways to prevent chronic diseases.  Shots (vaccines)   HPV shots (up to age 26), if you've never had them before.  Hepatitis B shots (up to age 59), if you've never had them before.  COVID-19 shot: Get this shot when it's due.  Flu shot: Get a flu shot every year.  Tetanus shot: Get a tetanus shot every 10 years.  Pneumococcal, hepatitis A, and RSV shots: Ask your care team if you need these based on your risk.  Shingles shot (for age 50 and up)  General health tests  Diabetes screening:  Starting at age 35, Get screened for diabetes at least every 3 years.  If you are younger than age 35, ask your care team if you should be screened for diabetes.  Cholesterol test: At age 39, start having a cholesterol test every 5 years, or more often if advised.  Bone density scan (DEXA): At age 50, ask your care team if you should have this scan for osteoporosis (brittle bones).  Hepatitis C: Get tested at least once in your life.  STIs (sexually  transmitted infections)  Before age 24: Ask your care team if you should be screened for STIs.  After age 24: Get screened for STIs if you're at risk. You are at risk for STIs (including HIV) if:  You are sexually active with more than one person.  You don't use condoms every time.  You or a partner was diagnosed with a sexually transmitted infection.  If you are at risk for HIV, ask about PrEP medicine to prevent HIV.  Get tested for HIV at least once in your life, whether you are at risk for HIV or not.  Cancer screening tests  Cervical cancer screening: If you have a cervix, begin getting regular cervical cancer screening tests starting at age 21.  Breast cancer scan (mammogram): If you've ever had breasts, begin having regular mammograms starting at age 40. This is a scan to check for breast cancer.  Colon cancer screening: It is important to start screening for colon cancer at age 45.  Have a colonoscopy test every 10 years (or more often if you're at risk) Or, ask your provider about stool tests like a FIT test every year or Cologuard test every 3 years.  To learn more about your testing options, visit:   .  For help making a decision, visit:   https://bit.ly/ou98293.  Prostate cancer screening test: If you have a prostate, ask your care team if a prostate cancer screening test (PSA) at age 55 is right for you.  Lung cancer screening: If you are a current or former smoker ages 50 to 80, ask your care team if ongoing lung cancer screenings are right for you.  For informational purposes only. Not to replace the advice of your health care provider. Copyright   2023 [employer]. All rights reserved. Clinically reviewed by the [facility]. [employer] 340228 - REV 01/24.

## 2025-01-14 NOTE — PROGRESS NOTES
Preventive Care Visit  [facility]  Elsie Wellington MD, Family Medicine  Jan 14, 2025      Assessment & Plan     1. Routine general medical examination at a health care facility (Primary)    2. Family history of diabetes mellitus  Dad has type 2 Diabetes    - Hemoglobin A1c  Lab Results   Component Value Date    A1C 5.6 01/14/2025      3. Fatty liver  Plan: known history of fatty liver  Labs and ultrasound advised    - Comprehensive metabolic panel (BMP + Alb, Alk Phos, ALT, AST, Total. Bili, TP); Future  - Comprehensive metabolic panel (BMP + Alb, Alk Phos, ALT, AST, Total. Bili, TP)    4. Elevated blood pressure reading without diagnosis of hypertension  Plan: Please see patient instructions   - CBC with platelets; Future  - TSH with free T4 reflex; Future  - CBC with platelets  - TSH with free T4 reflex    5. Attention deficit hyperactivity disorder (ADHD), unspecified ADHD type  Plan: Controlled substance agreement is signed  Will review refill in follow up  Takes adderall only as needed  when works  - rveiewed today- no concerns     6. Anxiety attack  Plan:- propranolol (INDERAL) 10 MG tablet; Take 1 tablet (10 mg) by mouth daily as needed (situational anxiety).  Dispense: 30 tablet; Refill: 2    Moderate anxiety  Has therapist  Long discussion about benefit of starting long term medications selective serotonin reuptake inhibitor/snri- and will follow up on video if wants to consider it      10/27/2020     5:35 PM 12/3/2020     6:01 PM 1/14/2025     5:25 PM   RAINER-7 SCORE   Total Score  8 (mild anxiety)    Total Score 5 8 12          Patient has been advised of split billing requirements and indicates understanding: Yes        Counseling  Appropriate preventive services were addressed with this patient via screening, questionnaire, or discussion as appropriate for fall prevention, nutrition, physical activity, Tobacco-use cessation, social engagement, weight loss and cognition.   Checklist reviewing preventive services available has been given to the patient.  Reviewed patient's diet, addressing concerns and/or questions.       Work on weight loss  Regular exercise    Kusum Marie is a 29 year old, presenting for the following:  Physical (Cyst/boil around waist line  recurring )        1/14/2025     4:30 PM   Additional Questions   Roomed by bonita mccall   Accompanied by caitlyn         1/14/2025     4:30 PM   Patient Reported Additional Medications   Patient reports taking the following new medications n/a          HPI  In college - in [place]- had ultrasound and was told he had fatty liver  No alcohol use , he think its related to eating extra cabs. Never has had concerns with hepatitis.    Does endorse a lot of anxiety.  Has a therapist.  Wondered about medications but not really  Sometimes palpitations on and off.  Denies chest pain, shortness of breath    Diagnosed attention deficit hyperactivity disorder since high school  Takes adderall 20 mg IR only as needed because it affects appetite and uses only as needed .  He called his mom about vaccinations and she reports he is Up to date  on HEPATITIS B VIRUS  series as well             Health Care Directive  Patient does not have a Health Care Directive: Discussed advance care planning with patient; information given to patient to review.      1/14/2025   General Health   How would you rate your overall physical health? Good   Feel stress (tense, anxious, or unable to sleep) Not at all         1/14/2025   Nutrition   Three or more servings of calcium each day? (!) NO   Diet: Regular (no restrictions)   How many servings of fruit and vegetables per day? (!) 0-1   How many sweetened beverages each day? 0-1         1/14/2025   Exercise   Days per week of moderate/strenous exercise 4 days         1/14/2025   Social Factors   Frequency of gathering with friends or relatives Once a week   Worry food won't last until get money to buy more No    Food not last or not have enough money for food? No   Do you have housing? (Housing is defined as stable permanent housing and does not include staying ouside in a car, in a tent, in an abandoned building, in an overnight shelter, or couch-surfing.) Yes   Are you worried about losing your housing? No   Lack of transportation? No   Unable to get utilities (heat,electricity)? Yes   Want help with housing or utility concern? No   (!) FINANCIAL RESOURCE STRAIN CONCERN      1/14/2025   Dental   Dentist two times every year? Yes         1/14/2025   TB Screening   Were you born outside of the [place]? Yes/ [place]            Today's PHQ-2 Score:       1/14/2025     4:41 PM   PHQ-2 ( 1999 [employer])   Q1: Little interest or pleasure in doing things 0   Q2: Feeling down, depressed or hopeless 0   PHQ-2 Score 0    Q1: Little interest or pleasure in doing things Not at all   Q2: Feeling down, depressed or hopeless Not at all   PHQ-2 Score 0       Patient-reported         1/14/2025   Substance Use   Alcohol more than 3/day or more than 7/wk Not Applicable   Do you use any other substances recreationally? No     Social History     Tobacco Use    Smoking status: Never     Passive exposure: Never    Smokeless tobacco: Never   Vaping Use    Vaping status: Never Used   Substance Use Topics    Alcohol use: Yes     Comment: rare    Drug use: No           1/14/2025   STI Screening   New sexual partner(s) since last STI/HIV test? No         1/14/2025   Contraception/Family Planning   Questions about contraception or family planning No        Reviewed and updated as needed this visit by Provider   Tobacco  Allergies  Meds  Problems  Med Hx  Surg Hx  Fam Hx            BP Readings from Last 3 Encounters:   01/14/25 120/84   04/03/23 129/87   02/21/22 122/82    Wt Readings from Last 3 Encounters:   01/14/25 70.3 kg (155 lb)   04/03/23 73 kg (161 lb)   02/21/22 75.5 kg (166 lb 6.4 oz)                      Review of  "Systems  Constitutional, HEENT, cardiovascular, pulmonary, GI, , musculoskeletal, neuro, skin, endocrine and psych systems are negative, except as otherwise noted.     Objective    Exam  /78 (BP Location: Left arm, Patient Position: Sitting, Cuff Size: Adult Regular)   Pulse 99   Temp 97.4  F (36.3  C) (Temporal)   Resp 22   Ht 1.7 m (5' 6.93\")   Wt 70.3 kg (155 lb)   SpO2 98%   BMI 24.33 kg/m     Estimated body mass index is 24.33 kg/m  as calculated from the following:    Height as of this encounter: 1.7 m (5' 6.93\").    Weight as of this encounter: 70.3 kg (155 lb).    Physical Exam  GENERAL: alert and no distress  EYES: Eyes grossly normal to inspection, PERRL and conjunctivae and sclerae normal  HENT: ear canals and TM's normal, nose and mouth without ulcers or lesions  NECK: no adenopathy, no asymmetry, masses, or scars  RESP: lungs clear to auscultation - no rales, rhonchi or wheezes  CV: regular rate and rhythm, normal S1 S2, no S3 or S4, no murmur, click or rub, no peripheral edema  ABDOMEN: soft, nontender, no hepatosplenomegaly, no masses and bowel sounds normal  MS: no gross musculoskeletal defects noted, no edema  SKIN: no suspicious lesions or rashes  NEURO: Normal strength and tone, mentation intact and speech normal  PSYCH: mentation appears normal, affect normal/bright        Signed Electronically by: Elsie Wellington MD    "

## 2025-01-15 LAB
ALBUMIN SERPL BCG-MCNC: 4.7 G/DL (ref 3.5–5.2)
ALP SERPL-CCNC: 88 U/L (ref 40–150)
ALT SERPL W P-5'-P-CCNC: 65 U/L (ref 0–70)
ANION GAP SERPL CALCULATED.3IONS-SCNC: 9 MMOL/L (ref 7–15)
AST SERPL W P-5'-P-CCNC: 35 U/L (ref 0–45)
BILIRUB SERPL-MCNC: 0.4 MG/DL
BUN SERPL-MCNC: 12.5 MG/DL (ref 6–20)
CALCIUM SERPL-MCNC: 10.1 MG/DL (ref 8.8–10.4)
CHLORIDE SERPL-SCNC: 104 MMOL/L (ref 98–107)
CREAT SERPL-MCNC: 1.1 MG/DL (ref 0.67–1.17)
EGFRCR SERPLBLD CKD-EPI 2021: >90 ML/MIN/1.73M2
GLUCOSE SERPL-MCNC: 95 MG/DL (ref 70–99)
HCO3 SERPL-SCNC: 29 MMOL/L (ref 22–29)
POTASSIUM SERPL-SCNC: 4.6 MMOL/L (ref 3.4–5.3)
PROT SERPL-MCNC: 7.3 G/DL (ref 6.4–8.3)
SODIUM SERPL-SCNC: 142 MMOL/L (ref 135–145)
TSH SERPL DL<=0.005 MIU/L-ACNC: 1.16 UIU/ML (ref 0.3–4.2)

## 2025-01-16 RX ORDER — PROPRANOLOL HYDROCHLORIDE 10 MG/1
TABLET ORAL
Qty: 90 TABLET | Refills: 0 | Status: SHIPPED | OUTPATIENT
Start: 2025-01-16

## 2025-01-28 ENCOUNTER — VIRTUAL VISIT (OUTPATIENT)
Dept: FAMILY MEDICINE | Facility: CLINIC | Age: 30
End: 2025-01-28
Payer: COMMERCIAL

## 2025-01-28 DIAGNOSIS — F41.9 ANXIETY: ICD-10-CM

## 2025-01-28 DIAGNOSIS — F90.9 ATTENTION DEFICIT HYPERACTIVITY DISORDER (ADHD), UNSPECIFIED ADHD TYPE: Primary | ICD-10-CM

## 2025-01-28 PROCEDURE — 98005 SYNCH AUDIO-VIDEO EST LOW 20: CPT | Performed by: FAMILY MEDICINE

## 2025-01-28 RX ORDER — DEXTROAMPHETAMINE SACCHARATE, AMPHETAMINE ASPARTATE, DEXTROAMPHETAMINE SULFATE AND AMPHETAMINE SULFATE 5; 5; 5; 5 MG/1; MG/1; MG/1; MG/1
20 TABLET ORAL DAILY
Qty: 30 TABLET | Refills: 0 | Status: SHIPPED | OUTPATIENT
Start: 2025-01-28 | End: 2025-02-27

## 2025-01-28 RX ORDER — DEXTROAMPHETAMINE SACCHARATE, AMPHETAMINE ASPARTATE, DEXTROAMPHETAMINE SULFATE AND AMPHETAMINE SULFATE 5; 5; 5; 5 MG/1; MG/1; MG/1; MG/1
20 TABLET ORAL DAILY
Qty: 30 TABLET | Refills: 0 | Status: SHIPPED | OUTPATIENT
Start: 2025-03-29 | End: 2025-04-28

## 2025-01-28 RX ORDER — DEXTROAMPHETAMINE SACCHARATE, AMPHETAMINE ASPARTATE, DEXTROAMPHETAMINE SULFATE AND AMPHETAMINE SULFATE 5; 5; 5; 5 MG/1; MG/1; MG/1; MG/1
20 TABLET ORAL DAILY
Qty: 30 TABLET | Refills: 0 | Status: SHIPPED | OUTPATIENT
Start: 2025-02-27 | End: 2025-03-29

## 2025-01-28 ASSESSMENT — ANXIETY QUESTIONNAIRES
8. IF YOU CHECKED OFF ANY PROBLEMS, HOW DIFFICULT HAVE THESE MADE IT FOR YOU TO DO YOUR WORK, TAKE CARE OF THINGS AT HOME, OR GET ALONG WITH OTHER PEOPLE?: NOT DIFFICULT AT ALL
6. BECOMING EASILY ANNOYED OR IRRITABLE: SEVERAL DAYS
GAD7 TOTAL SCORE: 4
GAD7 TOTAL SCORE: 4
3. WORRYING TOO MUCH ABOUT DIFFERENT THINGS: SEVERAL DAYS
IF YOU CHECKED OFF ANY PROBLEMS ON THIS QUESTIONNAIRE, HOW DIFFICULT HAVE THESE PROBLEMS MADE IT FOR YOU TO DO YOUR WORK, TAKE CARE OF THINGS AT HOME, OR GET ALONG WITH OTHER PEOPLE: NOT DIFFICULT AT ALL
4. TROUBLE RELAXING: NOT AT ALL
2. NOT BEING ABLE TO STOP OR CONTROL WORRYING: SEVERAL DAYS
5. BEING SO RESTLESS THAT IT IS HARD TO SIT STILL: NOT AT ALL
7. FEELING AFRAID AS IF SOMETHING AWFUL MIGHT HAPPEN: NOT AT ALL
7. FEELING AFRAID AS IF SOMETHING AWFUL MIGHT HAPPEN: NOT AT ALL
GAD7 TOTAL SCORE: 4
1. FEELING NERVOUS, ANXIOUS, OR ON EDGE: SEVERAL DAYS

## 2025-01-28 ASSESSMENT — PATIENT HEALTH QUESTIONNAIRE - PHQ9
SUM OF ALL RESPONSES TO PHQ QUESTIONS 1-9: 3
SUM OF ALL RESPONSES TO PHQ QUESTIONS 1-9: 3
10. IF YOU CHECKED OFF ANY PROBLEMS, HOW DIFFICULT HAVE THESE PROBLEMS MADE IT FOR YOU TO DO YOUR WORK, TAKE CARE OF THINGS AT HOME, OR GET ALONG WITH OTHER PEOPLE: NOT DIFFICULT AT ALL

## 2025-01-28 NOTE — PROGRESS NOTES
Frank is a 29 year old who is being evaluated via a billable video visit.    How would you like to obtain your AVS? MyChart  If the video visit is dropped, the invitation should be resent by: Send to e-mail at: [email or web address]  Will anyone else be joining your video visit? No      Assessment & Plan     Attention deficit hyperactivity disorder (ADHD), unspecified ADHD type    - amphetamine-dextroamphetamine (ADDERALL) 20 MG tablet; Take 1 tablet (20 mg) by mouth daily.  - amphetamine-dextroamphetamine (ADDERALL) 20 MG tablet; Take 1 tablet (20 mg) by mouth daily.  - amphetamine-dextroamphetamine (ADDERALL) 20 MG tablet; Take 1 tablet (20 mg) by mouth daily.      Patient is followed by KIRK HOWARD for ongoing prescription of stimulants.  All refills should be approved by this provider, or covering partner.    Medication(s): adderall 30 mg once daily .   Maximum quantity per month: 30  Clinic visit frequency required: Q 3 months   Ok for follow up in 6 months because he skipps days and may not need frequent viist  Next follow up can be as VV as well    Controlled substance agreement on file: Yes       Date(s): 1/14/25  Neuropsych evaluation for ADD completed:  No    Last [facility] website verification:  done on 1/28/25  [email or web address]     Anxiety and depression  Stable on excercise      12/3/2020     6:01 PM 1/14/2025     5:25 PM 1/28/2025     5:01 PM   RAINER-7 SCORE   Total Score 8 (mild anxiety)  4 (minimal anxiety)   Total Score 8 12 4        Patient-reported         10/27/2020     5:35 PM 12/3/2020     5:59 PM 1/28/2025     5:00 PM   PHQ   PHQ-9 Total Score 6 6 3    Q9: Thoughts of better off dead/self-harm past 2 weeks Not at all Not at all Not at all       Patient-reported             Subjective   Frank is a 29 year old, presenting for the following health issues:  No chief complaint on file.    History of presenting illness  Adderall refill- adderall takes it 4 or less days a  week  Skips intentionally when does not need it.  Denies appetite concerns or palpitations from  adderall , would like to continue the same        Does not think anxiety- causes panic attacks  Has not picked up propranolol yet    Anxiety / depression not a concern               Review of Systems  Constitutional, HEENT, cardiovascular, pulmonary, GI, , musculoskeletal, neuro, skin, endocrine and psych systems are negative, except as otherwise noted.      Objective           Vitals:  No vitals were obtained today due to virtual visit.    Physical Exam   GENERAL: alert and no distress  EYES: Eyes grossly normal to inspection.  No discharge or erythema, or obvious scleral/conjunctival abnormalities.  RESP: No audible wheeze, cough, or visible cyanosis.    SKIN: Visible skin clear. No significant rash, abnormal pigmentation or lesions.  NEURO: Cranial nerves grossly intact.  Mentation and speech appropriate for age.  PSYCH: Appropriate affect, tone, and pace of words          Video-Visit Details    Type of service:  Video Visit   Originating Location (pt. Location): Home    Distant Location (provider location):  On-site  Platform used for Video Visit: [employer]  Signed Electronically by: Elsie Wellington MD  Prescription drug management    Answers submitted by the patient for this visit:  Patient Health Questionnaire (Submitted on 1/28/2025)  If you checked off any problems, how difficult have these problems made it for you to do your work, take care of things at home, or get along with other people?: Not difficult at all  PHQ9 TOTAL SCORE: 3  Patient Health Questionnaire (G7) (Submitted on 1/28/2025)  RAINER 7 TOTAL SCORE: 4  General Questionnaire (Submitted on 1/28/2025)  Chief Complaint: Chronic problems general questions HPI Form  What is the reason for your visit today? : Doctor requested follow up  Questionnaire about: Chronic problems general questions HPI Form (Submitted on 1/28/2025)  Chief Complaint: Chronic  problems general questions HPI Form

## 2025-03-08 ENCOUNTER — MYC REFILL (OUTPATIENT)
Dept: FAMILY MEDICINE | Facility: CLINIC | Age: 30
End: 2025-03-08
Payer: COMMERCIAL

## 2025-03-08 DIAGNOSIS — F90.9 ATTENTION DEFICIT HYPERACTIVITY DISORDER (ADHD), UNSPECIFIED ADHD TYPE: ICD-10-CM

## 2025-03-08 DIAGNOSIS — F41.0 ANXIETY ATTACK: ICD-10-CM

## 2025-03-10 RX ORDER — PROPRANOLOL HYDROCHLORIDE 10 MG/1
TABLET ORAL
Qty: 90 TABLET | Refills: 0 | OUTPATIENT
Start: 2025-03-10

## 2025-03-10 RX ORDER — DEXTROAMPHETAMINE SACCHARATE, AMPHETAMINE ASPARTATE, DEXTROAMPHETAMINE SULFATE AND AMPHETAMINE SULFATE 5; 5; 5; 5 MG/1; MG/1; MG/1; MG/1
20 TABLET ORAL DAILY
Qty: 30 TABLET | Refills: 0 | OUTPATIENT
Start: 2025-03-10

## 2025-03-27 ENCOUNTER — OFFICE VISIT (OUTPATIENT)
Dept: FAMILY MEDICINE | Facility: CLINIC | Age: 30
End: 2025-03-27
Payer: COMMERCIAL

## 2025-03-27 VITALS
SYSTOLIC BLOOD PRESSURE: 137 MMHG | RESPIRATION RATE: 24 BRPM | HEIGHT: 66 IN | DIASTOLIC BLOOD PRESSURE: 82 MMHG | BODY MASS INDEX: 25.55 KG/M2 | OXYGEN SATURATION: 98 % | WEIGHT: 159 LBS | TEMPERATURE: 98.5 F | HEART RATE: 137 BPM

## 2025-03-27 DIAGNOSIS — L08.9 INFECTED CYST OF SKIN: ICD-10-CM

## 2025-03-27 DIAGNOSIS — L72.9 INFECTED CYST OF SKIN: ICD-10-CM

## 2025-03-27 DIAGNOSIS — R22.9 LOCALIZED SUPERFICIAL SWELLING, MASS, OR LUMP: Primary | ICD-10-CM

## 2025-03-27 RX ORDER — DOXYCYCLINE HYCLATE 100 MG
100 TABLET ORAL 2 TIMES DAILY
Qty: 14 TABLET | Refills: 0 | Status: SHIPPED | OUTPATIENT
Start: 2025-03-27 | End: 2025-04-03

## 2025-03-27 ASSESSMENT — PAIN SCALES - GENERAL: PAINLEVEL_OUTOF10: MODERATE PAIN (4)

## 2025-03-27 NOTE — PROGRESS NOTES
Assessment & Plan     Localized superficial swelling, mass, or lump  - doxycycline hyclate (VIBRA-TABS) 100 MG tablet; Take 1 tablet (100 mg) by mouth 2 times daily for 7 days.    Infected cyst of skin  Unlikely to be fungus given lack of scaly appearance.    Performed an I&D with patient consent today. Lesion had the appearance of an infected cyst. I cleansed the area with alcohol swab and numbed with 2% lidocaine with Epi. It was difficult to appreciate the cyst due to fluid, but I was able to extract about 3-5 mls of pustular drainage after making an incision several mm in size. Covered with a bandage. After I&D I was able to palpate what I believe to be a soft tissue cyst (perhaps sebaceous) on the right side of the scalp lesion.  Advised patient to do daily dressing changes and apply bacitracin.      Could watch its size over the next few days. If pain improves, but swelling area stays the same, it is likely consistent with sebaceous cyst. Advised of potential for cysts to get infected. Consider referral to derm surgery if swelling is persistent for consideration of removal.  Take full course of Doxycycline even if feeling better.  Could try warm-packing if cyst gets reinfected for draining it.      Subjective   Frank is a 29 year old, presenting for the following health issues:  1 month of skin tenderness/lump. Pushing will make it painful. Hair loss coming out. Very itchy.   He did switch shampoos a few months before then.    Derm Problem (Lump on scalp)      3/27/2025     3:47 PM   Additional Questions   Roomed by Dede OSHEA     History of Present Illness       Reason for visit:  Skin issue on scalp  Symptom onset:  More than a month  Symptoms include:  Large growth on scalp and hair loss in that area  Symptom intensity:  Moderate  Symptom progression:  Staying the same  Had these symptoms before:  Yes  Has tried/received treatment for these symptoms:  No  What makes it worse:  Scratching it seems to make it  "swell and trying to pop it hurts  What makes it better:  Putting on some lotions or creams makes it itch less He is missing 3 dose(s) of medications per week.  He is not taking prescribed medications regularly due to other.            Objective    /82   Pulse (!) 137   Temp 98.5  F (36.9  C) (Temporal)   Resp 24   Ht 1.669 m (5' 5.71\")   Wt 72.1 kg (159 lb)   SpO2 98%   BMI 25.89 kg/m    Body mass index is 25.89 kg/m .  Physical Exam   GENERAL: alert and no distress  SKIN: ~ 1 cm patch of hair loss, painful to palpation and slightly fluctuant. Erythematous appearance.            Signed Electronically by: Jose Magana NP    "

## 2025-04-23 ENCOUNTER — MYC MEDICAL ADVICE (OUTPATIENT)
Dept: FAMILY MEDICINE | Facility: CLINIC | Age: 30
End: 2025-04-23
Payer: COMMERCIAL

## 2025-04-23 DIAGNOSIS — R22.9 LOCALIZED SUPERFICIAL SWELLING, MASS, OR LUMP: Primary | ICD-10-CM

## 2025-04-23 DIAGNOSIS — L72.3 SEBACEOUS CYST: Primary | ICD-10-CM

## 2025-04-28 ENCOUNTER — PATIENT OUTREACH (OUTPATIENT)
Dept: CARE COORDINATION | Facility: CLINIC | Age: 30
End: 2025-04-28
Payer: COMMERCIAL

## 2025-06-03 NOTE — TELEPHONE ENCOUNTER
REFERRAL INFORMATION:  Referring Provider:  Jose Magana NP   Referring Clinic:  [facility]   Reason for Visit/Diagnosis: R22.9 (ICD-10-CM) - Localized superficial swelling, mass, or lump -Scalp lump hoping to get removed- likely a sebaceous cyst        FUTURE VISIT INFORMATION:  Appointment Date: 6/23/25  Appointment Time:      NOTES RECORD STATUS  DETAILS   OFFICE NOTE from Referring Provider Internal    [facility]      Records Requested    Facility    Fax:    Outcome

## 2025-06-23 ENCOUNTER — OFFICE VISIT (OUTPATIENT)
Dept: SURGERY | Facility: CLINIC | Age: 30
End: 2025-06-23
Payer: COMMERCIAL

## 2025-06-23 ENCOUNTER — PRE VISIT (OUTPATIENT)
Dept: SURGERY | Facility: CLINIC | Age: 30
End: 2025-06-23

## 2025-06-23 VITALS
HEART RATE: 96 BPM | WEIGHT: 153.9 LBS | DIASTOLIC BLOOD PRESSURE: 97 MMHG | BODY MASS INDEX: 24.73 KG/M2 | HEIGHT: 66 IN | SYSTOLIC BLOOD PRESSURE: 135 MMHG | OXYGEN SATURATION: 100 %

## 2025-06-23 DIAGNOSIS — R22.9 LOCALIZED SUPERFICIAL SWELLING, MASS, OR LUMP: ICD-10-CM

## 2025-06-23 DIAGNOSIS — L72.3 SEBACEOUS CYST: Primary | ICD-10-CM

## 2025-06-23 ASSESSMENT — PAIN SCALES - GENERAL: PAINLEVEL_OUTOF10: NO PAIN (0)

## 2025-06-23 NOTE — NURSING NOTE
"Chief Complaint   Patient presents with    Consult     Scalp mass.       Vitals:    06/23/25 1327   BP: (!) 135/97   BP Location: Left arm   Patient Position: Sitting   Cuff Size: Adult Regular   Pulse: 96   SpO2: 100%   Weight: 69.8 kg (153 lb 14.4 oz)   Height: 1.67 m (5' 5.75\")       Body mass index is 25.03 kg/m .        Nelson Whitmore EMT    "

## 2025-06-23 NOTE — PROGRESS NOTES
HISTORY     Chief Complaint   Patient presents with    Consult     Scalp mass.     HISTORY OF PRESENT ILLNESS: Denver Brewer is a 29 year old male with a past medical history as noted below, presents for follow up after I&D of infected sebaceous cyst by PCP. Doing well. Tolerating diet and having bowel movements. No nausea or vomiting. No recurrent swelling.  NO drainage. No pain/discomfort.     PAST MEDICAL/SURGICAL HISTORY  Past Medical History:   Diagnosis Date    ADHD (attention deficit hyperactivity disorder)      No past surgical history on file.    MEDICATIONS AND ALLERGIES  Allergies   Allergen Reactions    Cats        Current Outpatient Medications:     amphetamine-dextroamphetamine (ADDERALL) 20 MG tablet, Take 1 tablet (20 mg) by mouth daily., Disp: 30 tablet, Rfl: 0    propranolol (INDERAL) 10 MG tablet, TAKE 1 TABLET(10 MG) BY MOUTH DAILY AS NEEDED FOR SITUATIONAL ANXIETY (Patient not taking: Reported on 6/23/2025), Disp: 90 tablet, Rfl: 0    SOCIAL HISTORY  Social History     Socioeconomic History    Marital status: Single     Spouse name: Not on file    Number of children: Not on file    Years of education: Not on file    Highest education level: Not on file   Occupational History    Not on file   Tobacco Use    Smoking status: Never     Passive exposure: Never    Smokeless tobacco: Never   Vaping Use    Vaping status: Never Used   Substance and Sexual Activity    Alcohol use: Yes     Comment: rare    Drug use: No    Sexual activity: Not Currently   Other Topics Concern    Parent/sibling w/ CABG, MI or angioplasty before 65F 55M? Not Asked   Social History Narrative    Not on file     Social Drivers of Health     Financial Resource Strain: High Risk (1/14/2025)    Financial Resource Strain     Within the past 12 months, have you or your family members you live with been unable to get utilities (heat, electricity) when it was really needed?: Yes   Food Insecurity: Low Risk  (1/14/2025)    Food  Insecurity     Within the past 12 months, did you worry that your food would run out before you got money to buy more?: No     Within the past 12 months, did the food you bought just not last and you didn t have money to get more?: No   Transportation Needs: Low Risk  (1/14/2025)    Transportation Needs     Within the past 12 months, has lack of transportation kept you from medical appointments, getting your medicines, non-medical meetings or appointments, work, or from getting things that you need?: No   Physical Activity: Unknown (1/14/2025)    Exercise Vital Sign     Days of Exercise per Week: 4 days     Minutes of Exercise per Session: Not on file   Stress: No Stress Concern Present (1/14/2025)    [place] [place] [employer] - Occupational Stress Questionnaire     Feeling of Stress : Not at all   Social Connections: Unknown (1/14/2025)    Social Connection and Isolation Panel [NHANES]     Frequency of Communication with Friends and Family: Not on file     Frequency of Social Gatherings with Friends and Family: Once a week     Attends Ep[employer]alian Services: Not on file     Active Member of Clubs or Organizations: Not on file     Attends Club or Organization Meetings: Not on file     Marital Status: Not on file   Interpersonal Safety: Low Risk  (1/14/2025)    Interpersonal Safety     Do you feel physically and emotionally safe where you currently live?: Yes     Within the past 12 months, have you been hit, slapped, kicked or otherwise physically hurt by someone?: No     Within the past 12 months, have you been humiliated or emotionally abused in other ways by your partner or ex-partner?: No   Housing Stability: Low Risk  (1/14/2025)    Housing Stability     Do you have housing? : Yes     Are you worried about losing your housing?: No        FAMILY HISTORY  Family History   Problem Relation Age of Onset    Family History Negative Other        EXAMINATION     Vitals: BP (!) 135/97 (BP Location: Left arm,  "Patient Position: Sitting, Cuff Size: Adult Regular)   Pulse 96   Ht 1.67 m (5' 5.75\")   Wt 69.8 kg (153 lb 14.4 oz)   SpO2 100%   BMI 25.03 kg/m    BMI: Body mass index is 25.03 kg/m .    GENERAL/PSYCH: Patient is awake, A&Ox3, NAD, stable mood, good judgement and insight.  HEAD: Atraumatic, Normocephalic  EYES: Anicteric. Pupils equal and reactive  NECK: No masses/LNs. Trachea midline.  CHEST: Symmetrical, Respiratory effort WNL, no stridor.  HEART: Regular Rate and Rhythm.   ABDOMEN: Soft, non distended with minimal tenderness  INCISION: small, well healed scar, no drainage or mass  LOWER EXTREMITIES: No gross deformity. Pulses palpable and equal bilaterally.  SKIN: No visible generalized rash.       ASSESSMENT & PLAN     Sebaceous cyst  Previously infected with I&D performed by PCP.    No recurrent swelling or drainage    Small scar noted in area of concern with no palpable fluctuance or erythema    No surgical interventions at this time  Pt educated regarding signs of cyst return and surgical options if that were to occur.         Follow up as needed.  Follow up with PCP.  Author: Denzel Abernathy DO 6/23/2025 1:51 PM  Patient's Primary Care Provider: Jacob Ambrose     "

## 2025-06-23 NOTE — PATIENT INSTRUCTIONS
You met with Dr. Denzel Abernathy.      Today's visit instructions:    Return to the Surgery Clinic on an as needed basis.        If you have questions please contact Keke RN or Conchita RN during regular clinic hours, Monday through Friday 7:30 AM - 4:00 PM, or you can contact us via [employer] at anytime.       If you have urgent needs after-hours, weekends, or holidays please call the hospital at [phone] and ask to speak with our on-call General Surgery Team.    Appointment [phone]  Nurse Advice (Keke or Conchita): [phone]   Surgery Scheduler (Radha): [phone]  Billing Customer Service:  [phone]  Fax: [phone]

## 2025-06-23 NOTE — ASSESSMENT & PLAN NOTE
Previously infected with I&D performed by PCP.    No recurrent swelling or drainage    Small scar noted in area of concern with no palpable fluctuance or erythema    No surgical interventions at this time  Pt educated regarding signs of cyst return and surgical options if that were to occur.

## 2025-06-23 NOTE — LETTER
6/23/2025       RE: Denver Brewer  [address]  [place] [facility] [address]     Dear Colleague,    Thank you for referring your patient, Denver Brewre, to the [facility] [place] [facility]. Please see a copy of my visit note below.      HISTORY     Chief Complaint   Patient presents with     Consult     Scalp mass.     HISTORY OF PRESENT ILLNESS: Denver Brewer is a 29 year old male with a past medical history as noted below, presents for follow up after I&D of infected sebaceous cyst by PCP. Doing well. Tolerating diet and having bowel movements. No nausea or vomiting. No recurrent swelling.  NO drainage. No pain/discomfort.     PAST MEDICAL/SURGICAL HISTORY  Past Medical History:   Diagnosis Date     ADHD (attention deficit hyperactivity disorder)      No past surgical history on file.    MEDICATIONS AND ALLERGIES  Allergies   Allergen Reactions     Cats        Current Outpatient Medications:      amphetamine-dextroamphetamine (ADDERALL) 20 MG tablet, Take 1 tablet (20 mg) by mouth daily., Disp: 30 tablet, Rfl: 0     propranolol (INDERAL) 10 MG tablet, TAKE 1 TABLET(10 MG) BY MOUTH DAILY AS NEEDED FOR SITUATIONAL ANXIETY (Patient not taking: Reported on 6/23/2025), Disp: 90 tablet, Rfl: 0    SOCIAL HISTORY  Social History     Socioeconomic History     Marital status: Single     Spouse name: Not on file     Number of children: Not on file     Years of education: Not on file     Highest education level: Not on file   Occupational History     Not on file   Tobacco Use     Smoking status: Never     Passive exposure: Never     Smokeless tobacco: Never   Vaping Use     Vaping status: Never Used   Substance and Sexual Activity     Alcohol use: Yes     Comment: rare     Drug use: No     Sexual activity: Not Currently   Other Topics Concern     Parent/sibling w/ CABG, MI or angioplasty before 65F 55M? Not Asked   Social History Narrative      Not on file     Social Drivers of Health     Financial Resource Strain: High Risk (1/14/2025)    Financial Resource Strain      Within the past 12 months, have you or your family members you live with been unable to get utilities (heat, electricity) when it was really needed?: Yes   Food Insecurity: Low Risk  (1/14/2025)    Food Insecurity      Within the past 12 months, did you worry that your food would run out before you got money to buy more?: No      Within the past 12 months, did the food you bought just not last and you didn t have money to get more?: No   Transportation Needs: Low Risk  (1/14/2025)    Transportation Needs      Within the past 12 months, has lack of transportation kept you from medical appointments, getting your medicines, non-medical meetings or appointments, work, or from getting things that you need?: No   Physical Activity: Unknown (1/14/2025)    Exercise Vital Sign      Days of Exercise per Week: 4 days      Minutes of Exercise per Session: Not on file   Stress: No Stress Concern Present (1/14/2025)    [place] [employer] Occupational Health - Occupational Stress Questionnaire      Feeling of Stress : Not at all   Social Connections: Unknown (1/14/2025)    Social Connection and Isolation Panel [NHANES]      Frequency of Communication with Friends and Family: Not on file      Frequency of Social Gatherings with Friends and Family: Once a week      Attends Christian Services: Not on file      Active Member of Clubs or Organizations: Not on file      Attends Club or Organization Meetings: Not on file      Marital Status: Not on file   Interpersonal Safety: Low Risk  (1/14/2025)    Interpersonal Safety      Do you feel physically and emotionally safe where you currently live?: Yes      Within the past 12 months, have you been hit, slapped, kicked or otherwise physically hurt by someone?: No      Within the past 12 months, have you been humiliated or emotionally abused in other ways by  "your partner or ex-partner?: No   Housing Stability: Low Risk  (1/14/2025)    Housing Stability      Do you have housing? : Yes      Are you worried about losing your housing?: No        FAMILY HISTORY  Family History   Problem Relation Age of Onset     Family History Negative Other        EXAMINATION     Vitals: BP (!) 135/97 (BP Location: Left arm, Patient Position: Sitting, Cuff Size: Adult Regular)   Pulse 96   Ht 1.67 m (5' 5.75\")   Wt 69.8 kg (153 lb 14.4 oz)   SpO2 100%   BMI 25.03 kg/m    BMI: Body mass index is 25.03 kg/m .    GENERAL/PSYCH: Patient is awake, A&Ox3, NAD, stable mood, good judgement and insight.  HEAD: Atraumatic, Normocephalic  EYES: Anicteric. Pupils equal and reactive  NECK: No masses/LNs. Trachea midline.  CHEST: Symmetrical, Respiratory effort WNL, no stridor.  HEART: Regular Rate and Rhythm.   ABDOMEN: Soft, non distended with minimal tenderness  INCISION: small, well healed scar, no drainage or mass  LOWER EXTREMITIES: No gross deformity. Pulses palpable and equal bilaterally.  SKIN: No visible generalized rash.       ASSESSMENT & PLAN     Sebaceous cyst  Previously infected with I&D performed by PCP.    No recurrent swelling or drainage    Small scar noted in area of concern with no palpable fluctuance or erythema    No surgical interventions at this time  Pt educated regarding signs of cyst return and surgical options if that were to occur.         Follow up as needed.  Follow up with PCP.  Author: Denzel Abernathy DO 6/23/2025 1:51 PM  Patient's Primary Care Provider: Jacob Ambrose       Again, thank you for allowing me to participate in the care of your patient.      Sincerely,    Denzel Abernathy, DO    "

## 2025-06-25 ENCOUNTER — TRANSFERRED RECORDS (OUTPATIENT)
Dept: HEALTH INFORMATION MANAGEMENT | Facility: CLINIC | Age: 30
End: 2025-06-25
Payer: COMMERCIAL

## 2025-07-25 ENCOUNTER — MYC REFILL (OUTPATIENT)
Dept: FAMILY MEDICINE | Facility: CLINIC | Age: 30
End: 2025-07-25
Payer: COMMERCIAL

## 2025-07-25 DIAGNOSIS — F90.9 ATTENTION DEFICIT HYPERACTIVITY DISORDER (ADHD), UNSPECIFIED ADHD TYPE: ICD-10-CM

## 2025-07-28 RX ORDER — DEXTROAMPHETAMINE SACCHARATE, AMPHETAMINE ASPARTATE, DEXTROAMPHETAMINE SULFATE AND AMPHETAMINE SULFATE 5; 5; 5; 5 MG/1; MG/1; MG/1; MG/1
20 TABLET ORAL DAILY
Qty: 30 TABLET | Refills: 0 | Status: SHIPPED | OUTPATIENT
Start: 2025-07-28

## 2025-07-28 NOTE — TELEPHONE ENCOUNTER
RF ordered. Patient is due for VV for ADD management.    Jacob Ambrose MD  [facility], [place]

## 2025-08-04 ENCOUNTER — VIRTUAL VISIT (OUTPATIENT)
Dept: FAMILY MEDICINE | Facility: CLINIC | Age: 30
End: 2025-08-04
Payer: COMMERCIAL

## 2025-08-04 DIAGNOSIS — G44.86 CERVICOGENIC HEADACHE: Primary | ICD-10-CM

## 2025-08-04 DIAGNOSIS — F90.9 ATTENTION DEFICIT HYPERACTIVITY DISORDER (ADHD), UNSPECIFIED ADHD TYPE: ICD-10-CM

## 2025-08-04 DIAGNOSIS — I10 HYPERTENSION, UNSPECIFIED TYPE: ICD-10-CM

## 2025-08-04 DIAGNOSIS — F41.0 ANXIETY ATTACK: ICD-10-CM

## 2025-08-04 PROCEDURE — 98005 SYNCH AUDIO-VIDEO EST LOW 20: CPT | Performed by: FAMILY MEDICINE

## 2025-08-18 ENCOUNTER — OFFICE VISIT (OUTPATIENT)
Dept: FAMILY MEDICINE | Facility: CLINIC | Age: 30
End: 2025-08-18
Payer: COMMERCIAL

## 2025-08-18 VITALS
HEIGHT: 65 IN | BODY MASS INDEX: 25.33 KG/M2 | OXYGEN SATURATION: 97 % | WEIGHT: 152 LBS | HEART RATE: 82 BPM | RESPIRATION RATE: 19 BRPM | DIASTOLIC BLOOD PRESSURE: 79 MMHG | SYSTOLIC BLOOD PRESSURE: 135 MMHG | TEMPERATURE: 98.7 F

## 2025-08-18 DIAGNOSIS — I10 HYPERTENSION GOAL BP (BLOOD PRESSURE) < 140/90: ICD-10-CM

## 2025-08-18 DIAGNOSIS — S16.1XXS STRAIN OF NECK MUSCLE, SEQUELA: ICD-10-CM

## 2025-08-18 DIAGNOSIS — F90.9 ATTENTION DEFICIT HYPERACTIVITY DISORDER (ADHD), UNSPECIFIED ADHD TYPE: Primary | ICD-10-CM

## 2025-08-18 DIAGNOSIS — F40.10 SOCIAL ANXIETY DISORDER: ICD-10-CM

## 2025-08-18 PROCEDURE — 3078F DIAST BP <80 MM HG: CPT | Performed by: FAMILY MEDICINE

## 2025-08-18 PROCEDURE — 3075F SYST BP GE 130 - 139MM HG: CPT | Performed by: FAMILY MEDICINE

## 2025-08-18 PROCEDURE — 1126F AMNT PAIN NOTED NONE PRSNT: CPT | Performed by: FAMILY MEDICINE

## 2025-08-18 PROCEDURE — 99215 OFFICE O/P EST HI 40 MIN: CPT | Performed by: FAMILY MEDICINE

## 2025-08-18 RX ORDER — DEXTROAMPHETAMINE SACCHARATE, AMPHETAMINE ASPARTATE, DEXTROAMPHETAMINE SULFATE AND AMPHETAMINE SULFATE 5; 5; 5; 5 MG/1; MG/1; MG/1; MG/1
20 TABLET ORAL DAILY
Qty: 30 TABLET | Refills: 0 | Status: SHIPPED | OUTPATIENT
Start: 2025-10-17 | End: 2025-11-16

## 2025-08-18 RX ORDER — ESCITALOPRAM OXALATE 5 MG/1
5 TABLET ORAL DAILY
Qty: 30 TABLET | Refills: 4 | Status: SHIPPED | OUTPATIENT
Start: 2025-08-18

## 2025-08-18 RX ORDER — DEXTROAMPHETAMINE SACCHARATE, AMPHETAMINE ASPARTATE, DEXTROAMPHETAMINE SULFATE AND AMPHETAMINE SULFATE 5; 5; 5; 5 MG/1; MG/1; MG/1; MG/1
20 TABLET ORAL DAILY
Qty: 30 TABLET | Refills: 0 | Status: SHIPPED | OUTPATIENT
Start: 2025-08-18 | End: 2025-09-17

## 2025-08-18 RX ORDER — DEXTROAMPHETAMINE SACCHARATE, AMPHETAMINE ASPARTATE, DEXTROAMPHETAMINE SULFATE AND AMPHETAMINE SULFATE 5; 5; 5; 5 MG/1; MG/1; MG/1; MG/1
20 TABLET ORAL DAILY
Qty: 30 TABLET | Refills: 0 | Status: SHIPPED | OUTPATIENT
Start: 2025-09-17 | End: 2025-10-17

## 2025-08-18 ASSESSMENT — ANXIETY QUESTIONNAIRES
4. TROUBLE RELAXING: SEVERAL DAYS
1. FEELING NERVOUS, ANXIOUS, OR ON EDGE: NEARLY EVERY DAY
7. FEELING AFRAID AS IF SOMETHING AWFUL MIGHT HAPPEN: NEARLY EVERY DAY
7. FEELING AFRAID AS IF SOMETHING AWFUL MIGHT HAPPEN: NEARLY EVERY DAY
2. NOT BEING ABLE TO STOP OR CONTROL WORRYING: NEARLY EVERY DAY
GAD7 TOTAL SCORE: 15
6. BECOMING EASILY ANNOYED OR IRRITABLE: NEARLY EVERY DAY
3. WORRYING TOO MUCH ABOUT DIFFERENT THINGS: MORE THAN HALF THE DAYS
8. IF YOU CHECKED OFF ANY PROBLEMS, HOW DIFFICULT HAVE THESE MADE IT FOR YOU TO DO YOUR WORK, TAKE CARE OF THINGS AT HOME, OR GET ALONG WITH OTHER PEOPLE?: EXTREMELY DIFFICULT
GAD7 TOTAL SCORE: 15
IF YOU CHECKED OFF ANY PROBLEMS ON THIS QUESTIONNAIRE, HOW DIFFICULT HAVE THESE PROBLEMS MADE IT FOR YOU TO DO YOUR WORK, TAKE CARE OF THINGS AT HOME, OR GET ALONG WITH OTHER PEOPLE: EXTREMELY DIFFICULT
GAD7 TOTAL SCORE: 15
5. BEING SO RESTLESS THAT IT IS HARD TO SIT STILL: NOT AT ALL

## 2025-08-18 ASSESSMENT — PAIN SCALES - GENERAL: PAINLEVEL_OUTOF10: NO PAIN (0)

## 2025-08-18 ASSESSMENT — PATIENT HEALTH QUESTIONNAIRE - PHQ9: SUM OF ALL RESPONSES TO PHQ QUESTIONS 1-9: 4
